# Patient Record
Sex: MALE | Race: WHITE | Employment: UNEMPLOYED | URBAN - METROPOLITAN AREA
[De-identification: names, ages, dates, MRNs, and addresses within clinical notes are randomized per-mention and may not be internally consistent; named-entity substitution may affect disease eponyms.]

---

## 2017-02-20 ENCOUNTER — TELEPHONE (OUTPATIENT)
Dept: FAMILY MEDICINE CLINIC | Age: 42
End: 2017-02-20

## 2017-02-22 ENCOUNTER — OFFICE VISIT (OUTPATIENT)
Dept: FAMILY MEDICINE CLINIC | Age: 42
End: 2017-02-22

## 2017-02-22 VITALS
TEMPERATURE: 98.3 F | HEIGHT: 75 IN | BODY MASS INDEX: 29.72 KG/M2 | SYSTOLIC BLOOD PRESSURE: 138 MMHG | HEART RATE: 104 BPM | DIASTOLIC BLOOD PRESSURE: 70 MMHG | RESPIRATION RATE: 19 BRPM | OXYGEN SATURATION: 97 % | WEIGHT: 239 LBS

## 2017-02-22 DIAGNOSIS — N52.8 OTHER MALE ERECTILE DYSFUNCTION: ICD-10-CM

## 2017-02-22 DIAGNOSIS — M54.40 CHRONIC LEFT-SIDED LOW BACK PAIN WITH SCIATICA, SCIATICA LATERALITY UNSPECIFIED: Primary | ICD-10-CM

## 2017-02-22 DIAGNOSIS — F31.31 BIPOLAR AFFECTIVE DISORDER, CURRENTLY DEPRESSED, MILD (HCC): ICD-10-CM

## 2017-02-22 DIAGNOSIS — G89.29 CHRONIC LEFT-SIDED LOW BACK PAIN WITH SCIATICA, SCIATICA LATERALITY UNSPECIFIED: Primary | ICD-10-CM

## 2017-02-22 PROCEDURE — 99213 OFFICE O/P EST LOW 20 MIN: CPT | Performed by: FAMILY MEDICINE

## 2017-02-22 ASSESSMENT — ENCOUNTER SYMPTOMS: BACK PAIN: 1

## 2017-03-19 ENCOUNTER — HOSPITAL ENCOUNTER (EMERGENCY)
Age: 42
Discharge: HOME | End: 2017-03-19
Attending: FAMILY MEDICINE
Payer: COMMERCIAL

## 2017-03-19 VITALS
WEIGHT: 235 LBS | RESPIRATION RATE: 16 BRPM | BODY MASS INDEX: 29.22 KG/M2 | OXYGEN SATURATION: 98 % | SYSTOLIC BLOOD PRESSURE: 152 MMHG | TEMPERATURE: 98.2 F | HEIGHT: 75 IN | HEART RATE: 82 BPM | DIASTOLIC BLOOD PRESSURE: 98 MMHG

## 2017-03-19 DIAGNOSIS — J01.01 ACUTE RECURRENT MAXILLARY SINUSITIS: Primary | ICD-10-CM

## 2017-03-19 PROCEDURE — 99283 EMERGENCY DEPT VISIT LOW MDM: CPT

## 2017-03-19 RX ORDER — AMOXICILLIN AND CLAVULANATE POTASSIUM 875; 125 MG/1; MG/1
1 TABLET, FILM COATED ORAL 2 TIMES DAILY
Qty: 20 TABLET | Refills: 0 | Status: SHIPPED | OUTPATIENT
Start: 2017-03-19 | End: 2017-03-29

## 2017-03-19 ASSESSMENT — PAIN SCALES - GENERAL
PAINLEVEL_OUTOF10: 7
PAINLEVEL_OUTOF10: 7

## 2017-03-19 ASSESSMENT — PAIN DESCRIPTION - DESCRIPTORS: DESCRIPTORS: ACHING

## 2017-03-19 ASSESSMENT — PAIN DESCRIPTION - PAIN TYPE
TYPE: ACUTE PAIN
TYPE: CHRONIC PAIN

## 2017-03-19 ASSESSMENT — PAIN DESCRIPTION - LOCATION: LOCATION: BACK

## 2017-03-20 ENCOUNTER — OFFICE VISIT (OUTPATIENT)
Dept: FAMILY MEDICINE CLINIC | Age: 42
End: 2017-03-20

## 2017-03-20 VITALS
BODY MASS INDEX: 29.12 KG/M2 | OXYGEN SATURATION: 98 % | WEIGHT: 234.2 LBS | RESPIRATION RATE: 20 BRPM | HEART RATE: 103 BPM | DIASTOLIC BLOOD PRESSURE: 72 MMHG | TEMPERATURE: 98.7 F | HEIGHT: 75 IN | SYSTOLIC BLOOD PRESSURE: 124 MMHG

## 2017-03-20 DIAGNOSIS — M54.40 CHRONIC LEFT-SIDED LOW BACK PAIN WITH SCIATICA, SCIATICA LATERALITY UNSPECIFIED: ICD-10-CM

## 2017-03-20 DIAGNOSIS — G89.29 CHRONIC LEFT-SIDED LOW BACK PAIN WITH SCIATICA, SCIATICA LATERALITY UNSPECIFIED: ICD-10-CM

## 2017-03-20 DIAGNOSIS — F31.31 BIPOLAR AFFECTIVE DISORDER, CURRENTLY DEPRESSED, MILD (HCC): Primary | ICD-10-CM

## 2017-03-20 PROCEDURE — 99213 OFFICE O/P EST LOW 20 MIN: CPT | Performed by: FAMILY MEDICINE

## 2017-03-20 RX ORDER — FLUTICASONE PROPIONATE 50 MCG
2 SPRAY, SUSPENSION (ML) NASAL DAILY
COMMUNITY
Start: 2017-03-11 | End: 2019-02-11 | Stop reason: SDUPTHER

## 2017-03-20 RX ORDER — AZELASTINE 1 MG/ML
SPRAY, METERED NASAL
COMMUNITY
Start: 2017-03-11 | End: 2017-09-25 | Stop reason: SDUPTHER

## 2017-03-20 ASSESSMENT — ENCOUNTER SYMPTOMS: BACK PAIN: 1

## 2017-03-22 ENCOUNTER — OFFICE VISIT (OUTPATIENT)
Dept: UROLOGY | Age: 42
End: 2017-03-22

## 2017-03-22 VITALS
HEIGHT: 75 IN | HEART RATE: 102 BPM | BODY MASS INDEX: 29.22 KG/M2 | DIASTOLIC BLOOD PRESSURE: 68 MMHG | SYSTOLIC BLOOD PRESSURE: 138 MMHG | WEIGHT: 235 LBS

## 2017-03-22 DIAGNOSIS — N52.9 ERECTILE DYSFUNCTION, UNSPECIFIED ERECTILE DYSFUNCTION TYPE: Primary | ICD-10-CM

## 2017-03-22 PROCEDURE — 99204 OFFICE O/P NEW MOD 45 MIN: CPT | Performed by: UROLOGY

## 2017-03-22 RX ORDER — TADALAFIL 20 MG/1
20 TABLET ORAL PRN
Qty: 3 TABLET | Refills: 0 | COMMUNITY
Start: 2017-03-22 | End: 2017-09-25

## 2017-03-22 ASSESSMENT — ENCOUNTER SYMPTOMS
RESPIRATORY NEGATIVE: 1
ALLERGIC/IMMUNOLOGIC NEGATIVE: 1
EYES NEGATIVE: 1
BACK PAIN: 1
GASTROINTESTINAL NEGATIVE: 1

## 2017-09-25 ENCOUNTER — OFFICE VISIT (OUTPATIENT)
Dept: FAMILY MEDICINE CLINIC | Age: 42
End: 2017-09-25

## 2017-09-25 ENCOUNTER — TELEPHONE (OUTPATIENT)
Dept: FAMILY MEDICINE CLINIC | Age: 42
End: 2017-09-25

## 2017-09-25 VITALS
OXYGEN SATURATION: 98 % | SYSTOLIC BLOOD PRESSURE: 130 MMHG | HEIGHT: 75 IN | BODY MASS INDEX: 30.09 KG/M2 | DIASTOLIC BLOOD PRESSURE: 80 MMHG | RESPIRATION RATE: 14 BRPM | TEMPERATURE: 96.3 F | HEART RATE: 89 BPM | WEIGHT: 242 LBS

## 2017-09-25 DIAGNOSIS — J40 BRONCHITIS: Primary | ICD-10-CM

## 2017-09-25 DIAGNOSIS — F19.11 SUBSTANCE ABUSE IN REMISSION (HCC): ICD-10-CM

## 2017-09-25 DIAGNOSIS — N52.9 ERECTILE DYSFUNCTION, UNSPECIFIED ERECTILE DYSFUNCTION TYPE: ICD-10-CM

## 2017-09-25 LAB
ALBUMIN SERPL-MCNC: 5 G/DL (ref 3.9–4.9)
ALP BLD-CCNC: 81 U/L (ref 35–104)
ALT SERPL-CCNC: 46 U/L (ref 0–41)
ANION GAP SERPL CALCULATED.3IONS-SCNC: 21 MEQ/L (ref 7–13)
AST SERPL-CCNC: 30 U/L (ref 0–40)
BILIRUB SERPL-MCNC: 0.3 MG/DL (ref 0–1.2)
BUN BLDV-MCNC: 10 MG/DL (ref 6–20)
CALCIUM SERPL-MCNC: 10.1 MG/DL (ref 8.6–10.2)
CHLORIDE BLD-SCNC: 99 MEQ/L (ref 98–107)
CO2: 25 MEQ/L (ref 22–29)
CREAT SERPL-MCNC: 0.83 MG/DL (ref 0.7–1.2)
GFR AFRICAN AMERICAN: >60
GFR NON-AFRICAN AMERICAN: >60
GLOBULIN: 2.4 G/DL (ref 2.3–3.5)
GLUCOSE BLD-MCNC: 108 MG/DL (ref 74–109)
HCT VFR BLD CALC: 46.5 % (ref 42–52)
HEMOGLOBIN: 15.4 G/DL (ref 14–18)
HEPATITIS C ANTIBODY INTERPRETATION: NORMAL
MCH RBC QN AUTO: 30.4 PG (ref 27–31.3)
MCHC RBC AUTO-ENTMCNC: 33 % (ref 33–37)
MCV RBC AUTO: 92 FL (ref 80–100)
PDW BLD-RTO: 13.1 % (ref 11.5–14.5)
PLATELET # BLD: 233 K/UL (ref 130–400)
POTASSIUM SERPL-SCNC: 4.4 MEQ/L (ref 3.5–5.1)
RBC # BLD: 5.06 M/UL (ref 4.7–6.1)
SODIUM BLD-SCNC: 145 MEQ/L (ref 132–144)
TOTAL PROTEIN: 7.4 G/DL (ref 6.4–8.1)
WBC # BLD: 15.5 K/UL (ref 4.8–10.8)

## 2017-09-25 PROCEDURE — 99213 OFFICE O/P EST LOW 20 MIN: CPT | Performed by: NURSE PRACTITIONER

## 2017-09-25 RX ORDER — TADALAFIL 5 MG/1
5 TABLET ORAL PRN
Qty: 30 TABLET | Refills: 3 | Status: SHIPPED | OUTPATIENT
Start: 2017-09-25 | End: 2017-10-20

## 2017-09-25 RX ORDER — METHYLPREDNISOLONE 4 MG/1
TABLET ORAL
Qty: 1 KIT | Refills: 0 | Status: SHIPPED | OUTPATIENT
Start: 2017-09-25 | End: 2017-10-01

## 2017-09-25 RX ORDER — AZITHROMYCIN 250 MG/1
TABLET, FILM COATED ORAL
Qty: 1 PACKET | Refills: 0 | Status: SHIPPED | OUTPATIENT
Start: 2017-09-25 | End: 2017-10-05

## 2017-09-25 RX ORDER — TADALAFIL 20 MG/1
10 TABLET ORAL PRN
Qty: 3 TABLET | Refills: 0 | Status: CANCELLED | OUTPATIENT
Start: 2017-09-25

## 2017-09-25 RX ORDER — AZELASTINE 1 MG/ML
2 SPRAY, METERED NASAL 2 TIMES DAILY
Qty: 1 BOTTLE | Refills: 5 | Status: SHIPPED | OUTPATIENT
Start: 2017-09-25 | End: 2018-09-26 | Stop reason: SDUPTHER

## 2017-09-25 ASSESSMENT — PATIENT HEALTH QUESTIONNAIRE - PHQ9
1. LITTLE INTEREST OR PLEASURE IN DOING THINGS: 0
SUM OF ALL RESPONSES TO PHQ9 QUESTIONS 1 & 2: 0
SUM OF ALL RESPONSES TO PHQ QUESTIONS 1-9: 0
2. FEELING DOWN, DEPRESSED OR HOPELESS: 0

## 2017-09-25 ASSESSMENT — ENCOUNTER SYMPTOMS: COUGH: 1

## 2017-09-27 LAB — HIV-1 AND HIV-2 ANTIBODIES: NEGATIVE

## 2017-10-16 ENCOUNTER — OFFICE VISIT (OUTPATIENT)
Dept: FAMILY MEDICINE CLINIC | Age: 42
End: 2017-10-16

## 2017-10-16 VITALS
DIASTOLIC BLOOD PRESSURE: 84 MMHG | WEIGHT: 242 LBS | OXYGEN SATURATION: 98 % | RESPIRATION RATE: 16 BRPM | HEIGHT: 75 IN | TEMPERATURE: 96.5 F | BODY MASS INDEX: 30.09 KG/M2 | SYSTOLIC BLOOD PRESSURE: 132 MMHG | HEART RATE: 88 BPM

## 2017-10-16 DIAGNOSIS — R19.5 LOOSE STOOLS: ICD-10-CM

## 2017-10-16 DIAGNOSIS — R10.30 LOWER ABDOMINAL PAIN: Primary | ICD-10-CM

## 2017-10-16 PROCEDURE — 99213 OFFICE O/P EST LOW 20 MIN: CPT | Performed by: NURSE PRACTITIONER

## 2017-10-16 RX ORDER — METRONIDAZOLE 500 MG/1
500 TABLET ORAL 3 TIMES DAILY
Qty: 30 TABLET | Refills: 0 | Status: SHIPPED | OUTPATIENT
Start: 2017-10-16 | End: 2017-10-26

## 2017-10-16 ASSESSMENT — ENCOUNTER SYMPTOMS
FLATUS: 0
ABDOMINAL DISTENTION: 0
BLOATING: 1
CONSTIPATION: 0
DIARRHEA: 1
RESPIRATORY NEGATIVE: 1
VOMITING: 0
ABDOMINAL PAIN: 1
RECTAL PAIN: 0
BLOOD IN STOOL: 0
COUGH: 0
NAUSEA: 0
ANAL BLEEDING: 0

## 2017-10-16 NOTE — PROGRESS NOTES
Packs/day: 1.00     Types: Cigarettes    Smokeless tobacco: Never Used    Alcohol use No    Drug use: No    Sexual activity: Yes     Other Topics Concern    Not on file     Social History Narrative    No narrative on file     No family history on file. No Known Allergies  Current Outpatient Prescriptions   Medication Sig Dispense Refill    metroNIDAZOLE (FLAGYL) 500 MG tablet Take 1 tablet by mouth 3 times daily for 10 days 30 tablet 0    azelastine (ASTELIN) 0.1 % nasal spray 2 sprays by Nasal route 2 times daily 1 Bottle 5    tadalafil (CIALIS) 5 MG tablet Take 1 tablet by mouth as needed for Erectile Dysfunction 30 tablet 3    fluticasone (FLONASE) 50 MCG/ACT nasal spray        No current facility-administered medications for this visit. PMH, Surgical Hx, Family Hx, and Social Hx reviewed and updated. Health Maintenance reviewed. Objective    Vitals:    10/16/17 0932 10/16/17 0936   BP: (!) 144/94 132/84   Pulse: 88    Resp: 16    Temp: 96.5 °F (35.8 °C)    TempSrc: Tympanic    SpO2: 98%    Weight: 242 lb (109.8 kg)    Height: 6' 3\" (1.905 m)        Physical Exam   Constitutional: He is oriented to person, place, and time. He appears well-developed and well-nourished. No distress. HENT:   Head: Normocephalic and atraumatic. Right Ear: Tympanic membrane and external ear normal.   Left Ear: Tympanic membrane and external ear normal.   Nose: Nose normal.   Mouth/Throat: Uvula is midline, oropharynx is clear and moist and mucous membranes are normal. Mucous membranes are not dry. Eyes: Conjunctivae are normal. Pupils are equal, round, and reactive to light. Neck: Neck supple. No JVD present. Cardiovascular: Normal rate, regular rhythm, normal heart sounds and intact distal pulses. No murmur heard. Pulmonary/Chest: Effort normal and breath sounds normal. No respiratory distress. He has no wheezes. He has no rales. Abdominal: Soft.  Bowel sounds are normal. He exhibits no distension and no mass. There is tenderness. There is guarding. There is no rebound. Neurological: He is alert and oriented to person, place, and time. Skin: Skin is warm and dry. Assessment & Plan   Key Madden was seen today for follow-up. Diagnoses and all orders for this visit:    Lower abdominal pain  -     metroNIDAZOLE (FLAGYL) 500 MG tablet; Take 1 tablet by mouth 3 times daily for 10 days  -     Culture Stool; Future  -     C Diff Toxin B By Rt PCR; Future    Loose stools  -     metroNIDAZOLE (FLAGYL) 500 MG tablet; Take 1 tablet by mouth 3 times daily for 10 days  -     Culture Stool; Future  -     C Diff Toxin B By Rt PCR; Future      Orders Placed This Encounter   Procedures    Culture Stool     Standing Status:   Future     Standing Expiration Date:   10/16/2018    C Diff Toxin B By Rt PCR     Standing Status:   Future     Standing Expiration Date:   10/16/2018     Orders Placed This Encounter   Medications    metroNIDAZOLE (FLAGYL) 500 MG tablet     Sig: Take 1 tablet by mouth 3 times daily for 10 days     Dispense:  30 tablet     Refill:  0     There are no discontinued medications. Return in about 4 weeks (around 11/13/2017). Reviewed with the patient: current clinical status, medications, activities and diet. Side effects, adverse effects of the medication prescribed today, as well as treatment plan/ rationale and result expectations have been discussed with the patient who expresses understanding and desires to proceed. Close follow up to evaluate treatment results and for coordination of care. I have reviewed the patient's medical history in detail and updated the computerized patient record.     Cary Chiang NP

## 2017-10-20 ENCOUNTER — TELEPHONE (OUTPATIENT)
Dept: FAMILY MEDICINE CLINIC | Age: 42
End: 2017-10-20

## 2017-10-20 ENCOUNTER — APPOINTMENT (OUTPATIENT)
Dept: GENERAL RADIOLOGY | Age: 42
End: 2017-10-20
Payer: COMMERCIAL

## 2017-10-20 ENCOUNTER — HOSPITAL ENCOUNTER (EMERGENCY)
Age: 42
Discharge: HOME | End: 2017-10-21
Payer: COMMERCIAL

## 2017-10-20 DIAGNOSIS — R10.84 GENERALIZED ABDOMINAL PAIN: Primary | ICD-10-CM

## 2017-10-20 DIAGNOSIS — J30.2 SEASONAL ALLERGIC RHINITIS, UNSPECIFIED CHRONICITY, UNSPECIFIED TRIGGER: ICD-10-CM

## 2017-10-20 LAB
ALBUMIN SERPL-MCNC: 4.2 G/DL (ref 3.9–4.9)
ALP BLD-CCNC: 78 U/L (ref 35–104)
ALT SERPL-CCNC: 76 U/L (ref 0–41)
ANION GAP SERPL CALCULATED.3IONS-SCNC: 14 MEQ/L (ref 7–13)
AST SERPL-CCNC: 56 U/L (ref 0–40)
BILIRUB SERPL-MCNC: 0.2 MG/DL (ref 0–1.2)
BUN BLDV-MCNC: 11 MG/DL (ref 6–20)
CALCIUM SERPL-MCNC: 9.9 MG/DL (ref 8.6–10.2)
CHLORIDE BLD-SCNC: 104 MEQ/L (ref 98–107)
CO2: 25 MEQ/L (ref 22–29)
CREAT SERPL-MCNC: 0.94 MG/DL (ref 0.7–1.2)
EKG ATRIAL RATE: 84 BPM
EKG P AXIS: 29 DEGREES
EKG P-R INTERVAL: 150 MS
EKG Q-T INTERVAL: 356 MS
EKG QRS DURATION: 104 MS
EKG QTC CALCULATION (BAZETT): 420 MS
EKG R AXIS: 54 DEGREES
EKG T AXIS: 22 DEGREES
EKG VENTRICULAR RATE: 84 BPM
GFR AFRICAN AMERICAN: >60
GFR NON-AFRICAN AMERICAN: >60
GLOBULIN: 2.2 G/DL (ref 2.3–3.5)
GLUCOSE BLD-MCNC: 122 MG/DL (ref 74–109)
HCT VFR BLD CALC: 41.8 % (ref 42–52)
HEMOGLOBIN: 14.3 G/DL (ref 14–18)
MCH RBC QN AUTO: 30.8 PG (ref 27–31.3)
MCHC RBC AUTO-ENTMCNC: 34.3 % (ref 33–37)
MCV RBC AUTO: 89.8 FL (ref 80–100)
PDW BLD-RTO: 13.3 % (ref 11.5–14.5)
PLATELET # BLD: 169 K/UL (ref 130–400)
POTASSIUM SERPL-SCNC: 3.9 MEQ/L (ref 3.5–5.1)
RAPID INFLUENZA  B AGN: NEGATIVE
RAPID INFLUENZA A AGN: NEGATIVE
RBC # BLD: 4.66 M/UL (ref 4.7–6.1)
SODIUM BLD-SCNC: 143 MEQ/L (ref 132–144)
TOTAL PROTEIN: 6.4 G/DL (ref 6.4–8.1)
TROPONIN: <0.01 NG/ML (ref 0–0.01)
WBC # BLD: 4.7 K/UL (ref 4.8–10.8)

## 2017-10-20 PROCEDURE — 84484 ASSAY OF TROPONIN QUANT: CPT

## 2017-10-20 PROCEDURE — 71020 XR CHEST STANDARD TWO VW: CPT

## 2017-10-20 PROCEDURE — 93005 ELECTROCARDIOGRAM TRACING: CPT

## 2017-10-20 PROCEDURE — 86403 PARTICLE AGGLUT ANTBDY SCRN: CPT

## 2017-10-20 PROCEDURE — 85027 COMPLETE CBC AUTOMATED: CPT

## 2017-10-20 PROCEDURE — 99284 EMERGENCY DEPT VISIT MOD MDM: CPT

## 2017-10-20 PROCEDURE — 36415 COLL VENOUS BLD VENIPUNCTURE: CPT

## 2017-10-20 PROCEDURE — 80053 COMPREHEN METABOLIC PANEL: CPT

## 2017-10-20 RX ORDER — BENZONATATE 100 MG/1
100 CAPSULE ORAL ONCE
Status: COMPLETED | OUTPATIENT
Start: 2017-10-20 | End: 2017-10-21

## 2017-10-20 RX ORDER — LORATADINE 10 MG/1
10 CAPSULE, LIQUID FILLED ORAL DAILY
Qty: 30 CAPSULE | Refills: 0 | Status: SHIPPED | OUTPATIENT
Start: 2017-10-20 | End: 2017-11-21 | Stop reason: SDUPTHER

## 2017-10-20 ASSESSMENT — PAIN DESCRIPTION - FREQUENCY: FREQUENCY: CONTINUOUS

## 2017-10-20 ASSESSMENT — PAIN SCALES - GENERAL: PAINLEVEL_OUTOF10: 8

## 2017-10-20 ASSESSMENT — PAIN DESCRIPTION - LOCATION: LOCATION: ABDOMEN;FACE

## 2017-10-20 ASSESSMENT — PAIN DESCRIPTION - DESCRIPTORS: DESCRIPTORS: ACHING

## 2017-10-21 VITALS
SYSTOLIC BLOOD PRESSURE: 124 MMHG | WEIGHT: 240 LBS | DIASTOLIC BLOOD PRESSURE: 68 MMHG | TEMPERATURE: 98.6 F | HEIGHT: 75 IN | BODY MASS INDEX: 29.84 KG/M2 | HEART RATE: 86 BPM | RESPIRATION RATE: 17 BRPM | OXYGEN SATURATION: 99 %

## 2017-10-21 PROCEDURE — 6370000000 HC RX 637 (ALT 250 FOR IP): Performed by: NURSE PRACTITIONER

## 2017-10-21 RX ORDER — OMEPRAZOLE 20 MG/1
20 CAPSULE, DELAYED RELEASE ORAL DAILY
Qty: 30 CAPSULE | Refills: 0 | Status: SHIPPED | OUTPATIENT
Start: 2017-10-21 | End: 2018-02-07 | Stop reason: ALTCHOICE

## 2017-10-21 RX ADMIN — BENZONATATE 100 MG: 100 CAPSULE ORAL at 00:04

## 2017-10-21 NOTE — ED PROVIDER NOTES
[facility] ED  eMERGENCY dEPARTMENT eNCOUnter      Pt Name: Baylee Flores  MRN: [id number]  [place] 1975  Date of evaluation: 10/20/2017  Provider: Todd Newby NP     [address] [place] [address]       Chief Complaint   Patient presents with    Abdominal Pain     x a few weeks, diarrhea, chest burning and facial congestion       HISTORY OF PRESENT ILLNESS   (Location/Symptom, Timing/Onset, Context/Setting, Quality, Duration, Modifying Factors, Severity) Note limiting factors. This is a 39 yo male patient who presents to the ER with complaints of abdominal pain and diarrhea for a couple of weeks. Pt also complains of upper respiratory symptoms with non-productive cough, nasal congestion and runny nose. Pt admits to smoking one pack of cigarettes per day. Pt states that his wife was recently admitted to the hospital for pneumonia and bronchitis and he spent a lot of time at her bedside. Pt then was at the bedside with his mother who was also hospitalized for c-diff. Pt was seen by Katrin Walsh who prescribed him flaggyl prophylactically for c-diff and he has been taking the prescription for four days. Pt states that he feels like he has icy hot in his chest as he has a cold sensation from coughing and congestion. Pt denies any shortness of breath or exertional dyspnea. Pt denies any fever, chills, nausea, vomiting, but states that he is still having diarrhea. Pt also denies any recent travel and has been in contact with sick contacts. Pt denies any headache, blurred or double vision.           Abdominal Pain   Pain location:  Generalized  Pain quality: aching and cramping    Pain radiates to:  Does not radiate  Pain severity:  Moderate  Onset quality:  Gradual  Duration:  14 days  Timing:  Intermittent  Progression:  Unchanged  Chronicity:  New  Context: sick contacts    Context: not alcohol use, not awakening from sleep, not diet changes, not eating, not laxative use, not medication withdrawal, not previous surgeries, not recent illness, not recent sexual activity, not recent travel, not retching, not suspicious food intake and not trauma    Relieved by:  None tried  Worsened by:  Nothing  Ineffective treatments:  None tried (Over-the-counter medications have been tried for his cold symptoms however he has been taking Flagyl for 4 days.)  Associated symptoms: cough and diarrhea    Associated symptoms: no anorexia, no belching, no chest pain, no chills, no constipation, no dysuria, no fatigue, no fever, no flatus, no hematuria, no melena, no nausea, no shortness of breath, no sore throat and no vomiting    Risk factors: obesity    Risk factors: no alcohol abuse and no recent hospitalization        Nursing Notes were reviewed. REVIEW OF SYSTEMS    (2+ for level 4; 10+ for level 5)     Review of Systems   Constitutional: Negative for chills, fatigue and fever. HENT: Positive for congestion, rhinorrhea, sinus pain and sinus pressure. Negative for ear discharge, ear pain, facial swelling, sneezing, sore throat and trouble swallowing. Respiratory: Positive for cough. Negative for shortness of breath. Cardiovascular: Negative for chest pain. Gastrointestinal: Positive for abdominal pain and diarrhea. Negative for anorexia, constipation, flatus, melena, nausea and vomiting. Genitourinary: Negative for dysuria and hematuria. Musculoskeletal: Negative for arthralgias and back pain. Skin: Negative for color change. Neurological: Negative for dizziness, seizures, weakness, light-headedness, numbness and headaches. Psychiatric/Behavioral: Negative for agitation and behavioral problems. Except as noted above the remainder of the review of systems was reviewed and negative.      PAST MEDICAL HISTORY     Past Medical History:   Diagnosis Date    Anxiety     Chronic back pain     Drug abuse     pt states he has been clean since March       SURGICAL HISTORY       Past Surgical History:   Procedure Laterality Date    VASECTOMY         CURRENT MEDICATIONS       Discharge Medication List as of 10/20/2017 11:47 PM      CONTINUE these medications which have NOT CHANGED    Details   metroNIDAZOLE (FLAGYL) 500 MG tablet Take 1 tablet by mouth 3 times daily for 10 days, Disp-30 tablet, R-0Normal      azelastine (ASTELIN) 0.1 % nasal spray 2 sprays by Nasal route 2 times daily, Disp-1 Bottle, R-5Normal      fluticasone (FLONASE) 50 MCG/ACT nasal spray Historical Med             ALLERGIES     Review of patient's allergies indicates no known allergies. FAMILY HISTORY     History reviewed. No pertinent family history. SOCIAL HISTORY       Social History     Social History    Marital status:      Spouse name: N/A    Number of children: N/A    Years of education: N/A     Social History Main Topics    Smoking status: Current Every Day Smoker     Packs/day: 1.00     Types: Cigarettes    Smokeless tobacco: Never Used    Alcohol use No    Drug use: No      Comment: pt states he has been clean since March 2017    Sexual activity: Yes     Other Topics Concern    None     Social History Narrative    None       SCREENINGS           PHYSICAL EXAM    (up to 7 for level 4, 8 or more for level 5)     ED Triage Vitals [10/20/17 2137]   BP Temp Temp Source Pulse Resp SpO2 Height Weight   130/78 98.6 °F (37 °C) Temporal 96 18 98 % 6' 3\" (1.905 m) 240 lb (108.9 kg)       Physical Exam   Constitutional: He is oriented to person, place, and time. He appears well-developed and well-nourished. No distress. HENT:   Head: Normocephalic and atraumatic. Right Ear: External ear normal.   Left Ear: External ear normal.   Mouth/Throat: Oropharynx is clear and moist.   She has clear nasal drainage and postnasal drip. Eyes: Conjunctivae are normal.   Neck: Normal range of motion. Neck supple. Cardiovascular: Normal rate and regular rhythm.     Pulmonary/Chest: Effort normal and breath sounds normal.   Abdominal: (1.905 m)         MDM  Number of Diagnoses or Management Options  Generalized abdominal pain:   Seasonal allergic rhinitis, unspecified chronicity, unspecified trigger:   Diagnosis management comments:  She presents to the emergency room with complaints of abdominal pain and diarrhea for 2 weeks. And upper respiratory and tract infections. Patient has had sick contacts with his wife who had pneumonia and his mother who had C. Diff. Patient was seen in primary care and given a prescription for Flagyl to treat for C. Diff prophylactically. Patient has been taking this medication for 4 days and presents to the emergency room still complaining of abdominal pain and diarrhea. Patient was unable to give a stool specimen while in the emergency room to have it examined for the possibility of C. Diff. Patient also complains of his chest feeling like he has icy hot on it and due to his symptoms a cardiac workup was ordered which essentially is unremarkable. Patient's troponin is less than 0.010, and chest x-ray and EKG are both unremarkable as well. Patient's symptoms and results of his lab workup are consistent with allergic rhinitis. Since her score is 0 and he does not meet PERC criteria for pulmonary embolism. There is no leukocytosis present patient will be discharged home with loratadine, Flonase, Tessalon Perles and instructed to follow-up in primary care in 72 hours if not feeling better. Patient instructed to return to the emergency room for increasing shortness of breath, severe chest pain, or new and concerning symptoms. Eyes his understanding and has no further questions, concerns or concerns at this time. And verbalizes understanding and has no further questions, concerns or concerns at this time.        Amount and/or Complexity of Data Reviewed  Clinical lab tests: ordered and reviewed  Tests in the radiology section of CPT®: ordered and reviewed    Patient Progress  Patient progress: stable            CRITICAL CARE TIME     Total Critical Care time (not applicable if blank)      Total minutes, excluding separately reportable procedures. There was a high probability of clinically significant/life threatening deterioration in the patient's condition which required my urgent intervention. This includes discussing the case with consultants, reviewing laboratory studies and images independently, arranging disposition, and speaking with patient/family    CONSULTS:  None    PROCEDURES:  Unless otherwise noted below, none     Procedures    FINAL IMPRESSION      1. Generalized abdominal pain    2.  Seasonal allergic rhinitis, unspecified chronicity, unspecified trigger          DISPOSITION/PLAN   DISPOSITION Decision to Discharge    PATIENT REFERRED TO:  Mily Parish MD  [facility] [address]  [phone]    In 3 days  If symptoms worsen      DISCHARGE MEDICATIONS:  Discharge Medication List as of 10/20/2017 11:47 PM      START taking these medications    Details   loratadine (CLARITIN) 10 MG capsule Take 1 capsule by mouth daily, Disp-30 capsule, R-0Print                (Please note that portions of this note were completed with a voice recognition program.  Efforts were made to edit the dictations but occasionally words and phrases are mis-transcribed.)    Shaquille Vargas NP  (electronically signed)                 Shaquille Vargas NP  10/20/17 3816       Jossue Lang NP  10/20/17 4699 [address] [place] [address], [place]  10/25/17 8520

## 2017-10-23 RX ORDER — DEXTROMETHORPHAN HYDROBROMIDE AND PROMETHAZINE HYDROCHLORIDE 15; 6.25 MG/5ML; MG/5ML
5 SYRUP ORAL EVERY 6 HOURS PRN
Qty: 150 ML | Refills: 0 | Status: SHIPPED | OUTPATIENT
Start: 2017-10-23 | End: 2018-02-07 | Stop reason: ALTCHOICE

## 2017-10-25 ASSESSMENT — ENCOUNTER SYMPTOMS
TROUBLE SWALLOWING: 0
DIARRHEA: 1
VOMITING: 0
SORE THROAT: 0
COLOR CHANGE: 0
RHINORRHEA: 1
BELCHING: 0
SINUS PAIN: 1
SINUS PRESSURE: 1
BACK PAIN: 0
NAUSEA: 0
FLATUS: 0
COUGH: 1
SHORTNESS OF BREATH: 0
FACIAL SWELLING: 0
CONSTIPATION: 0
ABDOMINAL PAIN: 1

## 2017-10-27 PROCEDURE — 93010 ELECTROCARDIOGRAM REPORT: CPT | Performed by: INTERNAL MEDICINE

## 2017-11-21 RX ORDER — LORATADINE 10 MG/1
10 CAPSULE, LIQUID FILLED ORAL DAILY
Qty: 30 CAPSULE | Refills: 0 | Status: SHIPPED | OUTPATIENT
Start: 2017-11-21 | End: 2017-12-06 | Stop reason: CLARIF

## 2017-12-06 ENCOUNTER — TELEPHONE (OUTPATIENT)
Dept: FAMILY MEDICINE CLINIC | Age: 42
End: 2017-12-06

## 2017-12-06 RX ORDER — LORATADINE 10 MG/1
10 TABLET ORAL DAILY
Qty: 30 TABLET | Refills: 0 | Status: SHIPPED | OUTPATIENT
Start: 2017-12-06 | End: 2019-01-25

## 2018-02-07 ENCOUNTER — OFFICE VISIT (OUTPATIENT)
Dept: FAMILY MEDICINE CLINIC | Age: 43
End: 2018-02-07
Payer: COMMERCIAL

## 2018-02-07 VITALS
WEIGHT: 239 LBS | DIASTOLIC BLOOD PRESSURE: 78 MMHG | TEMPERATURE: 98 F | HEART RATE: 78 BPM | SYSTOLIC BLOOD PRESSURE: 132 MMHG | BODY MASS INDEX: 29.72 KG/M2 | HEIGHT: 75 IN | RESPIRATION RATE: 16 BRPM

## 2018-02-07 DIAGNOSIS — R53.83 FATIGUE, UNSPECIFIED TYPE: ICD-10-CM

## 2018-02-07 DIAGNOSIS — F31.31 BIPOLAR AFFECTIVE DISORDER, CURRENTLY DEPRESSED, MILD (HCC): ICD-10-CM

## 2018-02-07 DIAGNOSIS — Z72.0 TOBACCO ABUSE: ICD-10-CM

## 2018-02-07 DIAGNOSIS — Z00.00 HEALTH MAINTENANCE EXAMINATION: Primary | ICD-10-CM

## 2018-02-07 PROCEDURE — 99396 PREV VISIT EST AGE 40-64: CPT | Performed by: FAMILY MEDICINE

## 2018-02-07 ASSESSMENT — ENCOUNTER SYMPTOMS
ABDOMINAL PAIN: 0
SORE THROAT: 0

## 2018-02-07 NOTE — PATIENT INSTRUCTIONS
Thank you for enrolling in [address] Please follow the instructions below to securely access your online medical record. [employer] allows you to send messages to your doctor, view your test results, renew your prescriptions, schedule appointments, and more. How Do I Sign Up? 1. In your Internet browser, go to [email or web address]  2. Click on the Sign Up Now link in the Sign In box. You will see the New Member Sign Up page. 3. Enter your [employer] Access Code exactly as it appears below. You will not need to use this code after youve completed the sign-up process. If you do not sign up before the expiration date, you must request a new code. [employer] Access Code: [email or web address]  Expires: 4/8/2018  9:15 AM    4. Enter your Social Security Number ([id number]) and Date of Birth (mm/dd/yyyy) as indicated and click Submit. You will be taken to the next sign-up page. 5. Create a [employer] ID. This will be your [employer] login ID and cannot be changed, so think of one that is secure and easy to remember. 6. Create a [employer] password. You can change your password at any time. 7. Enter your Password Reset Question and Answer. This can be used at a later time if you forget your password. 8. Enter your e-mail address. You will receive e-mail notification when new information is available in [address] 9. Click Sign Up. You can now view your medical record. Additional Information  If you have questions, please contact your physician practice where you receive care. Remember, [employer] is NOT to be used for urgent needs. For medical emergencies, dial 911.

## 2018-03-26 ENCOUNTER — OFFICE VISIT (OUTPATIENT)
Dept: UROLOGY | Age: 43
End: 2018-03-26
Payer: COMMERCIAL

## 2018-03-26 VITALS
SYSTOLIC BLOOD PRESSURE: 136 MMHG | BODY MASS INDEX: 27.98 KG/M2 | HEART RATE: 93 BPM | WEIGHT: 225 LBS | HEIGHT: 75 IN | DIASTOLIC BLOOD PRESSURE: 80 MMHG

## 2018-03-26 DIAGNOSIS — N52.9 ERECTILE DYSFUNCTION, UNSPECIFIED ERECTILE DYSFUNCTION TYPE: Primary | ICD-10-CM

## 2018-03-26 PROCEDURE — G8427 DOCREV CUR MEDS BY ELIG CLIN: HCPCS | Performed by: UROLOGY

## 2018-03-26 PROCEDURE — G8484 FLU IMMUNIZE NO ADMIN: HCPCS | Performed by: UROLOGY

## 2018-03-26 PROCEDURE — G8417 CALC BMI ABV UP PARAM F/U: HCPCS | Performed by: UROLOGY

## 2018-03-26 PROCEDURE — 4004F PT TOBACCO SCREEN RCVD TLK: CPT | Performed by: UROLOGY

## 2018-03-26 PROCEDURE — 99212 OFFICE O/P EST SF 10 MIN: CPT | Performed by: UROLOGY

## 2018-03-26 RX ORDER — TADALAFIL 5 MG/1
5 TABLET ORAL DAILY
Qty: 30 TABLET | Refills: 0 | COMMUNITY
Start: 2018-03-26 | End: 2019-01-25

## 2018-03-26 ASSESSMENT — ENCOUNTER SYMPTOMS: SHORTNESS OF BREATH: 0

## 2018-03-26 NOTE — PROGRESS NOTES
Subjective:      Patient ID: Bobbi Aleman is a 37 y.o. male. HPI  This is a 38 yo male with Anxiety, DDD/OA/pain management, h/o heroin abuse with h/o neurogenic ED back in follow-up. When last seen on 3/22/17, he was given a Cialis trial at 20 mg and felt this worked well. He benton snot use this at all times. He has no SE reported. He has no new medical or surgical problems. He has no CAD or CP. He takes no nitrates. Past Medical History:   Diagnosis Date    Anxiety     Chronic back pain     Drug abuse     pt states he has been clean since March     Past Surgical History:   Procedure Laterality Date    VASECTOMY       Social History     Social History    Marital status:      Spouse name: N/A    Number of children: N/A    Years of education: N/A     Social History Main Topics    Smoking status: Current Every Day Smoker     Packs/day: 1.00     Types: Cigarettes    Smokeless tobacco: Never Used    Alcohol use No    Drug use: No      Comment: pt states he has been clean since March 2017    Sexual activity: Yes     Other Topics Concern    None     Social History Narrative    None     History reviewed. No pertinent family history. Current Outpatient Prescriptions   Medication Sig Dispense Refill    loratadine (CLARITIN) 10 MG tablet Take 1 tablet by mouth daily 30 tablet 0    azelastine (ASTELIN) 0.1 % nasal spray 2 sprays by Nasal route 2 times daily 1 Bottle 5    fluticasone (FLONASE) 50 MCG/ACT nasal spray        No current facility-administered medications for this visit. Patient has no known allergies. reviewed      Review of Systems   Constitutional: Negative for unexpected weight change. Respiratory: Negative for shortness of breath. Cardiovascular: Negative for chest pain. Genitourinary: Negative for difficulty urinating, dysuria, enuresis, flank pain and hematuria. Objective:   Physical Exam   Constitutional: He appears well-developed and well-nourished.

## 2018-04-02 ENCOUNTER — OFFICE VISIT (OUTPATIENT)
Dept: FAMILY MEDICINE CLINIC | Age: 43
End: 2018-04-02
Payer: COMMERCIAL

## 2018-04-02 VITALS
RESPIRATION RATE: 30 BRPM | DIASTOLIC BLOOD PRESSURE: 70 MMHG | TEMPERATURE: 97 F | HEIGHT: 75 IN | BODY MASS INDEX: 29.94 KG/M2 | SYSTOLIC BLOOD PRESSURE: 130 MMHG | WEIGHT: 240.8 LBS | HEART RATE: 80 BPM

## 2018-04-02 DIAGNOSIS — Z20.2 STD EXPOSURE: ICD-10-CM

## 2018-04-02 DIAGNOSIS — R21 PENILE RASH: Primary | ICD-10-CM

## 2018-04-02 PROCEDURE — G8417 CALC BMI ABV UP PARAM F/U: HCPCS | Performed by: FAMILY MEDICINE

## 2018-04-02 PROCEDURE — G8427 DOCREV CUR MEDS BY ELIG CLIN: HCPCS | Performed by: FAMILY MEDICINE

## 2018-04-02 PROCEDURE — 4004F PT TOBACCO SCREEN RCVD TLK: CPT | Performed by: FAMILY MEDICINE

## 2018-04-02 PROCEDURE — 99213 OFFICE O/P EST LOW 20 MIN: CPT | Performed by: FAMILY MEDICINE

## 2018-04-02 ASSESSMENT — ENCOUNTER SYMPTOMS: COLOR CHANGE: 1

## 2018-04-23 DIAGNOSIS — Z00.00 HEALTH MAINTENANCE EXAMINATION: ICD-10-CM

## 2018-04-23 DIAGNOSIS — Z20.2 STD EXPOSURE: ICD-10-CM

## 2018-04-23 DIAGNOSIS — R53.83 FATIGUE, UNSPECIFIED TYPE: ICD-10-CM

## 2018-04-23 DIAGNOSIS — R21 PENILE RASH: ICD-10-CM

## 2018-04-23 LAB
BASOPHILS ABSOLUTE: 0.1 K/UL (ref 0–0.2)
BASOPHILS RELATIVE PERCENT: 0.8 %
EOSINOPHILS ABSOLUTE: 0.2 K/UL (ref 0–0.7)
EOSINOPHILS RELATIVE PERCENT: 2.4 %
FOLATE: 7.1 NG/ML (ref 7.3–26.1)
HCT VFR BLD CALC: 48.4 % (ref 42–52)
HEMOGLOBIN: 16.3 G/DL (ref 14–18)
LYMPHOCYTES ABSOLUTE: 1.8 K/UL (ref 1–4.8)
LYMPHOCYTES RELATIVE PERCENT: 24.6 %
MCH RBC QN AUTO: 31.5 PG (ref 27–31.3)
MCHC RBC AUTO-ENTMCNC: 33.7 % (ref 33–37)
MCV RBC AUTO: 93.3 FL (ref 80–100)
MONOCYTES ABSOLUTE: 0.3 K/UL (ref 0.2–0.8)
MONOCYTES RELATIVE PERCENT: 3.6 %
NEUTROPHILS ABSOLUTE: 4.9 K/UL (ref 1.4–6.5)
NEUTROPHILS RELATIVE PERCENT: 68.6 %
PDW BLD-RTO: 13.4 % (ref 11.5–14.5)
PLATELET # BLD: 229 K/UL (ref 130–400)
RBC # BLD: 5.19 M/UL (ref 4.7–6.1)
TSH SERPL DL<=0.05 MIU/L-ACNC: 1.54 UIU/ML (ref 0.27–4.2)
VITAMIN B-12: 467 PG/ML (ref 232–1245)
WBC # BLD: 7.2 K/UL (ref 4.8–10.8)

## 2018-04-24 LAB
ALBUMIN SERPL-MCNC: 4.6 G/DL (ref 3.9–4.9)
ALP BLD-CCNC: 79 U/L (ref 35–104)
ALT SERPL-CCNC: 24 U/L (ref 0–41)
ANION GAP SERPL CALCULATED.3IONS-SCNC: 16 MEQ/L (ref 7–13)
AST SERPL-CCNC: 20 U/L (ref 0–40)
BILIRUB SERPL-MCNC: 0.3 MG/DL (ref 0–1.2)
BUN BLDV-MCNC: 10 MG/DL (ref 6–20)
CALCIUM SERPL-MCNC: 9.7 MG/DL (ref 8.6–10.2)
CHLORIDE BLD-SCNC: 105 MEQ/L (ref 98–107)
CHOLESTEROL, TOTAL: 202 MG/DL (ref 0–199)
CO2: 23 MEQ/L (ref 22–29)
CREAT SERPL-MCNC: 0.91 MG/DL (ref 0.7–1.2)
GFR AFRICAN AMERICAN: >60
GFR NON-AFRICAN AMERICAN: >60
GLOBULIN: 2.1 G/DL (ref 2.3–3.5)
GLUCOSE BLD-MCNC: 87 MG/DL (ref 74–109)
HDLC SERPL-MCNC: 56 MG/DL (ref 40–59)
LDL CHOLESTEROL CALCULATED: 114 MG/DL (ref 0–129)
POTASSIUM SERPL-SCNC: 4.8 MEQ/L (ref 3.5–5.1)
SODIUM BLD-SCNC: 144 MEQ/L (ref 132–144)
TOTAL PROTEIN: 6.7 G/DL (ref 6.4–8.1)
TRIGL SERPL-MCNC: 161 MG/DL (ref 0–200)

## 2018-04-25 LAB
HIV-1 AND HIV-2 ANTIBODIES: NEGATIVE
TESTOSTERONE TOTAL-MALE: 588 NG/DL (ref 300–890)

## 2018-04-26 LAB
HERPES TYPE 1/2 IGM COMBINED: 1.01 IV
HERPES TYPE I/II IGG COMBINED: 19.2 IV

## 2018-04-30 ENCOUNTER — OFFICE VISIT (OUTPATIENT)
Dept: FAMILY MEDICINE CLINIC | Age: 43
End: 2018-04-30
Payer: COMMERCIAL

## 2018-04-30 VITALS
WEIGHT: 241.5 LBS | BODY MASS INDEX: 30.03 KG/M2 | SYSTOLIC BLOOD PRESSURE: 132 MMHG | DIASTOLIC BLOOD PRESSURE: 88 MMHG | HEART RATE: 98 BPM | TEMPERATURE: 98.6 F | OXYGEN SATURATION: 98 % | RESPIRATION RATE: 16 BRPM | HEIGHT: 75 IN

## 2018-04-30 DIAGNOSIS — E78.5 HYPERLIPIDEMIA, UNSPECIFIED HYPERLIPIDEMIA TYPE: ICD-10-CM

## 2018-04-30 DIAGNOSIS — E53.8 FOLATE DEFICIENCY: Primary | ICD-10-CM

## 2018-04-30 DIAGNOSIS — Z20.2 POSSIBLE EXPOSURE TO STD: ICD-10-CM

## 2018-04-30 PROCEDURE — 99212 OFFICE O/P EST SF 10 MIN: CPT | Performed by: FAMILY MEDICINE

## 2018-04-30 PROCEDURE — G8427 DOCREV CUR MEDS BY ELIG CLIN: HCPCS | Performed by: FAMILY MEDICINE

## 2018-04-30 PROCEDURE — G8417 CALC BMI ABV UP PARAM F/U: HCPCS | Performed by: FAMILY MEDICINE

## 2018-04-30 PROCEDURE — 4004F PT TOBACCO SCREEN RCVD TLK: CPT | Performed by: FAMILY MEDICINE

## 2018-04-30 RX ORDER — TERBINAFINE HYDROCHLORIDE 250 MG/1
TABLET ORAL
COMMUNITY
Start: 2018-04-10 | End: 2019-01-25

## 2018-04-30 RX ORDER — FOLIC ACID 1 MG/1
1 TABLET ORAL DAILY
Qty: 30 TABLET | Refills: 3 | Status: SHIPPED | OUTPATIENT
Start: 2018-04-30 | End: 2019-03-29

## 2018-04-30 ASSESSMENT — ENCOUNTER SYMPTOMS
SHORTNESS OF BREATH: 0
ABDOMINAL PAIN: 0

## 2018-05-07 ENCOUNTER — OFFICE VISIT (OUTPATIENT)
Dept: UROLOGY | Age: 43
End: 2018-05-07
Payer: COMMERCIAL

## 2018-05-07 VITALS
HEIGHT: 75 IN | SYSTOLIC BLOOD PRESSURE: 116 MMHG | WEIGHT: 240 LBS | DIASTOLIC BLOOD PRESSURE: 80 MMHG | HEART RATE: 79 BPM | BODY MASS INDEX: 29.84 KG/M2

## 2018-05-07 DIAGNOSIS — N52.9 ERECTILE DYSFUNCTION, UNSPECIFIED ERECTILE DYSFUNCTION TYPE: Primary | ICD-10-CM

## 2018-05-07 PROCEDURE — 4004F PT TOBACCO SCREEN RCVD TLK: CPT | Performed by: UROLOGY

## 2018-05-07 PROCEDURE — G8417 CALC BMI ABV UP PARAM F/U: HCPCS | Performed by: UROLOGY

## 2018-05-07 PROCEDURE — 99212 OFFICE O/P EST SF 10 MIN: CPT | Performed by: UROLOGY

## 2018-05-07 PROCEDURE — G8427 DOCREV CUR MEDS BY ELIG CLIN: HCPCS | Performed by: UROLOGY

## 2018-05-07 RX ORDER — TADALAFIL 10 MG/1
10 TABLET ORAL PRN
Qty: 6 TABLET | Refills: 6 | Status: SHIPPED | OUTPATIENT
Start: 2018-05-07 | End: 2019-01-25

## 2018-07-13 DIAGNOSIS — E53.8 FOLATE DEFICIENCY: ICD-10-CM

## 2018-07-13 DIAGNOSIS — Z20.2 POSSIBLE EXPOSURE TO STD: Primary | ICD-10-CM

## 2018-07-13 DIAGNOSIS — Z20.2 POSSIBLE EXPOSURE TO STD: ICD-10-CM

## 2018-07-13 LAB — FOLATE: 7 NG/ML (ref 7.3–26.1)

## 2018-07-18 LAB
C. TRACHOMATIS DNA ,URINE: NEGATIVE
N. GONORRHOEAE DNA, URINE: NEGATIVE

## 2018-09-28 RX ORDER — AZELASTINE 1 MG/ML
SPRAY, METERED NASAL
Qty: 3 BOTTLE | Refills: 1 | Status: SHIPPED | OUTPATIENT
Start: 2018-09-28 | End: 2019-02-11 | Stop reason: SDUPTHER

## 2019-01-24 ENCOUNTER — APPOINTMENT (OUTPATIENT)
Dept: GENERAL RADIOLOGY | Age: 44
End: 2019-01-24
Payer: COMMERCIAL

## 2019-01-24 ENCOUNTER — HOSPITAL ENCOUNTER (EMERGENCY)
Age: 44
Discharge: HOME | End: 2019-01-24
Payer: COMMERCIAL

## 2019-01-24 VITALS
OXYGEN SATURATION: 99 % | RESPIRATION RATE: 18 BRPM | WEIGHT: 223 LBS | TEMPERATURE: 98.2 F | HEART RATE: 81 BPM | SYSTOLIC BLOOD PRESSURE: 161 MMHG | HEIGHT: 75 IN | BODY MASS INDEX: 27.73 KG/M2 | DIASTOLIC BLOOD PRESSURE: 76 MMHG

## 2019-01-24 DIAGNOSIS — S61.216A LACERATION OF RIGHT LITTLE FINGER WITHOUT FOREIGN BODY WITHOUT DAMAGE TO NAIL, INITIAL ENCOUNTER: Primary | ICD-10-CM

## 2019-01-24 DIAGNOSIS — Z23 NEED FOR TDAP VACCINATION: ICD-10-CM

## 2019-01-24 PROCEDURE — 73130 X-RAY EXAM OF HAND: CPT

## 2019-01-24 PROCEDURE — 90715 TDAP VACCINE 7 YRS/> IM: CPT | Performed by: NURSE PRACTITIONER

## 2019-01-24 PROCEDURE — 2580000003 HC RX 258: Performed by: NURSE PRACTITIONER

## 2019-01-24 PROCEDURE — 99283 EMERGENCY DEPT VISIT LOW MDM: CPT

## 2019-01-24 PROCEDURE — 90471 IMMUNIZATION ADMIN: CPT | Performed by: NURSE PRACTITIONER

## 2019-01-24 PROCEDURE — 12001 RPR S/N/AX/GEN/TRNK 2.5CM/<: CPT

## 2019-01-24 PROCEDURE — 2500000003 HC RX 250 WO HCPCS: Performed by: NURSE PRACTITIONER

## 2019-01-24 PROCEDURE — 6360000002 HC RX W HCPCS: Performed by: NURSE PRACTITIONER

## 2019-01-24 RX ORDER — CEPHALEXIN 500 MG/1
1000 CAPSULE ORAL 2 TIMES DAILY
Qty: 40 CAPSULE | Refills: 0 | Status: SHIPPED | OUTPATIENT
Start: 2019-01-24 | End: 2019-02-11

## 2019-01-24 RX ORDER — LIDOCAINE HYDROCHLORIDE 10 MG/ML
5 INJECTION, SOLUTION EPIDURAL; INFILTRATION; INTRACAUDAL; PERINEURAL ONCE
Status: COMPLETED | OUTPATIENT
Start: 2019-01-24 | End: 2019-01-24

## 2019-01-24 RX ORDER — MAGNESIUM HYDROXIDE 1200 MG/15ML
250 LIQUID ORAL ONCE
Status: COMPLETED | OUTPATIENT
Start: 2019-01-24 | End: 2019-01-24

## 2019-01-24 RX ORDER — DIAPER,BRIEF,INFANT-TODD,DISP
EACH MISCELLANEOUS 2 TIMES DAILY
Status: DISCONTINUED | OUTPATIENT
Start: 2019-01-24 | End: 2019-01-24 | Stop reason: HOSPADM

## 2019-01-24 RX ADMIN — TETANUS TOXOID, REDUCED DIPHTHERIA TOXOID AND ACELLULAR PERTUSSIS VACCINE, ADSORBED 0.5 ML: 5; 2.5; 8; 8; 2.5 SUSPENSION INTRAMUSCULAR at 16:46

## 2019-01-24 RX ADMIN — LIDOCAINE HYDROCHLORIDE 5 ML: 10 INJECTION, SOLUTION EPIDURAL; INFILTRATION; INTRACAUDAL; PERINEURAL at 16:45

## 2019-01-24 RX ADMIN — SODIUM CHLORIDE 250 ML: 900 IRRIGANT IRRIGATION at 16:45

## 2019-01-24 ASSESSMENT — PAIN SCALES - GENERAL: PAINLEVEL_OUTOF10: 10

## 2019-01-24 ASSESSMENT — ENCOUNTER SYMPTOMS
ABDOMINAL PAIN: 0
BACK PAIN: 0
COUGH: 0
SHORTNESS OF BREATH: 0

## 2019-01-24 ASSESSMENT — PAIN DESCRIPTION - ORIENTATION: ORIENTATION: RIGHT

## 2019-01-24 ASSESSMENT — PAIN DESCRIPTION - LOCATION: LOCATION: HAND

## 2019-01-25 ENCOUNTER — OFFICE VISIT (OUTPATIENT)
Dept: FAMILY MEDICINE CLINIC | Age: 44
End: 2019-01-25
Payer: COMMERCIAL

## 2019-01-25 VITALS
DIASTOLIC BLOOD PRESSURE: 80 MMHG | HEART RATE: 71 BPM | HEIGHT: 75 IN | BODY MASS INDEX: 28.23 KG/M2 | OXYGEN SATURATION: 98 % | WEIGHT: 227 LBS | SYSTOLIC BLOOD PRESSURE: 130 MMHG | RESPIRATION RATE: 16 BRPM | TEMPERATURE: 98 F

## 2019-01-25 DIAGNOSIS — F19.11 SUBSTANCE ABUSE IN REMISSION (HCC): ICD-10-CM

## 2019-01-25 DIAGNOSIS — Z00.00 HEALTH MAINTENANCE EXAMINATION: Primary | ICD-10-CM

## 2019-01-25 DIAGNOSIS — Z72.0 TOBACCO ABUSE: ICD-10-CM

## 2019-01-25 DIAGNOSIS — M54.50 CHRONIC MIDLINE LOW BACK PAIN WITHOUT SCIATICA: ICD-10-CM

## 2019-01-25 DIAGNOSIS — F31.31 BIPOLAR AFFECTIVE DISORDER, CURRENTLY DEPRESSED, MILD (HCC): ICD-10-CM

## 2019-01-25 DIAGNOSIS — G89.29 CHRONIC MIDLINE LOW BACK PAIN WITHOUT SCIATICA: ICD-10-CM

## 2019-01-25 DIAGNOSIS — E78.5 HYPERLIPIDEMIA, UNSPECIFIED HYPERLIPIDEMIA TYPE: ICD-10-CM

## 2019-01-25 PROCEDURE — G8484 FLU IMMUNIZE NO ADMIN: HCPCS | Performed by: FAMILY MEDICINE

## 2019-01-25 PROCEDURE — 99396 PREV VISIT EST AGE 40-64: CPT | Performed by: FAMILY MEDICINE

## 2019-01-25 ASSESSMENT — ENCOUNTER SYMPTOMS
SHORTNESS OF BREATH: 0
ABDOMINAL PAIN: 0

## 2019-01-29 DIAGNOSIS — Z00.00 HEALTH MAINTENANCE EXAMINATION: ICD-10-CM

## 2019-01-29 LAB
ALBUMIN SERPL-MCNC: 4 G/DL (ref 3.9–4.9)
ALP BLD-CCNC: 82 U/L (ref 35–104)
ALT SERPL-CCNC: 14 U/L (ref 0–41)
ANION GAP SERPL CALCULATED.3IONS-SCNC: 11 MEQ/L (ref 7–13)
AST SERPL-CCNC: 16 U/L (ref 0–40)
BASOPHILS ABSOLUTE: 0.1 K/UL (ref 0–0.2)
BASOPHILS RELATIVE PERCENT: 1 %
BILIRUB SERPL-MCNC: <0.2 MG/DL (ref 0–1.2)
BUN BLDV-MCNC: 10 MG/DL (ref 6–20)
CALCIUM SERPL-MCNC: 9.3 MG/DL (ref 8.6–10.2)
CHLORIDE BLD-SCNC: 107 MEQ/L (ref 98–107)
CHOLESTEROL, TOTAL: 181 MG/DL (ref 0–199)
CO2: 25 MEQ/L (ref 22–29)
CREAT SERPL-MCNC: 1.05 MG/DL (ref 0.7–1.2)
EOSINOPHILS ABSOLUTE: 0.4 K/UL (ref 0–0.7)
EOSINOPHILS RELATIVE PERCENT: 4.9 %
GFR AFRICAN AMERICAN: >60
GFR NON-AFRICAN AMERICAN: >60
GLOBULIN: 2.5 G/DL (ref 2.3–3.5)
GLUCOSE BLD-MCNC: 91 MG/DL (ref 74–109)
HCT VFR BLD CALC: 44.8 % (ref 42–52)
HDLC SERPL-MCNC: 48 MG/DL (ref 40–59)
HEMOGLOBIN: 15.4 G/DL (ref 14–18)
LDL CHOLESTEROL CALCULATED: 94 MG/DL (ref 0–129)
LYMPHOCYTES ABSOLUTE: 2.2 K/UL (ref 1–4.8)
LYMPHOCYTES RELATIVE PERCENT: 30.5 %
MCH RBC QN AUTO: 31.9 PG (ref 27–31.3)
MCHC RBC AUTO-ENTMCNC: 34.4 % (ref 33–37)
MCV RBC AUTO: 92.8 FL (ref 80–100)
MONOCYTES ABSOLUTE: 0.4 K/UL (ref 0.2–0.8)
MONOCYTES RELATIVE PERCENT: 5.2 %
NEUTROPHILS ABSOLUTE: 4.2 K/UL (ref 1.4–6.5)
NEUTROPHILS RELATIVE PERCENT: 58.4 %
PDW BLD-RTO: 13.6 % (ref 11.5–14.5)
PLATELET # BLD: 255 K/UL (ref 130–400)
POTASSIUM SERPL-SCNC: 5 MEQ/L (ref 3.5–5.1)
RBC # BLD: 4.83 M/UL (ref 4.7–6.1)
SODIUM BLD-SCNC: 143 MEQ/L (ref 132–144)
TOTAL PROTEIN: 6.5 G/DL (ref 6.4–8.1)
TRIGL SERPL-MCNC: 197 MG/DL (ref 0–200)
WBC # BLD: 7.1 K/UL (ref 4.8–10.8)

## 2019-01-31 ENCOUNTER — TELEPHONE (OUTPATIENT)
Dept: FAMILY MEDICINE CLINIC | Age: 44
End: 2019-01-31

## 2019-01-31 DIAGNOSIS — E53.8 LOW FOLATE: Primary | ICD-10-CM

## 2019-02-11 ENCOUNTER — OFFICE VISIT (OUTPATIENT)
Dept: FAMILY MEDICINE CLINIC | Age: 44
End: 2019-02-11
Payer: COMMERCIAL

## 2019-02-11 VITALS
DIASTOLIC BLOOD PRESSURE: 88 MMHG | TEMPERATURE: 97.8 F | OXYGEN SATURATION: 98 % | BODY MASS INDEX: 28.1 KG/M2 | RESPIRATION RATE: 14 BRPM | HEART RATE: 79 BPM | SYSTOLIC BLOOD PRESSURE: 130 MMHG | HEIGHT: 75 IN | WEIGHT: 226 LBS

## 2019-02-11 DIAGNOSIS — E53.8 FOLATE DEFICIENCY: ICD-10-CM

## 2019-02-11 DIAGNOSIS — S61.219A FINGER LACERATION INVOLVING TENDON, INITIAL ENCOUNTER: Primary | ICD-10-CM

## 2019-02-11 DIAGNOSIS — J30.9 ALLERGIC RHINITIS, UNSPECIFIED SEASONALITY, UNSPECIFIED TRIGGER: ICD-10-CM

## 2019-02-11 LAB — FOLATE: 12.1 NG/ML (ref 7.3–26.1)

## 2019-02-11 PROCEDURE — 99213 OFFICE O/P EST LOW 20 MIN: CPT | Performed by: FAMILY MEDICINE

## 2019-02-11 PROCEDURE — G8484 FLU IMMUNIZE NO ADMIN: HCPCS | Performed by: FAMILY MEDICINE

## 2019-02-11 PROCEDURE — G8427 DOCREV CUR MEDS BY ELIG CLIN: HCPCS | Performed by: FAMILY MEDICINE

## 2019-02-11 PROCEDURE — 4004F PT TOBACCO SCREEN RCVD TLK: CPT | Performed by: FAMILY MEDICINE

## 2019-02-11 PROCEDURE — G8417 CALC BMI ABV UP PARAM F/U: HCPCS | Performed by: FAMILY MEDICINE

## 2019-02-11 RX ORDER — FLUTICASONE PROPIONATE 50 MCG
2 SPRAY, SUSPENSION (ML) NASAL DAILY
Qty: 1 BOTTLE | Refills: 1 | Status: SHIPPED | OUTPATIENT
Start: 2019-02-11 | End: 2019-03-29

## 2019-02-11 RX ORDER — FOLIC ACID 1 MG/1
1 TABLET ORAL DAILY
Qty: 30 TABLET | Refills: 3 | Status: CANCELLED | OUTPATIENT
Start: 2019-02-11

## 2019-02-11 RX ORDER — AZELASTINE 1 MG/ML
SPRAY, METERED NASAL
Qty: 1 BOTTLE | Refills: 1 | Status: SHIPPED | OUTPATIENT
Start: 2019-02-11 | End: 2019-03-29

## 2019-02-11 ASSESSMENT — ENCOUNTER SYMPTOMS
ABDOMINAL PAIN: 0
SHORTNESS OF BREATH: 0

## 2019-02-11 ASSESSMENT — PATIENT HEALTH QUESTIONNAIRE - PHQ9
SUM OF ALL RESPONSES TO PHQ QUESTIONS 1-9: 0
1. LITTLE INTEREST OR PLEASURE IN DOING THINGS: 0
SUM OF ALL RESPONSES TO PHQ9 QUESTIONS 1 & 2: 0
SUM OF ALL RESPONSES TO PHQ QUESTIONS 1-9: 0
2. FEELING DOWN, DEPRESSED OR HOPELESS: 0

## 2019-02-13 ENCOUNTER — TELEPHONE (OUTPATIENT)
Dept: FAMILY MEDICINE CLINIC | Age: 44
End: 2019-02-13

## 2019-02-13 DIAGNOSIS — S61.219A FINGER LACERATION INVOLVING TENDON, INITIAL ENCOUNTER: Primary | ICD-10-CM

## 2019-02-20 ENCOUNTER — OFFICE VISIT (OUTPATIENT)
Dept: FAMILY MEDICINE CLINIC | Age: 44
End: 2019-02-20
Payer: COMMERCIAL

## 2019-02-20 VITALS
SYSTOLIC BLOOD PRESSURE: 128 MMHG | TEMPERATURE: 100.4 F | OXYGEN SATURATION: 98 % | HEART RATE: 91 BPM | WEIGHT: 228 LBS | RESPIRATION RATE: 14 BRPM | BODY MASS INDEX: 28.35 KG/M2 | DIASTOLIC BLOOD PRESSURE: 86 MMHG | HEIGHT: 75 IN

## 2019-02-20 DIAGNOSIS — J20.9 ACUTE BRONCHITIS, UNSPECIFIED ORGANISM: Primary | ICD-10-CM

## 2019-02-20 DIAGNOSIS — R50.9 FEVER AND CHILLS: ICD-10-CM

## 2019-02-20 DIAGNOSIS — J02.9 ACUTE PHARYNGITIS, UNSPECIFIED ETIOLOGY: ICD-10-CM

## 2019-02-20 LAB
INFLUENZA A ANTIBODY: NORMAL
INFLUENZA B ANTIBODY: NORMAL

## 2019-02-20 PROCEDURE — G8417 CALC BMI ABV UP PARAM F/U: HCPCS | Performed by: FAMILY MEDICINE

## 2019-02-20 PROCEDURE — G8484 FLU IMMUNIZE NO ADMIN: HCPCS | Performed by: FAMILY MEDICINE

## 2019-02-20 PROCEDURE — 87804 INFLUENZA ASSAY W/OPTIC: CPT | Performed by: FAMILY MEDICINE

## 2019-02-20 PROCEDURE — 99213 OFFICE O/P EST LOW 20 MIN: CPT | Performed by: FAMILY MEDICINE

## 2019-02-20 PROCEDURE — 4004F PT TOBACCO SCREEN RCVD TLK: CPT | Performed by: FAMILY MEDICINE

## 2019-02-20 PROCEDURE — G8427 DOCREV CUR MEDS BY ELIG CLIN: HCPCS | Performed by: FAMILY MEDICINE

## 2019-02-20 RX ORDER — AZITHROMYCIN 250 MG/1
250 TABLET, FILM COATED ORAL SEE ADMIN INSTRUCTIONS
Qty: 6 TABLET | Refills: 0 | Status: SHIPPED | OUTPATIENT
Start: 2019-02-20 | End: 2019-03-07 | Stop reason: SDUPTHER

## 2019-02-20 RX ORDER — DEXTROMETHORPHAN HYDROBROMIDE AND PROMETHAZINE HYDROCHLORIDE 15; 6.25 MG/5ML; MG/5ML
5 SYRUP ORAL 4 TIMES DAILY PRN
Qty: 1 BOTTLE | Refills: 0 | Status: SHIPPED | OUTPATIENT
Start: 2019-02-20 | End: 2019-02-27

## 2019-02-20 ASSESSMENT — ENCOUNTER SYMPTOMS
SHORTNESS OF BREATH: 0
ABDOMINAL DISTENTION: 0
TROUBLE SWALLOWING: 0

## 2019-03-07 RX ORDER — AZITHROMYCIN 250 MG/1
250 TABLET, FILM COATED ORAL SEE ADMIN INSTRUCTIONS
Qty: 6 TABLET | Refills: 0 | Status: SHIPPED | OUTPATIENT
Start: 2019-03-07 | End: 2019-03-12

## 2019-03-29 ENCOUNTER — OFFICE VISIT (OUTPATIENT)
Dept: FAMILY MEDICINE CLINIC | Age: 44
End: 2019-03-29
Payer: COMMERCIAL

## 2019-03-29 VITALS
WEIGHT: 227 LBS | HEART RATE: 74 BPM | DIASTOLIC BLOOD PRESSURE: 82 MMHG | SYSTOLIC BLOOD PRESSURE: 150 MMHG | OXYGEN SATURATION: 98 % | TEMPERATURE: 96.9 F | HEIGHT: 75 IN | RESPIRATION RATE: 16 BRPM | BODY MASS INDEX: 28.23 KG/M2

## 2019-03-29 DIAGNOSIS — Z72.0 TOBACCO ABUSE: Primary | ICD-10-CM

## 2019-03-29 PROCEDURE — G8417 CALC BMI ABV UP PARAM F/U: HCPCS | Performed by: FAMILY MEDICINE

## 2019-03-29 PROCEDURE — G8427 DOCREV CUR MEDS BY ELIG CLIN: HCPCS | Performed by: FAMILY MEDICINE

## 2019-03-29 PROCEDURE — 4004F PT TOBACCO SCREEN RCVD TLK: CPT | Performed by: FAMILY MEDICINE

## 2019-03-29 PROCEDURE — 99214 OFFICE O/P EST MOD 30 MIN: CPT | Performed by: FAMILY MEDICINE

## 2019-03-29 PROCEDURE — G8484 FLU IMMUNIZE NO ADMIN: HCPCS | Performed by: FAMILY MEDICINE

## 2019-03-29 RX ORDER — VARENICLINE TARTRATE 25 MG
KIT ORAL
Qty: 53 EACH | Refills: 0 | Status: SHIPPED | OUTPATIENT
Start: 2019-03-29 | End: 2020-01-17

## 2019-03-29 RX ORDER — IPRATROPIUM BROMIDE 42 UG/1
2 SPRAY, METERED NASAL
COMMUNITY
Start: 2019-03-22 | End: 2019-06-11

## 2019-03-29 RX ORDER — NICOTINE 21 MG/24HR
1 PATCH, TRANSDERMAL 24 HOURS TRANSDERMAL EVERY 24 HOURS
Qty: 14 PATCH | Refills: 0 | Status: CANCELLED | OUTPATIENT
Start: 2019-05-11 | End: 2019-05-25

## 2019-03-29 RX ORDER — NICOTINE 21 MG/24HR
1 PATCH, TRANSDERMAL 24 HOURS TRANSDERMAL EVERY 24 HOURS
Qty: 42 PATCH | Refills: 0 | Status: CANCELLED | OUTPATIENT
Start: 2019-03-29 | End: 2019-05-10

## 2019-04-08 ENCOUNTER — HOSPITAL ENCOUNTER (OUTPATIENT)
Dept: ORTHOPEDIC SURGERY | Age: 44
Discharge: HOME | End: 2019-04-10
Payer: COMMERCIAL

## 2019-04-08 DIAGNOSIS — M79.644 CHRONIC PAIN OF BOTH THUMBS: ICD-10-CM

## 2019-04-08 DIAGNOSIS — G89.29 CHRONIC PAIN OF BOTH THUMBS: ICD-10-CM

## 2019-04-08 DIAGNOSIS — M79.645 CHRONIC PAIN OF BOTH THUMBS: ICD-10-CM

## 2019-04-08 PROCEDURE — 73140 X-RAY EXAM OF FINGER(S): CPT

## 2019-04-16 ENCOUNTER — TELEPHONE (OUTPATIENT)
Dept: FAMILY MEDICINE CLINIC | Age: 44
End: 2019-04-16

## 2019-04-16 ENCOUNTER — APPOINTMENT (OUTPATIENT)
Dept: CT IMAGING | Age: 44
End: 2019-04-16
Payer: COMMERCIAL

## 2019-04-16 ENCOUNTER — APPOINTMENT (OUTPATIENT)
Dept: GENERAL RADIOLOGY | Age: 44
End: 2019-04-16
Payer: COMMERCIAL

## 2019-04-16 ENCOUNTER — HOSPITAL ENCOUNTER (EMERGENCY)
Age: 44
Discharge: HOME | End: 2019-04-16
Attending: EMERGENCY MEDICINE
Payer: COMMERCIAL

## 2019-04-16 VITALS
HEIGHT: 75 IN | TEMPERATURE: 98.6 F | BODY MASS INDEX: 27.98 KG/M2 | DIASTOLIC BLOOD PRESSURE: 68 MMHG | WEIGHT: 225 LBS | OXYGEN SATURATION: 100 % | SYSTOLIC BLOOD PRESSURE: 132 MMHG | RESPIRATION RATE: 20 BRPM | HEART RATE: 78 BPM
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
## 2019-04-16 DIAGNOSIS — S06.0X0A CONCUSSION WITHOUT LOSS OF CONSCIOUSNESS, INITIAL ENCOUNTER: ICD-10-CM

## 2019-04-16 DIAGNOSIS — S80.02XA CONTUSION OF LEFT KNEE, INITIAL ENCOUNTER: ICD-10-CM

## 2019-04-16 PROCEDURE — 99284 EMERGENCY DEPT VISIT MOD MDM: CPT

## 2019-04-16 PROCEDURE — 6370000000 HC RX 637 (ALT 250 FOR IP): Performed by: EMERGENCY MEDICINE

## 2019-04-16 PROCEDURE — 73564 X-RAY EXAM KNEE 4 OR MORE: CPT

## 2019-04-16 PROCEDURE — 70450 CT HEAD/BRAIN W/O DYE: CPT

## 2019-04-16 PROCEDURE — 72125 CT NECK SPINE W/O DYE: CPT

## 2019-04-16 RX ORDER — OXYCODONE HYDROCHLORIDE AND ACETAMINOPHEN 5; 325 MG/1; MG/1
1 TABLET ORAL ONCE
Status: COMPLETED | OUTPATIENT
Start: 2019-04-16 | End: 2019-04-16

## 2019-04-16 RX ADMIN — OXYCODONE HYDROCHLORIDE AND ACETAMINOPHEN 1 TABLET: 5; 325 TABLET ORAL at 15:36

## 2019-04-16 ASSESSMENT — PAIN DESCRIPTION - PAIN TYPE
TYPE: ACUTE PAIN
TYPE: ACUTE PAIN

## 2019-04-16 ASSESSMENT — ENCOUNTER SYMPTOMS
VOMITING: 0
NAUSEA: 0
BACK PAIN: 0
DIARRHEA: 0
ABDOMINAL PAIN: 0
SHORTNESS OF BREATH: 0
SORE THROAT: 0

## 2019-04-16 ASSESSMENT — PAIN SCALES - GENERAL: PAINLEVEL_OUTOF10: 6

## 2019-04-16 ASSESSMENT — PAIN DESCRIPTION - FREQUENCY: FREQUENCY: INTERMITTENT

## 2019-04-16 ASSESSMENT — PAIN DESCRIPTION - DESCRIPTORS: DESCRIPTORS: ACHING

## 2019-04-16 ASSESSMENT — PAIN DESCRIPTION - ONSET: ONSET: ON-GOING

## 2019-04-16 ASSESSMENT — PAIN DESCRIPTION - LOCATION
LOCATION: HEAD
LOCATION: HEAD

## 2019-04-16 ASSESSMENT — PAIN DESCRIPTION - ORIENTATION: ORIENTATION: RIGHT;LEFT
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
## 2019-06-11 ENCOUNTER — OFFICE VISIT (OUTPATIENT)
Dept: FAMILY MEDICINE CLINIC | Age: 44
End: 2019-06-11
Payer: COMMERCIAL

## 2019-06-11 VITALS
WEIGHT: 230.2 LBS | SYSTOLIC BLOOD PRESSURE: 110 MMHG | HEIGHT: 75 IN | BODY MASS INDEX: 28.62 KG/M2 | HEART RATE: 70 BPM | TEMPERATURE: 97.8 F | OXYGEN SATURATION: 99 % | DIASTOLIC BLOOD PRESSURE: 66 MMHG | RESPIRATION RATE: 10 BRPM

## 2019-06-11 DIAGNOSIS — R09.82 POST-NASAL DRIP: ICD-10-CM

## 2019-06-11 DIAGNOSIS — R51.9 PERSISTENT HEADACHES: ICD-10-CM

## 2019-06-11 DIAGNOSIS — Z72.0 TOBACCO ABUSE: ICD-10-CM
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
## 2019-06-11 DIAGNOSIS — M25.562 ACUTE PAIN OF LEFT KNEE: ICD-10-CM

## 2019-06-11 PROCEDURE — G8427 DOCREV CUR MEDS BY ELIG CLIN: HCPCS | Performed by: FAMILY MEDICINE

## 2019-06-11 PROCEDURE — 4004F PT TOBACCO SCREEN RCVD TLK: CPT | Performed by: FAMILY MEDICINE

## 2019-06-11 PROCEDURE — 99214 OFFICE O/P EST MOD 30 MIN: CPT | Performed by: FAMILY MEDICINE

## 2019-06-11 PROCEDURE — G8417 CALC BMI ABV UP PARAM F/U: HCPCS | Performed by: FAMILY MEDICINE

## 2019-06-11 RX ORDER — VARENICLINE TARTRATE 25 MG
KIT ORAL
Qty: 60 TABLET | Refills: 0 | Status: SHIPPED | OUTPATIENT
Start: 2019-06-11 | End: 2020-01-17

## 2019-06-11 ASSESSMENT — ENCOUNTER SYMPTOMS: ABDOMINAL PAIN: 0
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
## 2019-06-24 ENCOUNTER — TELEPHONE (OUTPATIENT)
Dept: FAMILY MEDICINE CLINIC | Age: 44
End: 2019-06-24

## 2019-06-24 DIAGNOSIS — R51.9 PERSISTENT HEADACHES: Primary | ICD-10-CM

## 2019-06-26 ENCOUNTER — APPOINTMENT (OUTPATIENT)
Dept: MRI IMAGING | Age: 44
End: 2019-06-26
Payer: COMMERCIAL

## 2019-06-26 ENCOUNTER — HOSPITAL ENCOUNTER (OUTPATIENT)
Dept: MRI IMAGING | Age: 44
Discharge: HOME | End: 2019-06-28
Payer: COMMERCIAL

## 2019-06-26 DIAGNOSIS — M25.562 ACUTE PAIN OF LEFT KNEE: ICD-10-CM
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
## 2019-06-26 PROCEDURE — 73721 MRI JNT OF LWR EXTRE W/O DYE: CPT

## 2019-07-09 ENCOUNTER — TELEPHONE (OUTPATIENT)
Dept: FAMILY MEDICINE CLINIC | Age: 44
End: 2019-07-09

## 2019-07-18 DIAGNOSIS — Z72.0 TOBACCO ABUSE: ICD-10-CM

## 2019-07-18 RX ORDER — VARENICLINE TARTRATE 25 MG
KIT ORAL
Status: CANCELLED | OUTPATIENT
Start: 2019-07-18

## 2019-07-18 RX ORDER — VARENICLINE TARTRATE 1 MG/1
1 TABLET, FILM COATED ORAL 2 TIMES DAILY
Qty: 60 TABLET | Refills: 0 | Status: SHIPPED | OUTPATIENT
Start: 2019-07-18 | End: 2019-12-10 | Stop reason: SDUPTHER

## 2019-07-25 ENCOUNTER — HOSPITAL ENCOUNTER (OUTPATIENT)
Dept: PREADMISSION TESTING | Age: 44
Discharge: HOME | End: 2019-07-29
Payer: COMMERCIAL

## 2019-07-25 VITALS
HEART RATE: 80 BPM | WEIGHT: 229 LBS | SYSTOLIC BLOOD PRESSURE: 121 MMHG | DIASTOLIC BLOOD PRESSURE: 71 MMHG | BODY MASS INDEX: 29.39 KG/M2 | OXYGEN SATURATION: 98 % | HEIGHT: 74 IN | TEMPERATURE: 97.7 F | RESPIRATION RATE: 20 BRPM

## 2019-07-25 DIAGNOSIS — J34.2 DNS (DEVIATED NASAL SEPTUM): ICD-10-CM

## 2019-07-25 DIAGNOSIS — F17.200 SMOKER: Chronic | ICD-10-CM

## 2019-07-25 PROBLEM — J31.0 CHRONIC RHINITIS: Status: ACTIVE | Noted: 2019-02-21

## 2019-07-25 PROBLEM — J34.89 REFRACTORY OBSTRUCTION OF NASAL AIRWAY: Status: ACTIVE | Noted: 2019-03-22

## 2019-07-25 PROBLEM — J34.3 HYPERTROPHY OF INFERIOR NASAL TURBINATE: Status: ACTIVE | Noted: 2019-03-22

## 2019-07-25 LAB
ANION GAP SERPL CALCULATED.3IONS-SCNC: 16 MEQ/L (ref 9–15)
BUN BLDV-MCNC: 16 MG/DL (ref 6–20)
CALCIUM SERPL-MCNC: 9.3 MG/DL (ref 8.5–9.9)
CHLORIDE BLD-SCNC: 104 MEQ/L (ref 95–107)
CO2: 25 MEQ/L (ref 20–31)
CREAT SERPL-MCNC: 1.09 MG/DL (ref 0.7–1.2)
EKG ATRIAL RATE: 82 BPM
EKG P AXIS: 39 DEGREES
EKG P-R INTERVAL: 160 MS
EKG Q-T INTERVAL: 386 MS
EKG QRS DURATION: 92 MS
EKG QTC CALCULATION (BAZETT): 450 MS
EKG R AXIS: 58 DEGREES
EKG T AXIS: 47 DEGREES
EKG VENTRICULAR RATE: 82 BPM
GFR AFRICAN AMERICAN: >60
GFR NON-AFRICAN AMERICAN: >60
GLUCOSE BLD-MCNC: 124 MG/DL (ref 70–99)
HCT VFR BLD CALC: 42.2 % (ref 42–52)
HEMOGLOBIN: 14.8 G/DL (ref 14–18)
MCH RBC QN AUTO: 32.3 PG (ref 27–31.3)
MCHC RBC AUTO-ENTMCNC: 35 % (ref 33–37)
MCV RBC AUTO: 92.3 FL (ref 80–100)
PDW BLD-RTO: 13.2 % (ref 11.5–14.5)
PLATELET # BLD: 237 K/UL (ref 130–400)
POTASSIUM SERPL-SCNC: 4 MEQ/L (ref 3.4–4.9)
RBC # BLD: 4.57 M/UL (ref 4.7–6.1)
SODIUM BLD-SCNC: 145 MEQ/L (ref 135–144)
WBC # BLD: 6.5 K/UL (ref 4.8–10.8)

## 2019-07-25 PROCEDURE — 80048 BASIC METABOLIC PNL TOTAL CA: CPT

## 2019-07-25 PROCEDURE — 93005 ELECTROCARDIOGRAM TRACING: CPT | Performed by: NURSE PRACTITIONER

## 2019-07-25 PROCEDURE — 85027 COMPLETE CBC AUTOMATED: CPT

## 2019-07-25 RX ORDER — SODIUM CHLORIDE, SODIUM LACTATE, POTASSIUM CHLORIDE, CALCIUM CHLORIDE 600; 310; 30; 20 MG/100ML; MG/100ML; MG/100ML; MG/100ML
INJECTION, SOLUTION INTRAVENOUS CONTINUOUS
Status: CANCELLED | OUTPATIENT
Start: 2019-08-01

## 2019-07-25 RX ORDER — SODIUM CHLORIDE 0.9 % (FLUSH) 0.9 %
10 SYRINGE (ML) INJECTION EVERY 12 HOURS SCHEDULED
Status: CANCELLED | OUTPATIENT
Start: 2019-08-01

## 2019-07-25 RX ORDER — LIDOCAINE HYDROCHLORIDE 10 MG/ML
1 INJECTION, SOLUTION EPIDURAL; INFILTRATION; INTRACAUDAL; PERINEURAL
Status: CANCELLED | OUTPATIENT
Start: 2019-08-01 | End: 2019-08-01

## 2019-07-25 RX ORDER — SODIUM CHLORIDE 0.9 % (FLUSH) 0.9 %
10 SYRINGE (ML) INJECTION PRN
Status: CANCELLED | OUTPATIENT
Start: 2019-08-01

## 2019-07-25 ASSESSMENT — ENCOUNTER SYMPTOMS
SHORTNESS OF BREATH: 0
WHEEZING: 0
CONSTIPATION: 0
SORE THROAT: 0
ABDOMINAL PAIN: 0
STRIDOR: 0
VOMITING: 0
COUGH: 0
NAUSEA: 0
RHINORRHEA: 1
CHEST TIGHTNESS: 0
EYES NEGATIVE: 1
BACK PAIN: 0
ALLERGIC/IMMUNOLOGIC NEGATIVE: 1
DIARRHEA: 0

## 2019-07-25 NOTE — H&P
Cigarettes    Smokeless tobacco: Never Used   Substance and Sexual Activity    Alcohol use: No    Drug use: No     Comment: pt states he has been clean since March 2017    Sexual activity: Yes   Lifestyle    Physical activity:     Days per week: Not on file     Minutes per session: Not on file    Stress: Not on file   Relationships    Social connections:     Talks on phone: Not on file     Gets together: Not on file     Attends Faith service: Not on file     Active member of club or organization: Not on file     Attends meetings of clubs or organizations: Not on file     Relationship status: Not on file    Intimate partner violence:     Fear of current or ex partner: Not on file     Emotionally abused: Not on file     Physically abused: Not on file     Forced sexual activity: Not on file   Other Topics Concern    Not on file   Social History Narrative    Not on file       Family History:   No family history on file. Review of Systems   Constitutional: Negative. Negative for chills and fever. HENT: Positive for congestion, dental problem, postnasal drip and rhinorrhea. Negative for sore throat. Eyes: Negative. Negative for visual disturbance. Respiratory: Negative for cough, chest tightness, shortness of breath, wheezing and stridor. Cardiovascular: Negative for chest pain and palpitations. Gastrointestinal: Negative for abdominal pain, constipation, diarrhea, nausea and vomiting. Endocrine: Negative. Genitourinary: Negative. Negative for dysuria and frequency. Musculoskeletal: Negative for back pain, myalgias and neck pain. Skin: Negative. Allergic/Immunologic: Negative. Neurological: Negative. Negative for seizures and headaches. Hematological: Negative. Psychiatric/Behavioral: Negative. Vitals: There were no vitals taken for this visit. Physical Exam   Constitutional: He is oriented to person, place, and time.  He appears well-developed and

## 2019-07-26 PROCEDURE — 93010 ELECTROCARDIOGRAM REPORT: CPT | Performed by: INTERNAL MEDICINE

## 2019-08-01 ENCOUNTER — HOSPITAL ENCOUNTER (OUTPATIENT)
Age: 44
Setting detail: OUTPATIENT SURGERY
Discharge: HOME | End: 2019-08-01
Attending: OTOLARYNGOLOGY | Admitting: OTOLARYNGOLOGY
Payer: COMMERCIAL

## 2019-08-01 ENCOUNTER — ANESTHESIA (OUTPATIENT)
Dept: OPERATING ROOM | Age: 44
End: 2019-08-01
Payer: COMMERCIAL

## 2019-08-01 ENCOUNTER — ANESTHESIA EVENT (OUTPATIENT)
Dept: OPERATING ROOM | Age: 44
End: 2019-08-01
Payer: COMMERCIAL

## 2019-08-01 VITALS
OXYGEN SATURATION: 96 % | HEIGHT: 74 IN | HEART RATE: 76 BPM | SYSTOLIC BLOOD PRESSURE: 133 MMHG | WEIGHT: 229 LBS | BODY MASS INDEX: 29.39 KG/M2 | RESPIRATION RATE: 18 BRPM | DIASTOLIC BLOOD PRESSURE: 58 MMHG | TEMPERATURE: 97.7 F

## 2019-08-01 VITALS — TEMPERATURE: 97.7 F | DIASTOLIC BLOOD PRESSURE: 53 MMHG | OXYGEN SATURATION: 100 % | SYSTOLIC BLOOD PRESSURE: 88 MMHG

## 2019-08-01 DIAGNOSIS — J34.3 HYPERTROPHY OF NASAL TURBINATES: ICD-10-CM

## 2019-08-01 DIAGNOSIS — J34.2 DNS (DEVIATED NASAL SEPTUM): ICD-10-CM

## 2019-08-01 DIAGNOSIS — J34.89 REFRACTORY OBSTRUCTION OF NASAL AIRWAY: ICD-10-CM

## 2019-08-01 DIAGNOSIS — J31.0 CHRONIC RHINITIS: Primary | ICD-10-CM

## 2019-08-01 DIAGNOSIS — F17.200 SMOKER: Chronic | ICD-10-CM

## 2019-08-01 PROCEDURE — 6360000002 HC RX W HCPCS: Performed by: NURSE PRACTITIONER

## 2019-08-01 PROCEDURE — 7100000011 HC PHASE II RECOVERY - ADDTL 15 MIN: Performed by: OTOLARYNGOLOGY

## 2019-08-01 PROCEDURE — 2580000003 HC RX 258: Performed by: NURSE PRACTITIONER

## 2019-08-01 PROCEDURE — 3700000000 HC ANESTHESIA ATTENDED CARE: Performed by: OTOLARYNGOLOGY

## 2019-08-01 PROCEDURE — 6360000002 HC RX W HCPCS: Performed by: ANESTHESIOLOGY

## 2019-08-01 PROCEDURE — 7100000001 HC PACU RECOVERY - ADDTL 15 MIN: Performed by: OTOLARYNGOLOGY

## 2019-08-01 PROCEDURE — 6370000000 HC RX 637 (ALT 250 FOR IP): Performed by: OTOLARYNGOLOGY

## 2019-08-01 PROCEDURE — 2500000003 HC RX 250 WO HCPCS: Performed by: OTOLARYNGOLOGY

## 2019-08-01 PROCEDURE — 3600000014 HC SURGERY LEVEL 4 ADDTL 15MIN: Performed by: OTOLARYNGOLOGY

## 2019-08-01 PROCEDURE — 88304 TISSUE EXAM BY PATHOLOGIST: CPT

## 2019-08-01 PROCEDURE — 2580000003 HC RX 258: Performed by: OTOLARYNGOLOGY

## 2019-08-01 PROCEDURE — 7100000010 HC PHASE II RECOVERY - FIRST 15 MIN: Performed by: OTOLARYNGOLOGY

## 2019-08-01 PROCEDURE — 3700000001 HC ADD 15 MINUTES (ANESTHESIA): Performed by: OTOLARYNGOLOGY

## 2019-08-01 PROCEDURE — 2720000010 HC SURG SUPPLY STERILE: Performed by: OTOLARYNGOLOGY

## 2019-08-01 PROCEDURE — 7100000000 HC PACU RECOVERY - FIRST 15 MIN: Performed by: OTOLARYNGOLOGY

## 2019-08-01 PROCEDURE — 2500000003 HC RX 250 WO HCPCS: Performed by: NURSE ANESTHETIST, CERTIFIED REGISTERED

## 2019-08-01 PROCEDURE — 6360000002 HC RX W HCPCS: Performed by: NURSE ANESTHETIST, CERTIFIED REGISTERED

## 2019-08-01 PROCEDURE — 3600000004 HC SURGERY LEVEL 4 BASE: Performed by: OTOLARYNGOLOGY

## 2019-08-01 PROCEDURE — 2709999900 HC NON-CHARGEABLE SUPPLY: Performed by: OTOLARYNGOLOGY

## 2019-08-01 PROCEDURE — 88311 DECALCIFY TISSUE: CPT

## 2019-08-01 RX ORDER — SODIUM CHLORIDE, SODIUM LACTATE, POTASSIUM CHLORIDE, CALCIUM CHLORIDE 600; 310; 30; 20 MG/100ML; MG/100ML; MG/100ML; MG/100ML
INJECTION, SOLUTION INTRAVENOUS CONTINUOUS
Status: DISCONTINUED | OUTPATIENT
Start: 2019-08-01 | End: 2019-08-01 | Stop reason: HOSPADM

## 2019-08-01 RX ORDER — METOCLOPRAMIDE HYDROCHLORIDE 5 MG/ML
10 INJECTION INTRAMUSCULAR; INTRAVENOUS
Status: DISCONTINUED | OUTPATIENT
Start: 2019-08-01 | End: 2019-08-01 | Stop reason: HOSPADM

## 2019-08-01 RX ORDER — LIDOCAINE HYDROCHLORIDE 20 MG/ML
INJECTION, SOLUTION INTRAVENOUS PRN
Status: DISCONTINUED | OUTPATIENT
Start: 2019-08-01 | End: 2019-08-01 | Stop reason: SDUPTHER

## 2019-08-01 RX ORDER — MAGNESIUM HYDROXIDE 1200 MG/15ML
LIQUID ORAL CONTINUOUS PRN
Status: COMPLETED | OUTPATIENT
Start: 2019-08-01 | End: 2019-08-01

## 2019-08-01 RX ORDER — FENTANYL CITRATE 50 UG/ML
50 INJECTION, SOLUTION INTRAMUSCULAR; INTRAVENOUS EVERY 10 MIN PRN
Status: DISCONTINUED | OUTPATIENT
Start: 2019-08-01 | End: 2019-08-01 | Stop reason: HOSPADM

## 2019-08-01 RX ORDER — DIPHENHYDRAMINE HYDROCHLORIDE 50 MG/ML
12.5 INJECTION INTRAMUSCULAR; INTRAVENOUS
Status: DISCONTINUED | OUTPATIENT
Start: 2019-08-01 | End: 2019-08-01 | Stop reason: HOSPADM

## 2019-08-01 RX ORDER — ONDANSETRON 2 MG/ML
4 INJECTION INTRAMUSCULAR; INTRAVENOUS
Status: DISCONTINUED | OUTPATIENT
Start: 2019-08-01 | End: 2019-08-01 | Stop reason: HOSPADM

## 2019-08-01 RX ORDER — DEXAMETHASONE SODIUM PHOSPHATE 10 MG/ML
INJECTION INTRAMUSCULAR; INTRAVENOUS PRN
Status: DISCONTINUED | OUTPATIENT
Start: 2019-08-01 | End: 2019-08-01 | Stop reason: SDUPTHER

## 2019-08-01 RX ORDER — LIDOCAINE HYDROCHLORIDE AND EPINEPHRINE 10; 10 MG/ML; UG/ML
INJECTION, SOLUTION INFILTRATION; PERINEURAL PRN
Status: DISCONTINUED | OUTPATIENT
Start: 2019-08-01 | End: 2019-08-01 | Stop reason: ALTCHOICE

## 2019-08-01 RX ORDER — CEPHALEXIN 250 MG/1
250 CAPSULE ORAL 3 TIMES DAILY
Qty: 30 CAPSULE | Refills: 0 | Status: SHIPPED | OUTPATIENT
Start: 2019-08-01 | End: 2019-08-11

## 2019-08-01 RX ORDER — SODIUM CHLORIDE 0.9 % (FLUSH) 0.9 %
10 SYRINGE (ML) INJECTION EVERY 12 HOURS SCHEDULED
Status: DISCONTINUED | OUTPATIENT
Start: 2019-08-01 | End: 2019-08-01 | Stop reason: HOSPADM

## 2019-08-01 RX ORDER — WOUND DRESSING ADHESIVE - LIQUID
LIQUID MISCELLANEOUS PRN
Status: DISCONTINUED | OUTPATIENT
Start: 2019-08-01 | End: 2019-08-01 | Stop reason: ALTCHOICE

## 2019-08-01 RX ORDER — HYDROCODONE BITARTRATE AND ACETAMINOPHEN 5; 325 MG/1; MG/1
1 TABLET ORAL EVERY 4 HOURS PRN
Qty: 10 TABLET | Refills: 0 | Status: SHIPPED | OUTPATIENT
Start: 2019-08-01 | End: 2019-08-08

## 2019-08-01 RX ORDER — SODIUM CHLORIDE 0.9 % (FLUSH) 0.9 %
10 SYRINGE (ML) INJECTION PRN
Status: DISCONTINUED | OUTPATIENT
Start: 2019-08-01 | End: 2019-08-01 | Stop reason: HOSPADM

## 2019-08-01 RX ORDER — PROPOFOL 10 MG/ML
INJECTION, EMULSION INTRAVENOUS PRN
Status: DISCONTINUED | OUTPATIENT
Start: 2019-08-01 | End: 2019-08-01 | Stop reason: SDUPTHER

## 2019-08-01 RX ORDER — ROCURONIUM BROMIDE 10 MG/ML
INJECTION, SOLUTION INTRAVENOUS PRN
Status: DISCONTINUED | OUTPATIENT
Start: 2019-08-01 | End: 2019-08-01 | Stop reason: SDUPTHER

## 2019-08-01 RX ORDER — GINSENG 100 MG
CAPSULE ORAL PRN
Status: DISCONTINUED | OUTPATIENT
Start: 2019-08-01 | End: 2019-08-01 | Stop reason: ALTCHOICE

## 2019-08-01 RX ORDER — MIDAZOLAM HYDROCHLORIDE 1 MG/ML
INJECTION INTRAMUSCULAR; INTRAVENOUS PRN
Status: DISCONTINUED | OUTPATIENT
Start: 2019-08-01 | End: 2019-08-01 | Stop reason: SDUPTHER

## 2019-08-01 RX ORDER — MEPERIDINE HYDROCHLORIDE 25 MG/ML
12.5 INJECTION INTRAMUSCULAR; INTRAVENOUS; SUBCUTANEOUS EVERY 5 MIN PRN
Status: DISCONTINUED | OUTPATIENT
Start: 2019-08-01 | End: 2019-08-01 | Stop reason: HOSPADM

## 2019-08-01 RX ORDER — HYDROCODONE BITARTRATE AND ACETAMINOPHEN 5; 325 MG/1; MG/1
1 TABLET ORAL PRN
Status: DISCONTINUED | OUTPATIENT
Start: 2019-08-01 | End: 2019-08-01 | Stop reason: HOSPADM

## 2019-08-01 RX ORDER — ONDANSETRON 2 MG/ML
INJECTION INTRAMUSCULAR; INTRAVENOUS PRN
Status: DISCONTINUED | OUTPATIENT
Start: 2019-08-01 | End: 2019-08-01 | Stop reason: SDUPTHER

## 2019-08-01 RX ORDER — FENTANYL CITRATE 50 UG/ML
INJECTION, SOLUTION INTRAMUSCULAR; INTRAVENOUS PRN
Status: DISCONTINUED | OUTPATIENT
Start: 2019-08-01 | End: 2019-08-01 | Stop reason: SDUPTHER

## 2019-08-01 RX ORDER — LIDOCAINE HYDROCHLORIDE 10 MG/ML
1 INJECTION, SOLUTION EPIDURAL; INFILTRATION; INTRACAUDAL; PERINEURAL
Status: DISCONTINUED | OUTPATIENT
Start: 2019-08-01 | End: 2019-08-01 | Stop reason: HOSPADM

## 2019-08-01 RX ORDER — HYDROCODONE BITARTRATE AND ACETAMINOPHEN 5; 325 MG/1; MG/1
2 TABLET ORAL PRN
Status: DISCONTINUED | OUTPATIENT
Start: 2019-08-01 | End: 2019-08-01 | Stop reason: HOSPADM

## 2019-08-01 RX ADMIN — FENTANYL CITRATE 50 MCG: 50 INJECTION, SOLUTION INTRAMUSCULAR; INTRAVENOUS at 10:03

## 2019-08-01 RX ADMIN — Medication 2 G: at 09:59

## 2019-08-01 RX ADMIN — FENTANYL CITRATE 50 MCG: 50 INJECTION, SOLUTION INTRAMUSCULAR; INTRAVENOUS at 10:04

## 2019-08-01 RX ADMIN — SODIUM CHLORIDE, POTASSIUM CHLORIDE, SODIUM LACTATE AND CALCIUM CHLORIDE: 600; 310; 30; 20 INJECTION, SOLUTION INTRAVENOUS at 10:34

## 2019-08-01 RX ADMIN — ONDANSETRON 4 MG: 2 INJECTION INTRAMUSCULAR; INTRAVENOUS at 10:47

## 2019-08-01 RX ADMIN — SUGAMMADEX 200 MG: 100 INJECTION, SOLUTION INTRAVENOUS at 10:49

## 2019-08-01 RX ADMIN — LIDOCAINE HYDROCHLORIDE 50 MG: 20 INJECTION, SOLUTION INTRAVENOUS at 10:03

## 2019-08-01 RX ADMIN — FENTANYL CITRATE 50 MCG: 50 INJECTION, SOLUTION INTRAMUSCULAR; INTRAVENOUS at 11:18

## 2019-08-01 RX ADMIN — ROCURONIUM BROMIDE 50 MG: 10 INJECTION INTRAVENOUS at 10:03

## 2019-08-01 RX ADMIN — SODIUM CHLORIDE, POTASSIUM CHLORIDE, SODIUM LACTATE AND CALCIUM CHLORIDE: 600; 310; 30; 20 INJECTION, SOLUTION INTRAVENOUS at 08:32

## 2019-08-01 RX ADMIN — PROPOFOL 200 MG: 10 INJECTION, EMULSION INTRAVENOUS at 10:03

## 2019-08-01 RX ADMIN — MIDAZOLAM HYDROCHLORIDE 2 MG: 1 INJECTION, SOLUTION INTRAMUSCULAR; INTRAVENOUS at 09:58

## 2019-08-01 RX ADMIN — DEXAMETHASONE SODIUM PHOSPHATE 10 MG: 10 INJECTION INTRAMUSCULAR; INTRAVENOUS at 10:12

## 2019-08-01 ASSESSMENT — PULMONARY FUNCTION TESTS
PIF_VALUE: 68
PIF_VALUE: 14
PIF_VALUE: 13
PIF_VALUE: 14
PIF_VALUE: 13
PIF_VALUE: 13
PIF_VALUE: 17
PIF_VALUE: 13
PIF_VALUE: 13
PIF_VALUE: 70
PIF_VALUE: 18
PIF_VALUE: 13
PIF_VALUE: 14
PIF_VALUE: 17
PIF_VALUE: 1
PIF_VALUE: 13
PIF_VALUE: 19
PIF_VALUE: 19
PIF_VALUE: 0
PIF_VALUE: 23
PIF_VALUE: 13
PIF_VALUE: 1
PIF_VALUE: 13
PIF_VALUE: 19
PIF_VALUE: 19
PIF_VALUE: 24
PIF_VALUE: 18
PIF_VALUE: 17
PIF_VALUE: 10
PIF_VALUE: 13
PIF_VALUE: 19
PIF_VALUE: 13
PIF_VALUE: 1
PIF_VALUE: 13
PIF_VALUE: 0
PIF_VALUE: 18
PIF_VALUE: 13
PIF_VALUE: 18
PIF_VALUE: 3
PIF_VALUE: 14
PIF_VALUE: 20
PIF_VALUE: 13
PIF_VALUE: 13
PIF_VALUE: 14
PIF_VALUE: 18
PIF_VALUE: 19
PIF_VALUE: 13
PIF_VALUE: 18
PIF_VALUE: 18
PIF_VALUE: 17

## 2019-08-01 ASSESSMENT — PAIN - FUNCTIONAL ASSESSMENT: PAIN_FUNCTIONAL_ASSESSMENT: 0-10

## 2019-08-01 ASSESSMENT — PAIN DESCRIPTION - DESCRIPTORS: DESCRIPTORS: DISCOMFORT

## 2019-08-01 ASSESSMENT — LIFESTYLE VARIABLES: SMOKING_STATUS: 1

## 2019-08-01 ASSESSMENT — PAIN SCALES - GENERAL
PAINLEVEL_OUTOF10: 6
PAINLEVEL_OUTOF10: 9

## 2019-08-01 NOTE — ANESTHESIA PRE PROCEDURE
Department of Anesthesiology  Preprocedure Note       Name:  Renee Ortiz   Age:  53 y.o.  :  1975                                          MRN:  [id number]         Date:  2019      Surgeon: Tomi Inman):  Avis Yost MD    Procedure: SEPTOPLASTY, MICRODEBRIDER ASSISTED TURBINOPLASTY AND OUT FRACTURING BILATERAL NASAL ENDOSCOPY (N/A )    Medications prior to admission:   Prior to Admission medications    Medication Sig Start Date End Date Taking? Authorizing Provider   cephALEXin (KEFLEX) 250 MG capsule Take 1 capsule by mouth 3 times daily for 10 days 19 Yes Avis Yost MD   HYDROcodone-acetaminophen (NORCO) 5-325 MG per tablet Take 1 tablet by mouth every 4 hours as needed for Pain for up to 7 days.  19 Yes Avis Yost MD   varenicline (CHANTIX CONTINUING MONTH LEWIS) 1 MG tablet Take 1 tablet by mouth 2 times daily 19  Yes Ilsa Barnett MD   varenicline (CHANTIX STARTING MONTH LEWIS) 0.5 MG X 11 & 1 MG X 42 tablet As directed 19  Yes Ilsa Barnett MD   varenicline (CHANTIX LEWIS) 0.5 MG X 11 & 1 MG X 42 tablet Take by mouth. 3/29/19  Yes Yamini Craven MD       Current medications:    Current Facility-Administered Medications   Medication Dose Route Frequency Provider Last Rate Last Dose    lactated ringers infusion   Intravenous Continuous Robbie Johnson APRN -  mL/hr at 19 0832      lidocaine PF 1 % injection 1 mL  1 mL Intradermal Once PRN Robbie Johnson APRN - CNP        sodium chloride flush 0.9 % injection 10 mL  10 mL Intravenous 2 times per day Robbie Johnson APRN - CNP        sodium chloride flush 0.9 % injection 10 mL  10 mL Intravenous PRN Robbie Johnson APRN - CNP        fentaNYL (SUBLIMAZE) injection 50 mcg  50 mcg Intravenous Q10 Min PRN Shagufta Maradiaga MD   50 mcg at 19 1118    HYDROmorphone (DILAUDID) injection 0.5 mg  0.5 mg Intravenous Q10 Min PRN Shagufta Maradiaga MD

## 2019-08-01 NOTE — ANESTHESIA PRE PROCEDURE
 diphenhydrAMINE (BENADRYL) injection 12.5 mg  12.5 mg Intravenous Once PRN Maryjo Castorena MD        ondansetron [facility]) injection 4 mg  4 mg Intravenous Once PRN Maryjo Castorena MD        metoclopramide [facility]) injection 10 mg  10 mg Intravenous Once PRN Maryjo Castorena MD        meperidine (DEMEROL) injection 12.5 mg  12.5 mg Intravenous Q5 Min PRN Maryjo Castorena MD        lactated ringers infusion   Intravenous Continuous Maryjo Castorena MD        sodium chloride flush 0.9 % injection 10 mL  10 mL Intravenous 2 times per day Maryjo Castorena MD        sodium chloride flush 0.9 % injection 10 mL  10 mL Intravenous PRN Maryjo Castorena MD        lidocaine PF 1 % injection 1 mL  1 mL Intradermal Once PRN Maryjo Castorena MD           Allergies:  No Known Allergies    Problem List:    Patient Active Problem List   Diagnosis Code    Anxiety F41.9    Chronic back pain M54.9, G89.29    ED (erectile dysfunction) of organic origin N52.9    Lumbar and sacral osteoarthritis M47.817    Heroin abuse ([facility] [address] F11.10    Chronic rhinitis J31.0    Hypertrophy of nasal turbinates J34.3    Refractory obstruction of nasal airway J34.89    DNS (deviated nasal septum) J34.2    Smoker F17.200       Past Medical History:        Diagnosis Date    Anxiety     Chronic back pain     Drug abuse ([address]     pt states he has been clean since March 27,2017. Heroin abuse         Past Surgical History:        Procedure Laterality Date    VASECTOMY         Social History:    Social History     Tobacco Use    Smoking status: Current Every Day Smoker     Packs/day: 0.50     Years: 27.00     Pack years: 13.50     Types: Cigarettes    Smokeless tobacco: Never Used   Substance Use Topics    Alcohol use: No     Comment: quit 2012                                Ready to quit: Not Answered  Counseling given: Not Answered      Vital Signs (Current):  There were no vitals filed for this visit. BP Readings from Last 3 Encounters:   07/25/19 121/71   06/11/19 110/66   04/16/19 132/68       NPO Status:                                                                                 BMI:   Wt Readings from Last 3 Encounters:   07/25/19 229 lb (103.9 kg)   06/11/19 230 lb 3.2 oz (104.4 kg)   04/16/19 225 lb (102.1 kg)     There is no height or weight on file to calculate BMI.    CBC:   Lab Results   Component Value Date    WBC 6.5 07/25/2019    RBC 4.57 07/25/2019    HGB 14.8 07/25/2019    HCT 42.2 07/25/2019    MCV 92.3 07/25/2019    RDW 13.2 07/25/2019     07/25/2019       CMP:   Lab Results   Component Value Date     07/25/2019    K 4.0 07/25/2019     07/25/2019    CO2 25 07/25/2019    BUN 16 07/25/2019    CREATININE 1.09 07/25/2019    GFRAA >60.0 07/25/2019    LABGLOM >60.0 07/25/2019    GLUCOSE 124 07/25/2019    PROT 6.5 01/29/2019    CALCIUM 9.3 07/25/2019    BILITOT <0.2 01/29/2019    ALKPHOS 82 01/29/2019    AST 16 01/29/2019    ALT 14 01/29/2019       POC Tests: No results for input(s): POCGLU, POCNA, POCK, POCCL, POCBUN, POCHEMO, POCHCT in the last 72 hours.     Coags: No results found for: PROTIME, INR, APTT    HCG (If Applicable): No results found for: PREGTESTUR, PREGSERUM, HCG, HCGQUANT     ABGs: No results found for: PHART, PO2ART, SRE7ONM, HUS2BOE, BEART, R5LAMLKF     Type & Screen (If Applicable):  No results found for: LABABO, [address]    Anesthesia Evaluation  Patient summary reviewed and Nursing notes reviewed no history of anesthetic complications:   Airway: Mallampati: II  TM distance: >3 FB   Neck ROM: full  Mouth opening: > = 3 FB Dental: normal exam         Pulmonary:Negative Pulmonary ROS and normal exam    (+) sleep apnea:  current smoker                           Cardiovascular:Negative CV ROS                      Neuro/Psych:   Negative Neuro/Psych ROS              GI/Hepatic/Renal: Neg GI/Hepatic/Renal ROS

## 2019-09-03 ENCOUNTER — OFFICE VISIT (OUTPATIENT)
Dept: UROLOGY | Age: 44
End: 2019-09-03
Payer: COMMERCIAL

## 2019-09-03 VITALS
SYSTOLIC BLOOD PRESSURE: 132 MMHG | BODY MASS INDEX: 27.73 KG/M2 | WEIGHT: 223 LBS | HEIGHT: 75 IN | DIASTOLIC BLOOD PRESSURE: 82 MMHG | HEART RATE: 73 BPM

## 2019-09-03 DIAGNOSIS — N52.9 ERECTILE DYSFUNCTION, UNSPECIFIED ERECTILE DYSFUNCTION TYPE: Primary | ICD-10-CM

## 2019-09-03 PROCEDURE — G8427 DOCREV CUR MEDS BY ELIG CLIN: HCPCS | Performed by: UROLOGY

## 2019-09-03 PROCEDURE — 4004F PT TOBACCO SCREEN RCVD TLK: CPT | Performed by: UROLOGY

## 2019-09-03 PROCEDURE — 99214 OFFICE O/P EST MOD 30 MIN: CPT | Performed by: UROLOGY

## 2019-09-03 PROCEDURE — G8417 CALC BMI ABV UP PARAM F/U: HCPCS | Performed by: UROLOGY

## 2019-09-03 RX ORDER — SILDENAFIL 100 MG/1
100 TABLET, FILM COATED ORAL PRN
Qty: 10 TABLET | Refills: 3 | Status: SHIPPED | OUTPATIENT
Start: 2019-09-03 | End: 2020-05-21

## 2019-09-03 ASSESSMENT — ENCOUNTER SYMPTOMS: ABDOMINAL PAIN: 0
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
## 2019-12-10 DIAGNOSIS — F41.9 ANXIETY: ICD-10-CM

## 2019-12-10 DIAGNOSIS — Z72.0 TOBACCO ABUSE: Primary | ICD-10-CM

## 2019-12-10 RX ORDER — VARENICLINE TARTRATE 1 MG/1
1 TABLET, FILM COATED ORAL 2 TIMES DAILY
Qty: 60 TABLET | Refills: 4 | Status: SHIPPED | OUTPATIENT
Start: 2019-12-10 | End: 2021-01-06 | Stop reason: SDUPTHER

## 2019-12-10 RX ORDER — QUETIAPINE FUMARATE 100 MG/1
100 TABLET, FILM COATED ORAL 2 TIMES DAILY
Qty: 60 TABLET | Refills: 0 | Status: SHIPPED | OUTPATIENT
Start: 2019-12-10 | End: 2020-01-06

## 2019-12-18 ENCOUNTER — OFFICE VISIT (OUTPATIENT)
Dept: FAMILY MEDICINE CLINIC | Age: 44
End: 2019-12-18
Payer: COMMERCIAL

## 2019-12-18 VITALS
TEMPERATURE: 96.5 F | WEIGHT: 226 LBS | HEART RATE: 76 BPM | RESPIRATION RATE: 16 BRPM | BODY MASS INDEX: 28.1 KG/M2 | HEIGHT: 75 IN | OXYGEN SATURATION: 98 % | SYSTOLIC BLOOD PRESSURE: 126 MMHG | DIASTOLIC BLOOD PRESSURE: 74 MMHG

## 2019-12-18 DIAGNOSIS — R05.9 COUGH: ICD-10-CM

## 2019-12-18 DIAGNOSIS — R06.2 WHEEZE: ICD-10-CM

## 2019-12-18 DIAGNOSIS — J22 LOWER RESPIRATORY INFECTION (E.G., BRONCHITIS, PNEUMONIA, PNEUMONITIS, PULMONITIS): Primary | ICD-10-CM

## 2019-12-18 PROCEDURE — 94640 AIRWAY INHALATION TREATMENT: CPT | Performed by: NURSE PRACTITIONER

## 2019-12-18 PROCEDURE — 99214 OFFICE O/P EST MOD 30 MIN: CPT | Performed by: NURSE PRACTITIONER

## 2019-12-18 RX ORDER — IPRATROPIUM BROMIDE AND ALBUTEROL SULFATE 2.5; .5 MG/3ML; MG/3ML
1 SOLUTION RESPIRATORY (INHALATION) ONCE
Status: COMPLETED | OUTPATIENT
Start: 2019-12-18 | End: 2019-12-18

## 2019-12-18 RX ORDER — ALBUTEROL SULFATE 90 UG/1
2 AEROSOL, METERED RESPIRATORY (INHALATION) EVERY 4 HOURS PRN
Qty: 1 INHALER | Refills: 1 | Status: SHIPPED | OUTPATIENT
Start: 2019-12-18 | End: 2021-01-06

## 2019-12-18 RX ORDER — BENZONATATE 100 MG/1
200 CAPSULE ORAL 3 TIMES DAILY PRN
Qty: 20 CAPSULE | Refills: 0 | Status: SHIPPED | OUTPATIENT
Start: 2019-12-18 | End: 2019-12-23

## 2019-12-18 RX ORDER — PREDNISONE 20 MG/1
40 TABLET ORAL DAILY
Qty: 8 TABLET | Refills: 0 | Status: SHIPPED | OUTPATIENT
Start: 2019-12-18 | End: 2019-12-22

## 2019-12-18 RX ORDER — DOXYCYCLINE HYCLATE 100 MG
100 TABLET ORAL 2 TIMES DAILY
Qty: 20 TABLET | Refills: 0 | Status: SHIPPED | OUTPATIENT
Start: 2019-12-18 | End: 2019-12-28

## 2019-12-18 RX ADMIN — IPRATROPIUM BROMIDE AND ALBUTEROL SULFATE 1 AMPULE: 2.5; .5 SOLUTION RESPIRATORY (INHALATION) at 18:17

## 2019-12-18 ASSESSMENT — ENCOUNTER SYMPTOMS
SORE THROAT: 1
SHORTNESS OF BREATH: 0
COLOR CHANGE: 0
WHEEZING: 1
CHEST TIGHTNESS: 1
COUGH: 1

## 2020-01-06 RX ORDER — QUETIAPINE FUMARATE 100 MG/1
TABLET, FILM COATED ORAL
Qty: 60 TABLET | Refills: 0 | Status: SHIPPED | OUTPATIENT
Start: 2020-01-06 | End: 2020-01-17 | Stop reason: SDUPTHER

## 2020-01-17 ENCOUNTER — OFFICE VISIT (OUTPATIENT)
Dept: FAMILY MEDICINE CLINIC | Age: 45
End: 2020-01-17
Payer: COMMERCIAL

## 2020-01-17 VITALS
OXYGEN SATURATION: 98 % | HEIGHT: 75 IN | HEART RATE: 62 BPM | DIASTOLIC BLOOD PRESSURE: 72 MMHG | BODY MASS INDEX: 28.77 KG/M2 | WEIGHT: 231.4 LBS | SYSTOLIC BLOOD PRESSURE: 134 MMHG | TEMPERATURE: 97.2 F

## 2020-01-17 PROCEDURE — 99213 OFFICE O/P EST LOW 20 MIN: CPT | Performed by: NURSE PRACTITIONER

## 2020-01-17 PROCEDURE — G8484 FLU IMMUNIZE NO ADMIN: HCPCS | Performed by: NURSE PRACTITIONER

## 2020-01-17 PROCEDURE — G8427 DOCREV CUR MEDS BY ELIG CLIN: HCPCS | Performed by: NURSE PRACTITIONER

## 2020-01-17 PROCEDURE — G8417 CALC BMI ABV UP PARAM F/U: HCPCS | Performed by: NURSE PRACTITIONER

## 2020-01-17 PROCEDURE — 4004F PT TOBACCO SCREEN RCVD TLK: CPT | Performed by: NURSE PRACTITIONER

## 2020-01-17 RX ORDER — FLUTICASONE PROPIONATE 50 MCG
2 SPRAY, SUSPENSION (ML) NASAL DAILY
Qty: 1 BOTTLE | Refills: 0 | Status: SHIPPED | OUTPATIENT
Start: 2020-01-17 | End: 2021-01-06 | Stop reason: SDUPTHER

## 2020-01-17 RX ORDER — QUETIAPINE FUMARATE 100 MG/1
TABLET, FILM COATED ORAL
Qty: 60 TABLET | Refills: 0 | Status: SHIPPED | OUTPATIENT
Start: 2020-01-17 | End: 2021-09-01 | Stop reason: SDUPTHER

## 2020-01-17 RX ORDER — AMOXICILLIN AND CLAVULANATE POTASSIUM 875; 125 MG/1; MG/1
1 TABLET, FILM COATED ORAL 2 TIMES DAILY
Qty: 20 TABLET | Refills: 0 | Status: SHIPPED | OUTPATIENT
Start: 2020-01-17 | End: 2020-01-27

## 2020-01-17 ASSESSMENT — PATIENT HEALTH QUESTIONNAIRE - PHQ9
SUM OF ALL RESPONSES TO PHQ QUESTIONS 1-9: 0
SUM OF ALL RESPONSES TO PHQ QUESTIONS 1-9: 0
1. LITTLE INTEREST OR PLEASURE IN DOING THINGS: 0
SUM OF ALL RESPONSES TO PHQ9 QUESTIONS 1 & 2: 0
2. FEELING DOWN, DEPRESSED OR HOPELESS: 0

## 2020-01-17 ASSESSMENT — ENCOUNTER SYMPTOMS
RHINORRHEA: 1
WHEEZING: 0
NAUSEA: 0
SORE THROAT: 0
COUGH: 1
VOMITING: 0

## 2020-01-17 NOTE — PATIENT INSTRUCTIONS
Patient Education        Learning About Anxiety Disorders  What are anxiety disorders? Anxiety disorders are a type of medical problem. They cause severe anxiety. When you feel anxious, you feel that something bad is about to happen. This feeling interferes with your life. These disorders include:  · Generalized anxiety disorder. You feel worried and stressed about many everyday events and activities. This goes on for several months and disrupts your life on most days. · Panic disorder. You have repeated panic attacks. A panic attack is a sudden, intense fear or anxiety. It may make you feel short of breath. Your heart may pound. · Social anxiety disorder. You feel very anxious about what you will say or do in front of people. For example, you may be scared to talk or eat in public. This problem affects your daily life. · Phobias. You are very scared of a specific object, situation, or activity. For example, you may fear spiders, high places, or small spaces. What are the symptoms? Generalized anxiety disorder  Symptoms may include:  · Feeling worried and stressed about many things almost every day. · Feeling tired or irritable. You may have a hard time concentrating. · Having headaches or muscle aches. · Having a hard time getting to sleep or staying asleep. Panic disorder  You may have repeated panic attacks when there is no reason for feeling afraid. You may change your daily activities because you worry that you will have another attack. Symptoms may include:  · Intense fear, terror, or anxiety. · Trouble breathing or very fast breathing. · Chest pain or tightness. · A heartbeat that races or is not regular. Social anxiety disorder  Symptoms may include:  · Fear about a social situation, such as eating in front of others or speaking in public. You may worry a lot. Or you may be afraid that something bad will happen. · Anxiety that can cause you to blush, sweat, and feel shaky.   · A heartbeat that is faster than normal.  · A hard time focusing. Phobias  Symptoms may include:  · More fear than most people of being around an object, being in a situation, or doing an activity. You might also be stressed about the chance of being around the thing you fear. · Worry about losing control, panicking, fainting, or having physical symptoms like a faster heartbeat when you are around the situation or object. How are these disorders treated? Anxiety disorders can be treated with medicines or counseling. A combination of both may be used. Medicines may include:  · Antidepressants. These may help your symptoms by keeping chemicals in your brain in balance. · Benzodiazepines. These may give you short-term relief of your symptoms. Some people use cognitive-behavioral therapy. A therapist helps you learn to change stressful or bad thoughts into helpful thoughts. Lead a healthy lifestyle  A healthy lifestyle may help you feel better. · Get at least 30 minutes of exercise on most days of the week. Walking is a good choice. · Eat a healthy diet. Include fruits, vegetables, lean proteins, and whole grains in your diet each day. · Try to go to bed at the same time every night. Try for 8 hours of sleep a night. · Find ways to manage stress. Try relaxation exercises. · Avoid alcohol and illegal drugs. Follow-up care is a key part of your treatment and safety. Be sure to make and go to all appointments, and call your doctor if you are having problems. It's also a good idea to know your test results and keep a list of the medicines you take. Where can you learn more? Go to https://ravi.[employer]. org and sign in to your [employer] account. Enter F783 in the [facility] box to learn more about \"Learning About Anxiety Disorders. \"     If you do not have an account, please click on the \"Sign Up Now\" link. Current as of: May 28, 2019  Content Version: 12.3  © 4124-1854 [employer].

## 2020-01-17 NOTE — PROGRESS NOTES
Subjective:      Patient ID: Janette Quick is a 53 y.o. male who presents today for:     Chief Complaint   Patient presents with    Anxiety     Patient presents today to follow up on anxiety.  Congestion     Patient c/o nasal congestion x 7 days. URI    This is a new problem. The current episode started in the past 7 days. The problem has been unchanged. There has been no fever. Associated symptoms include congestion, coughing and rhinorrhea. Pertinent negatives include no ear pain, nausea, sore throat, vomiting or wheezing. He has tried nothing for the symptoms. Appt with Dr. Filippo Best on Friday. Pt in today for f/u on anxiety. He reports that he takes seroquel for this. He has an appt with [facility] on February 4th and then will be following up with them. Pt reports he never got the seroquel from [facility]. He reports he was told that there were no refills. Wife did try to call [employer] while in the room and was on hold the entire time. Past Medical History:   Diagnosis Date    Anxiety     Chronic back pain     Drug abuse ([address]     pt states he has been clean since March 27,2017.   Heroin abuse       Past Surgical History:   Procedure Laterality Date    SEPTOPLASTY N/A 8/1/2019    SEPTOPLASTY, MICRODEBRIDER ASSISTED TURBINOPLASTY AND OUT FRACTURING BILATERAL NASAL ENDOSCOPY performed by Ping Arias MD at [address]       Family History   Problem Relation Age of Onset    No Known Problems Mother     Heart Disease Father         pacemaker    No Known Problems Sister     No Known Problems Brother     No Known Problems Son     No Known Problems Daughter      Social History     Socioeconomic History    Marital status:      Spouse name: Not on file    Number of children: Not on file    Years of education: Not on file    Highest education level: Not on file   Occupational History    Not on file   Social Needs    Financial resource strain: Not on file   [employer] [facility] [employer] insecurity:     Worry: Not on file     Inability: Not on file    Transportation needs:     Medical: Not on file     Non-medical: Not on file   Tobacco Use    Smoking status: Current Every Day Smoker     Packs/day: 0.50     Years: 27.00     Pack years: 13.50     Types: Cigarettes    Smokeless tobacco: Never Used   Substance and Sexual Activity    Alcohol use: No     Comment: quit 2012    Drug use: No     Comment: pt states he has been clean since March 2017    Sexual activity: Yes   Lifestyle    Physical activity:     Days per week: Not on file     Minutes per session: Not on file    Stress: Not on file   Relationships    Social connections:     Talks on phone: Not on file     Gets together: Not on file     Attends Hindu service: Not on file     Active member of club or organization: Not on file     Attends meetings of clubs or organizations: Not on file     Relationship status: Not on file    Intimate partner violence:     Fear of current or ex partner: Not on file     Emotionally abused: Not on file     Physically abused: Not on file     Forced sexual activity: Not on file   Other Topics Concern    Not on file   Social History Narrative    Not on file     Current Outpatient Medications on File Prior to Visit   Medication Sig Dispense Refill    QUEtiapine (SEROQUEL) 100 MG tablet TAKE 1 TABLET BY MOUTH TWICE A DAY 60 tablet 0    varenicline (CHANTIX CONTINUING MONTH LEWIS) 1 MG tablet Take 1 tablet by mouth 2 times daily 60 tablet 4    sildenafil (VIAGRA) 100 MG tablet Take 1 tablet by mouth as needed for Erectile Dysfunction 10 tablet 3    albuterol sulfate  (90 Base) MCG/ACT inhaler Inhale 2 puffs into the lungs every 4 hours as needed for Wheezing (Patient not taking: Reported on 1/17/2020) 1 Inhaler 1     No current facility-administered medications on file prior to visit. Allergies:  Patient has no known allergies.     Review of Systems   HENT: Positive for congestion and encounter. Orders Placed This Encounter   Medications    fluticasone (FLONASE) 50 MCG/ACT nasal spray     Si sprays by Nasal route daily     Dispense:  1 Bottle     Refill:  0    amoxicillin-clavulanate (AUGMENTIN) 875-125 MG per tablet     Sig: Take 1 tablet by mouth 2 times daily for 10 days     Dispense:  20 tablet     Refill:  0       Return if symptoms worsen or fail to improve. Encouraged increase in fluids and rest. Ibuprofen and tylenol as needed. Discussed otc comfort care. Anxiety- 1 month supply of seroquel and f/u with psych for next scheduled appt for further treatment. Reviewed with the patient: current clinicalstatus, medications, activities and diet. Side effects, adverse effects of the medication prescribedtoday, as well as treatment plan/ rationale and result expectations have been discussedwith the patient who expresses understanding and desires to proceed. Close follow upto evaluate treatment results and for coordination of care. I have reviewedthe patient's medical history in detail and updated the computerized patient record.     Shaun Pedraza, APRN - CNP

## 2020-01-20 ENCOUNTER — NURSE ONLY (OUTPATIENT)
Dept: FAMILY MEDICINE CLINIC | Age: 45
End: 2020-01-20
Payer: COMMERCIAL

## 2020-01-20 PROCEDURE — 90688 IIV4 VACCINE SPLT 0.5 ML IM: CPT | Performed by: FAMILY MEDICINE

## 2020-01-20 PROCEDURE — 90471 IMMUNIZATION ADMIN: CPT | Performed by: FAMILY MEDICINE

## 2020-01-21 NOTE — PROGRESS NOTES
Vaccine Information Sheet, \"Influenza - Inactivated\"  given to [employer], or parent/legal guardian of  [employer] and verbalized understanding. Patient responses:    Have you ever had a reaction to a flu vaccine? No  Are you able to eat eggs without adverse effects? Yes  Do you have any current illness? No  Have you ever had Guillian Procious Syndrome? No    Flu vaccine given per order. Please see immunization tab.

## 2020-02-11 ENCOUNTER — OFFICE VISIT (OUTPATIENT)
Dept: FAMILY MEDICINE CLINIC | Age: 45
End: 2020-02-11
Payer: COMMERCIAL

## 2020-02-11 VITALS
BODY MASS INDEX: 28.72 KG/M2 | SYSTOLIC BLOOD PRESSURE: 120 MMHG | TEMPERATURE: 98.7 F | WEIGHT: 231 LBS | HEIGHT: 75 IN | OXYGEN SATURATION: 97 % | DIASTOLIC BLOOD PRESSURE: 74 MMHG | RESPIRATION RATE: 16 BRPM | HEART RATE: 74 BPM

## 2020-02-11 PROCEDURE — G8427 DOCREV CUR MEDS BY ELIG CLIN: HCPCS | Performed by: FAMILY MEDICINE

## 2020-02-11 PROCEDURE — G8482 FLU IMMUNIZE ORDER/ADMIN: HCPCS | Performed by: FAMILY MEDICINE

## 2020-02-11 PROCEDURE — 4004F PT TOBACCO SCREEN RCVD TLK: CPT | Performed by: FAMILY MEDICINE

## 2020-02-11 PROCEDURE — G8417 CALC BMI ABV UP PARAM F/U: HCPCS | Performed by: FAMILY MEDICINE

## 2020-02-11 PROCEDURE — 99213 OFFICE O/P EST LOW 20 MIN: CPT | Performed by: FAMILY MEDICINE

## 2020-02-11 ASSESSMENT — ENCOUNTER SYMPTOMS: ABDOMINAL PAIN: 0
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
## 2020-02-14 LAB
CULTURE, RESPIRATORY: ABNORMAL
GRAM STAIN RESULT: ABNORMAL
ORGANISM: ABNORMAL

## 2020-03-14 LAB
FINAL REPORT: NORMAL
PRELIMINARY: NORMAL

## 2020-05-21 RX ORDER — SILDENAFIL 100 MG/1
TABLET, FILM COATED ORAL
Qty: 30 TABLET | Refills: 1 | Status: SHIPPED | OUTPATIENT
Start: 2020-05-21 | End: 2021-01-06 | Stop reason: SDUPTHER

## 2020-06-17 ENCOUNTER — OFFICE VISIT (OUTPATIENT)
Dept: NEUROLOGY | Age: 45
End: 2020-06-17
Payer: COMMERCIAL

## 2020-06-17 VITALS — SYSTOLIC BLOOD PRESSURE: 133 MMHG | BODY MASS INDEX: 27.2 KG/M2 | DIASTOLIC BLOOD PRESSURE: 78 MMHG | WEIGHT: 217.6 LBS

## 2020-06-17 PROBLEM — K08.89 TOOTHACHE: Status: ACTIVE | Noted: 2020-06-17

## 2020-06-17 PROCEDURE — 99204 OFFICE O/P NEW MOD 45 MIN: CPT | Performed by: NURSE PRACTITIONER

## 2020-06-17 PROCEDURE — G8417 CALC BMI ABV UP PARAM F/U: HCPCS | Performed by: NURSE PRACTITIONER

## 2020-06-17 PROCEDURE — G8427 DOCREV CUR MEDS BY ELIG CLIN: HCPCS | Performed by: NURSE PRACTITIONER

## 2020-06-17 PROCEDURE — 4004F PT TOBACCO SCREEN RCVD TLK: CPT | Performed by: NURSE PRACTITIONER

## 2020-06-17 RX ORDER — SENNOSIDES 8.6 MG
CAPSULE ORAL
COMMUNITY
Start: 2020-04-21 | End: 2021-01-06

## 2020-06-17 RX ORDER — DEXTRAN 70, GLYCERIN, HYPROMELLOSE 1; 2; 3 MG/ML; MG/ML; MG/ML
SOLUTION/ DROPS OPHTHALMIC
COMMUNITY
Start: 2020-06-07 | End: 2021-09-01 | Stop reason: SDUPTHER

## 2020-06-17 RX ORDER — TIZANIDINE 4 MG/1
2 TABLET ORAL 2 TIMES DAILY PRN
Qty: 20 TABLET | Refills: 0 | Status: SHIPPED | OUTPATIENT
Start: 2020-06-17 | End: 2020-06-27

## 2020-06-17 RX ORDER — LIDOCAINE HYDROCHLORIDE 20 MG/ML
SOLUTION OROPHARYNGEAL
COMMUNITY
Start: 2020-04-25 | End: 2021-01-06

## 2020-06-17 ASSESSMENT — ENCOUNTER SYMPTOMS
NAUSEA: 0
DIARRHEA: 0
TROUBLE SWALLOWING: 0
VOMITING: 0
WHEEZING: 0
CHEST TIGHTNESS: 0
COUGH: 0
COLOR CHANGE: 0
SHORTNESS OF BREATH: 0
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
## 2020-07-06 ENCOUNTER — HOSPITAL ENCOUNTER (OUTPATIENT)
Dept: PHYSICAL THERAPY | Age: 45
Setting detail: THERAPIES SERIES
Discharge: HOME | End: 2020-07-06
Payer: COMMERCIAL

## 2020-07-06 PROCEDURE — 97162 PT EVAL MOD COMPLEX 30 MIN: CPT

## 2020-07-06 PROCEDURE — 97110 THERAPEUTIC EXERCISES: CPT

## 2020-07-06 ASSESSMENT — PAIN DESCRIPTION - DESCRIPTORS: DESCRIPTORS: TIGHTNESS

## 2020-07-06 ASSESSMENT — PAIN DESCRIPTION - LOCATION: LOCATION: NECK

## 2020-07-06 ASSESSMENT — PAIN DESCRIPTION - PAIN TYPE: TYPE: CHRONIC PAIN

## 2020-07-06 ASSESSMENT — PAIN SCALES - GENERAL: PAINLEVEL_OUTOF10: 8

## 2020-07-06 NOTE — PROGRESS NOTES
[address], [address]     Ph: [phone]  Fax: [phone]    [] Certification  [] Recertification [x]  Plan of Care  [] Progress Note [] Discharge      To: ERIC Delgado      From:  Alessandra Richardson, PT  Patient: Steve Monson     : 1975  Diagnosis: Chronic Tension-type headache, intractable     Date: 2020  Treatment Diagnosis: Neck pain, decreased posture      Progress Report Period from:  2020  to 2020    Total # of Visits to Date: 1   No Show: 0    Canceled Appointment: 0     OBJECTIVE:   Long Term Goals - Time Frame for Long term goals : 4-6 weeks  Goals Current/ Discharge status Met   Long term goal 1: Pt will be independent in completion of HEP for daily ROM, strength, and symptom mgmt. HEP initiated; to include ROM, strength, postural awareness, and symptom mgmt techniques. [] yes  [x] no   Long term goal 2: Pt will report at least 50% decrease in pain symptoms. Pain Location: Neck    Pain Level: 8    Pain Descriptors: Tightness(tension \"like a vice\")     [] yes  [x] no   Long term goal 3: Pt will display improved postural awareness requiring <25% cues to self correct sitting posture. Decreased cervical lordosis; mild fwd head and rounded shoulders. Verbal/visual cues to correct. [] yes  [x] no   Long term goal 4: Pt will report at least 75% function via NDI. Exam: NDI: 22/50 or 56% Function   [] yes  [x] no       Body structures, Functions, Activity limitations: Increased pain, Decreased ROM, Decreased posture  Assessment: Pt is a 49 yo male referred for PT eval with noted neck pain and headaches of chronic nature. Upon eval, pt reports constant neck pain of 8/10 intensity while displaying decreased cervical lordosis, tenderness throughout his cervical paraspinals and suboccipital musculature, and decreased postural awareness.  Pt can benefit from further PT intervention to address his noted deficits to facilitate pain mgmt and return to his highest level of function. Pt currently reports 56% function via the NDI with most difficulty noted sleeping secondary to pain/headache symptoms. Discharge Recommendations: Continue to assess pending progress      PT Education: PT Role;Plan of Care;Goals; Home Exercise Program  Patient Education: Discussed facilitating cervical lordosis with towel roll and using heat/CP for pain mgmt PRN    PLAN: [x] Evaluate and Treat  Frequency/Duration:  Plan  Times per week: 1  Plan weeks: 4-6  Current Treatment Recommendations: ROM, Strengthening, Manual Therapy - Joint Manipulation, Manual Therapy - Soft Tissue Mobilization, Neuromuscular Re-education, Pain Management, Modalities, Patient/Caregiver Education & Training, Home Exercise Program     Precautions:  n/a                          Patient Status:[x] Continue/ Initiate plan of Care    [] Discharge PT. Recommend pt continue with HEP. [] Additional visits requested, Please re-certify for additional visits:          Signature: Electronically signed by Anaid Sosa PT on 7/6/20 at 5:53 PM EDT      If you have any questions or concerns, please don't hesitate to call. Thank you for your referral.    I have reviewed this plan of care and certify a need for medically necessary rehabilitation services.     Physician Signature:__________________________________________________________  Date:  Please sign and return
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
## 2020-07-20 ENCOUNTER — HOSPITAL ENCOUNTER (OUTPATIENT)
Dept: PHYSICAL THERAPY | Age: 45
Setting detail: THERAPIES SERIES
Discharge: HOME | End: 2020-07-20
Payer: COMMERCIAL

## 2020-07-20 PROCEDURE — 97110 THERAPEUTIC EXERCISES: CPT

## 2020-07-20 PROCEDURE — 97140 MANUAL THERAPY 1/> REGIONS: CPT

## 2020-07-20 ASSESSMENT — PAIN SCALES - GENERAL: PAINLEVEL_OUTOF10: 9

## 2020-07-20 ASSESSMENT — PAIN DESCRIPTION - LOCATION: LOCATION: NECK

## 2020-07-20 ASSESSMENT — PAIN DESCRIPTION - ORIENTATION: ORIENTATION: MID

## 2020-07-20 ASSESSMENT — PAIN DESCRIPTION - DESCRIPTORS: DESCRIPTORS: TIGHTNESS

## 2020-07-20 ASSESSMENT — PAIN DESCRIPTION - PAIN TYPE: TYPE: CHRONIC PAIN

## 2020-07-20 NOTE — PROGRESS NOTES
[address] [address]  Outpatient Physical Therapy    Treatment Note        Date: 2020  Patient: Filomena Medeiros  : 1975  ACCT #: [de-identified]  Referring Practitioner: ERIC Mukherjee  Diagnosis: Chronic Tension-type headache, intractable    Visit Information:  PT Visit Information  Onset Date: 19  PT Insurance Information: Ken[employer]  Total # of Visits Approved: 31  Total # of Visits to Date: 2  No Show: 1  Canceled Appointment: 0  Progress Note Counter: -8    Subjective: Pt reports pain is 8-9/10 and is always there. Pt reports poor compliance w/ HEP. HEP Compliance:  [] Good [] Fair [x] Poor [] Reports not doing due to:    Vital Signs  Patient Currently in Pain: Yes   Pain Screening  Patient Currently in Pain: Yes  Pain Assessment  Pain Assessment: 0-10  Pain Level: 9  Pain Type: Chronic pain  Pain Location: Neck  Pain Orientation: Mid  Pain Descriptors: Tightness    OBJECTIVE:   Exercises  Exercise 1: Cervical AROM 5''x 10 in all planes  Exercise 2: Cervical Retractions 5''x 10  Exercise 3: UT and LS stretches 3x30'' b/l w/ over pressure  Exercise 4: scapular retractions 5''x10  Exercise 7: GTB rows/lat pulls x10 ea - VC's for improved posture  Exercise 20: HEP: cont current, inc compliance    Strength: [x] NT  [] MMT completed:    ROM: [x] NT  [] ROM measurements:     Manual:   Manual therapy  Soft Tissue Mobalization: suboccipital release w/ and w/o gentle distraction x10 min    Modalities:   Pt declined MHP    *Indicates exercise, modality, or manual techniques to be initiated when appropriate    Assessment: Body structures, Functions, Activity limitations: Increased pain, Decreased ROM, Decreased posture  Assessment: initiated tx per POC w/ pt requiring constant VC'ing for postural awareness and assist in counting reps/holds for stretches for maximal benefit as pt often under-counting. Pt reporting gradual dec in pain throughout session.  Pt declining MHP post tx session. Treatment Diagnosis: Neck pain, decreased posture      Goals:  Long term goals  Time Frame for Long term goals : 4-6 weeks  Long term goal 1: Pt will be independent in completion of HEP for daily ROM, strength, and symptom mgmt. Long term goal 2: Pt will report at least 50% decrease in pain symptoms. Long term goal 3: Pt will display improved postural awareness requiring <25% cues to self correct sitting posture. Long term goal 4: Pt will report at least 75% function via NDI. Progress toward goals: improve postural awareness, dec pain    POST-PAIN       Pain Rating (0-10 pain scale):  4-5 /10   Location and pain description same as pre-treatment unless indicated. Action: [] NA   [x] Perform HEP  [] Meds as prescribed  [] Modalities as prescribed   [] Call Physician     Frequency/Duration:  Plan  Times per week: 1  Plan weeks: 4-6  Current Treatment Recommendations: ROM, Strengthening, Manual Therapy - Joint Manipulation, Manual Therapy - Soft Tissue Mobilization, Neuromuscular Re-education, Pain Management, Modalities, Patient/Caregiver Education & Training, Home Exercise Program     Pt to continue current HEP. See objective section for any therapeutic exercise changes, additions or modifications this date.     PT Individual Minutes  Time In: 2979  Time Out: 1701  Minutes: 38  Timed Code Treatment Minutes: 38 Minutes  Procedure Minutes:0     Timed Activity Minutes Units   Ther Ex 28 2   Manual  10 1       Signature:  Electronically signed by Paris Briscoe PTA on 7/20/20 at 4:24 PM EDT

## 2020-07-22 ENCOUNTER — HOSPITAL ENCOUNTER (OUTPATIENT)
Dept: PHYSICAL THERAPY | Age: 45
Setting detail: THERAPIES SERIES
Discharge: HOME | End: 2020-07-22
Payer: COMMERCIAL

## 2020-07-22 PROCEDURE — 97110 THERAPEUTIC EXERCISES: CPT

## 2020-07-22 PROCEDURE — 97140 MANUAL THERAPY 1/> REGIONS: CPT

## 2020-07-22 ASSESSMENT — PAIN SCALES - GENERAL: PAINLEVEL_OUTOF10: 8

## 2020-07-22 ASSESSMENT — PAIN DESCRIPTION - ORIENTATION: ORIENTATION: MID

## 2020-07-22 ASSESSMENT — PAIN DESCRIPTION - DESCRIPTORS: DESCRIPTORS: TIGHTNESS

## 2020-07-22 ASSESSMENT — PAIN DESCRIPTION - LOCATION: LOCATION: NECK

## 2020-07-22 NOTE — PROGRESS NOTES
[address] [address]  Outpatient Physical Therapy    Treatment Note        Date: 2020  Patient: India Pineda  : 1975  ACCT #: [de-identified]  Referring Practitioner: ERIC Rhodes  Diagnosis: Chronic Tension-type headache, intractable    Visit Information:  PT Visit Information  Onset Date: 19  PT Insurance Information: Ora Eugene  Total # of Visits Approved: 31  Total # of Visits to Date: 3  No Show: 1  Canceled Appointment: 0  Progress Note Counter: 3/6-    Subjective: Pt 8 min late for tx this date. Unable to accomodate for full tx time. Pt reports pain is constant 8/10. States not completing HEP. HEP Compliance:  [] Good [] Fair [x] Poor [x] Reports not doing due to: no reason    Vital Signs  Patient Currently in Pain: Yes   Pain Screening  Patient Currently in Pain: Yes  Pain Assessment  Pain Assessment: 0-10  Pain Level: 8  Pain Location: Neck  Pain Orientation: Mid  Pain Descriptors: Tightness    OBJECTIVE:   Exercises  Exercise 2: Cervical Retractions supine 5''x 10  Exercise 3: UT and LS stretches 3x30'' b/l w/ over pressure  Exercise 5: Chest pulls supine RTB IR/ER x15 ea  Exercise 7: GTB rows/lat pulls x15 ea - VC's for improved posture  Exercise 20: HEP: chest pulls    Manual:   Manual therapy  Soft Tissue Mobalization: suboccipital release w/ and w/o gentle distraction, cervical paraspinals, b/l SCM x10 min    *Indicates exercise, modality, or manual techniques to be initiated when appropriate    Assessment: Body structures, Functions, Activity limitations: Increased pain, Decreased ROM, Decreased posture  Assessment: Pt reports improved tolerance to supine cervical retraction vs sitting. States relief with manual with noted decreased mm tension. Pt educated on imporatance of HEP completion for progression. Pt verbalized understanding.   Treatment Diagnosis: Neck pain, decreased posture         Goals:  Long term goals  Time Frame for Long term goals : 4-6 weeks  Long term goal 1: Pt will be independent in completion of HEP for daily ROM, strength, and symptom mgmt. Long term goal 2: Pt will report at least 50% decrease in pain symptoms. Long term goal 3: Pt will display improved postural awareness requiring <25% cues to self correct sitting posture. Long term goal 4: Pt will report at least 75% function via NDI. Progress toward goals: Progressing towards all    POST-PAIN       Pain Rating (0-10 pain scale):   4/10   Location and pain description same as pre-treatment unless indicated. Action: [] NA   [x] Perform HEP  [] Meds as prescribed  [] Modalities as prescribed   [] Call Physician     Frequency/Duration:  Plan  Times per week: 1  Plan weeks: 4-6  Current Treatment Recommendations: ROM, Strengthening, Manual Therapy - Joint Manipulation, Manual Therapy - Soft Tissue Mobilization, Neuromuscular Re-education, Pain Management, Modalities, Patient/Caregiver Education & Training, Home Exercise Program     Pt to continue current HEP. See objective section for any therapeutic exercise changes, additions or modifications this date.          PT Individual Minutes  Time In: 2156  Time Out: 8099  Minutes: 30  Timed Code Treatment Minutes: 30 Minutes  Procedure Minutes: 0     Timed Activity Minutes Units   Ther Ex 20 1   Manual  10 1       Signature:  Electronically signed by Pankaj Ruffin PTA on 7/22/20 at 4:43 PM EDT

## 2020-08-21 ENCOUNTER — TELEPHONE (OUTPATIENT)
Dept: FAMILY MEDICINE CLINIC | Age: 45
End: 2020-08-21

## 2020-08-21 ENCOUNTER — VIRTUAL VISIT (OUTPATIENT)
Dept: FAMILY MEDICINE CLINIC | Age: 45
End: 2020-08-21
Payer: COMMERCIAL

## 2020-08-21 PROCEDURE — 87880 STREP A ASSAY W/OPTIC: CPT | Performed by: NURSE PRACTITIONER

## 2020-08-21 PROCEDURE — 99213 OFFICE O/P EST LOW 20 MIN: CPT | Performed by: NURSE PRACTITIONER

## 2020-08-21 PROCEDURE — G8428 CUR MEDS NOT DOCUMENT: HCPCS | Performed by: NURSE PRACTITIONER

## 2020-08-21 ASSESSMENT — ENCOUNTER SYMPTOMS
WHEEZING: 0
COUGH: 0
ABDOMINAL PAIN: 0
SINUS PAIN: 0
VOMITING: 0
TROUBLE SWALLOWING: 0
SHORTNESS OF BREATH: 0
NAUSEA: 0
RHINORRHEA: 1
SORE THROAT: 1
CHEST TIGHTNESS: 0
APNEA: 0
ABDOMINAL DISTENTION: 0
SINUS PRESSURE: 0
FACIAL SWELLING: 0
DIARRHEA: 0

## 2020-08-21 NOTE — PATIENT INSTRUCTIONS
Patient Education        Rapid Strep Test: About This Test  What is it? A rapid strep test checks the bacteria in your throat to see if strep is the cause of your sore throat. Why is this test done? It may be done so your doctor can find out right away whether you have strep throat. There is another test for strep, called a throat culture, but that test takes a few days to get the results. How can you prepare for the test?  You don't need to do anything before you have this test.  What happens during the test?  · You will be asked to tilt your head back and open your mouth as wide as possible. · Your doctor will press your tongue down with a flat stick (tongue depressor) and then examine your mouth and throat. · A clean cotton swab will be rubbed over the back of your throat, around your tonsils, and over any red areas or sores to collect a sample. How long does the test take? · The test takes less than a minute. · Results are available in 10 to 15 minutes. Follow-up care is a key part of your treatment and safety. Be sure to make and go to all appointments, and call your doctor if you are having problems. It's also a good idea to keep a list of the medicines you take. Ask your doctor when you can expect to have your test results. Where can you learn more? Go to https://[place].EndoSphere. org and sign in to your Quickoffice account. Enter B356 in the [facility] box to learn more about \"Rapid Strep Test: About This Test.\"     If you do not have an account, please click on the \"Sign Up Now\" link. Current as of: July 29, 2019               Content Version: 12.5  © 2888-8115 Healthwise, Incorporated. Care instructions adapted under license by [facility] ([facility]). If you have questions about a medical condition or this instruction, always ask your healthcare professional. [place] [address] any warranty or liability for your use of this information.          Patient Education        Sore Throat: Care Instructions  Your Care Instructions     Infection by bacteria or a virus causes most sore throats. Cigarette smoke, dry air, air pollution, allergies, and yelling can also cause a sore throat. Sore throats can be painful and annoying. Fortunately, most sore throats go away on their own. If you have a bacterial infection, your doctor may prescribe antibiotics. Follow-up care is a key part of your treatment and safety. Be sure to make and go to all appointments, and call your doctor if you are having problems. It's also a good idea to know your test results and keep a list of the medicines you take. How can you care for yourself at home? · If your doctor prescribed antibiotics, take them as directed. Do not stop taking them just because you feel better. You need to take the full course of antibiotics. · Gargle with warm salt water once an hour to help reduce swelling and relieve discomfort. Use 1 teaspoon of salt mixed in 1 cup of warm water. · Take an over-the-counter pain medicine, such as acetaminophen (Tylenol), ibuprofen (Advil, Motrin), or naproxen (Aleve). Read and follow all instructions on the label. · Be careful when taking over-the-counter cold or flu medicines and Tylenol at the same time. Many of these medicines have acetaminophen, which is Tylenol. Read the labels to make sure that you are not taking more than the recommended dose. Too much acetaminophen (Tylenol) can be harmful. · Drink plenty of fluids. Fluids may help soothe an irritated throat. Hot fluids, such as tea or soup, may help decrease throat pain. · Use over-the-counter throat lozenges to soothe pain. Regular cough drops or hard candy may also help. These should not be given to young children because of the risk of choking. · Do not smoke or allow others to smoke around you. If you need help quitting, talk to your doctor about stop-smoking programs and medicines.  These can increase your chances problems. It's also a good idea to know your test results and keep a list of the medicines you take. How can you care for yourself at home? · Get plenty of rest if you feel tired. · Take an over-the-counter pain medicine if needed, such as acetaminophen (Tylenol), ibuprofen (Advil, Motrin), or naproxen (Aleve). Read and follow all instructions on the label. · Be careful when taking over-the-counter cold or flu medicines and Tylenol at the same time. Many of these medicines have acetaminophen, which is Tylenol. Read the labels to make sure that you are not taking more than the recommended dose. Too much acetaminophen (Tylenol) can be harmful. · Drink plenty of fluids, enough so that your urine is light yellow or clear like water. If you have kidney, heart, or liver disease and have to limit fluids, talk with your doctor before you increase the amount of fluids you drink. · Stay home from work, school, and other public places while you have a fever. When should you call for help? [id number] anytime you think you may need emergency care. For example, call if:  · You have severe trouble breathing. · You passed out (lost consciousness). Call your doctor now or seek immediate medical care if:  · You seem to be getting much sicker. · You have a new or higher fever. · You have blood in your stools. · You have new belly pain, or your pain gets worse. · You have a new rash. Watch closely for changes in your health, and be sure to contact your doctor if:  · You start to get better and then get worse. · You do not get better as expected. Where can you learn more? Go to https://mLEDpebladimirNewLeaf Symbioticseb.FreeDrive. org and sign in to your Fugoo account. Enter X956 in the [place] box to learn more about \"Viral Infections: Care Instructions. \"     If you do not have an account, please click on the \"Sign Up Now\" link.   Current as of: February 11, 2020               Content Version: 12.5  © 5829-2314 Healthwise, Incorporated. Care instructions adapted under license by [facility]. If you have questions about a medical condition or this instruction, always ask your healthcare professional. [address] any warranty or liability for your use of this information.

## 2020-08-21 NOTE — TELEPHONE ENCOUNTER
Called Pablo Madrigal and advised him that his POCT rapid strep was negative and we will call him in a couple days with his throat culture results. Pt verbalized understanding.

## 2020-08-21 NOTE — PROGRESS NOTES
2020  Patient and provider completed a VV today using the [employer] link. Pt was outside in his car and provider was in her office. TELEHEALTH EVALUATION -- Audio/Visual (During  public health emergency)    HPI:    Patient presents virtually today with c/o concern for Covid after he reports body aches, sore throat and some nasal drainage (clear) that started yesterday. 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 Pt denies any chest pain, SOB, fever, chills, loss of sense of smell/taste, diarrhea or numbness, tingling to any extremities or chest congestion. Patient positive reports of sore throat and body aches at this time. Pt requests to be tested for strep today and the Covid. Pt has a HX of substance abuse, anxiety and chronic back pain. Lashon Nance (:  1975) has requested an audio/video evaluation for the following concern(s):     Patient concerned with his s/s as he does property management and he reports having sore throat, body aches that started yesterday and he states he is in and out of houses all day long and wants to be tested for Covid to rule it out. Review of Systems   Constitutional: Negative for activity change, appetite change, chills, fatigue and fever. HENT: Positive for congestion, postnasal drip, rhinorrhea and sore throat (pt reports his throat started to hurt yesterday). Negative for drooling, ear pain, facial swelling, mouth sores, sinus pressure, sinus pain, sneezing and trouble swallowing. Eyes: Negative for visual disturbance. Respiratory: Negative for apnea, cough, chest tightness, shortness of breath and wheezing. Cardiovascular: Negative for chest pain and palpitations. Gastrointestinal: Negative for abdominal distention, abdominal pain, diarrhea, nausea and vomiting. Endocrine: Negative for polydipsia, polyphagia and polyuria.    Genitourinary: Negative for quit 2012    Drug use: No     Comment: pt states he has been clean since March 2017        No Known Allergies,   Past Medical History:   Diagnosis Date    Anxiety     Chronic back pain     Drug abuse ([address]     pt states he has been clean since March 27,2017.   Heroin abuse     ,   Past Surgical History:   Procedure Laterality Date    SEPTOPLASTY N/A 8/1/2019    SEPTOPLASTY, MICRODEBRIDER ASSISTED TURBINOPLASTY AND OUT FRACTURING BILATERAL NASAL ENDOSCOPY performed by Adele Hooks MD at [place] [id number]     ,   Social History     Tobacco Use    Smoking status: Current Every Day Smoker     Packs/day: 0.50     Years: 27.00     Pack years: 13.50     Types: Cigarettes    Smokeless tobacco: Never Used   Substance Use Topics    Alcohol use: No     Comment: quit 2012    Drug use: No     Comment: pt states he has been clean since March 2017   ,   Family History   Problem Relation Age of Onset    No Known Problems Mother     Heart Disease Father         pacemaker    No Known Problems Sister     No Known Problems Brother     No Known Problems Son     No Known Problems Daughter    ,   Immunization History   Administered Date(s) Administered    Influenza, Quadv, IM, (6 mo and older Fluzone, Flulaval, Fluarix and 3 yrs and older Afluria) 01/20/2020    Tdap (Boostrix, Adacel) 01/24/2019       PHYSICAL EXAMINATION:  [ INSTRUCTIONS:  \"[x]\" Indicates a positive item  \"[]\" Indicates a negative item  -- DELETE ALL ITEMS NOT EXAMINED]  Vital Signs: (As obtained by patient/caregiver or practitioner observation)    Blood pressure-  Heart rate-    Respiratory rate-    Temperature-  97.6f Pulse oximetry-     Patient unable to provide me with any VS besides his temp today  Constitutional: [x] Appears well-developed and well-nourished [x] No apparent distress      [] Abnormal-   Mental status  [x] Alert and awake  [x] Oriented to person/place/time [x]Able to follow commands      Eyes:  EOM    [x]  Normal  [] Abnormal-  Sclera  [x]  Normal  [] Abnormal -         Discharge []  None visible  [] Abnormal -    HENT:   [x] Normocephalic, atraumatic. [] Abnormal   [] Mouth/Throat: Mucous membranes are moist.    Pt reports his throat is red but denies any swollen lymph nodes or tonsils at this time. Denies any white spots. External Ears [x] Normal  [] Abnormal-     Neck: [x] No visualized mass     Pulmonary/Chest: [x] Respiratory effort normal.  [x] No visualized signs of difficulty breathing or respiratory distress        [] Abnormal-   Pt shows no s/s of any acute distress at this time of visit. Musculoskeletal:   [x] Normal gait with no signs of ataxia         [x] Normal range of motion of neck        [] Abnormal-       Neurological:        [x] No Facial Asymmetry (Cranial nerve 7 motor function) (limited exam to video visit)          [x] No gaze palsy        [] Abnormal-         Skin:        [x] No significant exanthematous lesions or discoloration noted on facial skin         [] Abnormal-            Psychiatric:       [x] Normal Affect [x] No Hallucinations        [] Abnormal-     Other pertinent observable physical exam findings-     ASSESSMENT/PLAN:  1. Acute pharyngitis, unspecified etiology    - COVID-19 Ambulatory; Future  - Culture, Throat; Future  - POCT rapid strep A    2. Body aches    - COVID-19 Ambulatory; Future    3. Encounter for laboratory testing for COVID-19 virus    - COVID-19 Ambulatory; Future    Patient was advised today after he gets Covid tested to self quarantine until the test results are back. Pt advised to use warm salt water gargles to decrease the oral/throat bacteria. Pt advised to take tylenol as needed for the throat pain. Pt advised I will call with the POCT rapid strep test results today and will call with the results of his throat culture and Covid when they come back. Pt verbalized understanding of the plan.   Pt advised symptom management for tx at this time and advised if his his s/s persist/worsen to follow up with his PCP as he may need an ATB. Pt verbalized understanding. Return if symptoms worsen or fail to improve. Jovanna Sanchez is a 50 y.o. male being evaluated by a Virtual Visit (video visit) encounter to address concerns as mentioned above. A caregiver was present when appropriate. Due to this being a TeleHealth encounter ([facility]), evaluation of the following organ systems was limited: Vitals/Constitutional/EENT/Resp/CV/GI//MS/Neuro/Skin/Heme-Lymph-Imm. Pursuant to the emergency declaration under the [address] [place] [address], [address] [place] [address] authority and the [employer] Act, this Virtual Visit was conducted with patient's (and/or legal guardian's) consent, to reduce the patient's risk of exposure to COVID-19 and provide necessary medical care. The patient (and/or legal guardian) has also been advised to contact this office for worsening conditions or problems, and seek emergency medical treatment and/or call 911 if deemed necessary. Patient identification was verified at the start of the visit: Yes    Total time spent on this encounter: Not billed by time    Services were provided through a video synchronous discussion virtually to substitute for in-person clinic visit. Patient and provider were located at their individual homes. --PATRICIA Cat - CNP on 8/21/2020 at 4:05 PM    An electronic signature was used to authenticate this note.

## 2020-08-23 LAB
SARS-COV-2: NOT DETECTED
SOURCE: NORMAL

## 2020-08-24 ENCOUNTER — TELEPHONE (OUTPATIENT)
Dept: FAMILY MEDICINE CLINIC | Age: 45
End: 2020-08-24

## 2020-08-24 LAB
ORGANISM: ABNORMAL
THROAT CULTURE: ABNORMAL
THROAT CULTURE: ABNORMAL

## 2020-08-24 NOTE — TELEPHONE ENCOUNTER
Called and advised pt that his Covid cAme back NEGATIVE and his throat culture came back with yeast and I sent over a prescription for him to start taking today. Pt verbalized understanding.

## 2020-09-17 ENCOUNTER — OFFICE VISIT (OUTPATIENT)
Dept: NEUROLOGY | Age: 45
End: 2020-09-17
Payer: COMMERCIAL

## 2020-09-17 VITALS
HEART RATE: 75 BPM | SYSTOLIC BLOOD PRESSURE: 126 MMHG | WEIGHT: 218.6 LBS | DIASTOLIC BLOOD PRESSURE: 77 MMHG | BODY MASS INDEX: 27.32 KG/M2

## 2020-09-17 PROCEDURE — G8417 CALC BMI ABV UP PARAM F/U: HCPCS | Performed by: NURSE PRACTITIONER

## 2020-09-17 PROCEDURE — G8427 DOCREV CUR MEDS BY ELIG CLIN: HCPCS | Performed by: NURSE PRACTITIONER

## 2020-09-17 PROCEDURE — 4004F PT TOBACCO SCREEN RCVD TLK: CPT | Performed by: NURSE PRACTITIONER

## 2020-09-17 PROCEDURE — 99213 OFFICE O/P EST LOW 20 MIN: CPT | Performed by: NURSE PRACTITIONER

## 2020-09-17 RX ORDER — IBUPROFEN 800 MG/1
TABLET ORAL
COMMUNITY
Start: 2020-09-15 | End: 2022-02-14 | Stop reason: SDUPTHER

## 2020-09-17 RX ORDER — BUSPIRONE HYDROCHLORIDE 10 MG/1
TABLET ORAL
COMMUNITY
Start: 2020-08-13 | End: 2021-09-01 | Stop reason: SDUPTHER

## 2020-09-17 RX ORDER — OXYCODONE HYDROCHLORIDE AND ACETAMINOPHEN 5; 325 MG/1; MG/1
TABLET ORAL
COMMUNITY
Start: 2020-09-15 | End: 2021-01-06

## 2020-09-17 RX ORDER — KETOROLAC TROMETHAMINE 10 MG/1
10 TABLET, FILM COATED ORAL EVERY 12 HOURS PRN
Qty: 30 TABLET | Refills: 0 | Status: SHIPPED | OUTPATIENT
Start: 2020-09-17 | End: 2021-01-06

## 2020-09-17 RX ORDER — CLINDAMYCIN HYDROCHLORIDE 150 MG/1
CAPSULE ORAL
COMMUNITY
Start: 2020-09-09 | End: 2021-01-06

## 2020-09-17 ASSESSMENT — ENCOUNTER SYMPTOMS
CONSTIPATION: 0
PHOTOPHOBIA: 0
SHORTNESS OF BREATH: 0
VOMITING: 0
WHEEZING: 0
COLOR CHANGE: 0
NAUSEA: 0
TROUBLE SWALLOWING: 0
ABDOMINAL DISTENTION: 0
COUGH: 0
DIARRHEA: 0
CHEST TIGHTNESS: 0
ABDOMINAL PAIN: 0

## 2020-09-17 NOTE — PROGRESS NOTES
Non-medical: Not on file   Tobacco Use    Smoking status: Current Every Day Smoker     Packs/day: 0.50     Years: 27.00     Pack years: 13.50     Types: Cigarettes    Smokeless tobacco: Never Used   Substance and Sexual Activity    Alcohol use: No     Comment: quit 2012    Drug use: No     Comment: pt states he has been clean since March 2017    Sexual activity: Yes   Lifestyle    Physical activity     Days per week: Not on file     Minutes per session: Not on file    Stress: Not on file   Relationships    Social connections     Talks on phone: Not on file     Gets together: Not on file     Attends Anabaptist service: Not on file     Active member of club or organization: Not on file     Attends meetings of clubs or organizations: Not on file     Relationship status: Not on file    Intimate partner violence     Fear of current or ex partner: Not on file     Emotionally abused: Not on file     Physically abused: Not on file     Forced sexual activity: Not on file   Other Topics Concern    Not on file   Social History Narrative    Not on file     Family History   Problem Relation Age of Onset    No Known Problems Mother     Heart Disease Father         pacemaker    No Known Problems Sister     No Known Problems Brother     No Known Problems Son     No Known Problems Daughter      No Known Allergies  Current Outpatient Medications on File Prior to Visit   Medication Sig Dispense Refill    acetaminophen (TYLENOL) 650 MG extended release tablet TAKE 1 TABLET BY MOUTH EVERY 6 HOURS AS NEEDED FOR PAIN      Artificial Tear Solution (GENTEAL TEARS) 0.1-0.2-0.3 % SOLN INSTILL 1 DROP INTO AFFECTED EYE(S) EVERY 4 HOURS      sildenafil (VIAGRA) 100 MG tablet TAKE 1 TABLET BY MOUTH AS NEEDED FOR ERECTILE DYSFUNCTION (NO MORE THAN 1 TABLET IN 24 HOURS) 30 tablet 1    QUEtiapine (SEROQUEL) 100 MG tablet TAKE 1 TABLET BY MOUTH TWICE A DAY 60 tablet 0    busPIRone (BUSPAR) 10 MG tablet TAKE 1 TABLET BY MOUTH TWICE DAILY      clindamycin (CLEOCIN) 150 MG capsule TAKE 1 CAPSULE BY MOUTH EVERY 6 HOURS UNTIL GONE      ibuprofen (ADVIL;MOTRIN) 800 MG tablet TAKE 1 TABLET BY MOUTH EVERY 6 HOURS AS NEEDED FOR PAIN      oxyCODONE-acetaminophen (PERCOCET) 5-325 MG per tablet TAKE 1 TABLET BY MOUTH EVERY 6 HOURS AS NEEDED FOR PAIN      lidocaine viscous hcl (XYLOCAINE) 2 % SOLN solution TAKE 5 ML BY MOUTH EVERY 4 TO 6 HOURS AS NEEDED FOR PAIN. SWISH FOR 30 SECONDS AND SPIT      fluticasone (FLONASE) 50 MCG/ACT nasal spray 2 sprays by Nasal route daily (Patient not taking: Reported on 6/17/2020) 1 Bottle 0    albuterol sulfate  (90 Base) MCG/ACT inhaler Inhale 2 puffs into the lungs every 4 hours as needed for Wheezing (Patient not taking: Reported on 6/17/2020) 1 Inhaler 1    varenicline (CHANTIX CONTINUING MONTH LEWIS) 1 MG tablet Take 1 tablet by mouth 2 times daily (Patient not taking: Reported on 6/17/2020) 60 tablet 4     No current facility-administered medications on file prior to visit. Review of Systems   Constitutional: Negative for appetite change, chills, fatigue and fever. HENT: Negative for hearing loss and trouble swallowing. Eyes: Negative for photophobia and visual disturbance. Respiratory: Negative for cough, chest tightness, shortness of breath and wheezing. Cardiovascular: Negative for chest pain, palpitations and leg swelling. Gastrointestinal: Negative for abdominal distention, abdominal pain, constipation, diarrhea, nausea and vomiting. Musculoskeletal: Positive for neck pain. Negative for gait problem and neck stiffness. Skin: Negative for color change and rash. Neurological: Positive for headaches. Negative for dizziness, tremors, seizures, syncope, facial asymmetry, speech difficulty, weakness, light-headedness and numbness. Psychiatric/Behavioral: Negative for agitation, confusion and hallucinations. The patient is not nervous/anxious.         Objective:   BP 126/77 (Site: Right Upper Arm, Position: Sitting, Cuff Size: Medium Adult)   Pulse 75   Wt 218 lb 9.6 oz (99.2 kg)   BMI 27.32 kg/m²     Physical Exam  Vitals signs reviewed. Constitutional:       General: He is not in acute distress. Appearance: He is not diaphoretic. HENT:      Head: Normocephalic and atraumatic. Eyes:      General: No visual field deficit. Right eye: No discharge. Left eye: No discharge. Extraocular Movements: Extraocular movements intact. Conjunctiva/sclera: Conjunctivae normal.      Pupils: Pupils are equal, round, and reactive to light. Neck:      Musculoskeletal: Normal range of motion and neck supple. No neck rigidity. Cardiovascular:      Rate and Rhythm: Normal rate and regular rhythm. Pulmonary:      Effort: Pulmonary effort is normal. No respiratory distress. Breath sounds: Normal breath sounds. Abdominal:      General: Bowel sounds are normal. There is no distension. Palpations: Abdomen is soft. Tenderness: There is no abdominal tenderness. Lymphadenopathy:      Cervical: No cervical adenopathy. Skin:     General: Skin is warm and dry. Neurological:      General: No focal deficit present. Mental Status: He is alert and oriented to person, place, and time. Cranial Nerves: No cranial nerve deficit, dysarthria or facial asymmetry. Motor: No weakness, tremor, atrophy, abnormal muscle tone, seizure activity or pronator drift. Coordination: Romberg sign negative. Coordination normal.      Gait: Gait normal.      Deep Tendon Reflexes: Reflexes normal.      Reflex Scores:       Brachioradialis reflexes are 2+ on the right side and 2+ on the left side. Patellar reflexes are 2+ on the right side and 2+ on the left side. No results found.     Lab Results   Component Value Date    WBC 6.5 07/25/2019    RBC 4.57 07/25/2019    HGB 14.8 07/25/2019    HCT 42.2 07/25/2019    MCV 92.3 07/25/2019    MCH 32.3 07/25/2019    MCHC 35.0 07/25/2019    RDW 13.2 07/25/2019     07/25/2019     Lab Results   Component Value Date     07/25/2019    K 4.0 07/25/2019     07/25/2019    CO2 25 07/25/2019    BUN 16 07/25/2019    CREATININE 1.09 07/25/2019    GFRAA >60.0 07/25/2019    LABGLOM >60.0 07/25/2019    GLUCOSE 124 07/25/2019    PROT 6.5 01/29/2019    LABALBU 4.0 01/29/2019    CALCIUM 9.3 07/25/2019    BILITOT <0.2 01/29/2019    ALKPHOS 82 01/29/2019    AST 16 01/29/2019    ALT 14 01/29/2019     No results found for: PROTIME, INR  Lab Results   Component Value Date    TSH 1.540 04/23/2018    GNESFQYZ04 467 04/23/2018    FOLATE 12.1 02/11/2019     Lab Results   Component Value Date    TRIG 197 01/29/2019    HDL 48 01/29/2019    LDLCALC 94 01/29/2019     No results found for: Silvia Pollack, LABBENZ, CANNAB, COCAINESCRN, LABMETH, OPIATESCREENURINE, PHENCYCLIDINESCREENURINE, PPXUR, ETOH  No results found for: LITHIUM, DILFRTOT, VALPROATE    Assessment and Plan:      1. Chronic tension-type headache, not intractable  -Patient was a no-show for physical therapy 5 times therefore was discharged. He reports he had no response to Zanaflex. We offered to refer patient to [facility] headache clinic but he declined at this time. We offered preventative treatment for headaches but patient also declined at this time. We will give him a short course of anti-inflammatories to see if he has some response to this. Patient requesting to be referred back to physical therapy. We stressed importance of compliance. - ketorolac (TORADOL) 10 MG tablet; Take 1 tablet by mouth every 12 hours as needed for Pain  Dispense: 30 tablet;  Refill: 0 (received phone call from pharmacy after prescription sent and they stated they can only fill this for 5 days and will give patient 10 pills)  - [address] [place] [address] Road      Return in about 3 months (around 12/17/2020), or if symptoms worsen or fail to improve.     Flora Mcneil, APRN - CNP

## 2020-11-08 ENCOUNTER — OFFICE VISIT (OUTPATIENT)
Dept: FAMILY MEDICINE CLINIC | Age: 45
End: 2020-11-08
Payer: COMMERCIAL

## 2020-11-08 VITALS
DIASTOLIC BLOOD PRESSURE: 78 MMHG | WEIGHT: 220 LBS | HEART RATE: 71 BPM | OXYGEN SATURATION: 99 % | SYSTOLIC BLOOD PRESSURE: 120 MMHG | HEIGHT: 75 IN | BODY MASS INDEX: 27.35 KG/M2

## 2020-11-08 PROCEDURE — 99213 OFFICE O/P EST LOW 20 MIN: CPT | Performed by: PHYSICIAN ASSISTANT

## 2020-11-08 RX ORDER — VARENICLINE TARTRATE 1 MG/1
1 TABLET, FILM COATED ORAL 2 TIMES DAILY
Qty: 60 TABLET | Refills: 0 | Status: SHIPPED | OUTPATIENT
Start: 2020-11-08 | End: 2021-06-09

## 2020-11-08 RX ORDER — AMOXICILLIN AND CLAVULANATE POTASSIUM 875; 125 MG/1; MG/1
1 TABLET, FILM COATED ORAL 2 TIMES DAILY
Qty: 20 TABLET | Refills: 0 | Status: SHIPPED | OUTPATIENT
Start: 2020-11-08 | End: 2020-11-18

## 2020-11-08 ASSESSMENT — ENCOUNTER SYMPTOMS
EYE ITCHING: 0
COUGH: 0
DIARRHEA: 0
TROUBLE SWALLOWING: 0
SHORTNESS OF BREATH: 0
CHEST TIGHTNESS: 0
EYE PAIN: 0
EYE DISCHARGE: 0
RHINORRHEA: 1
SINUS PRESSURE: 1
BACK PAIN: 0
ABDOMINAL PAIN: 0
VOMITING: 0

## 2020-11-08 ASSESSMENT — PATIENT HEALTH QUESTIONNAIRE - PHQ9
SUM OF ALL RESPONSES TO PHQ9 QUESTIONS 1 & 2: 0
2. FEELING DOWN, DEPRESSED OR HOPELESS: NOT AT ALL
1. LITTLE INTEREST OR PLEASURE IN DOING THINGS: NOT AT ALL
DEPRESSION UNABLE TO ASSESS: YES

## 2020-11-08 NOTE — PROGRESS NOTES
Subjective:      Patient ID: Argelia Roman is a 50 y.o. male who presents today for:  Chief Complaint   Patient presents with    Sinusitis     runny nose        HPI  50year old male who presents with complains of a runny nose and maxillary sinus pressure. He denies fever and chills. No shortness of breath. Post nasal drip. Past Medical History:   Diagnosis Date    Anxiety     Chronic back pain     Drug abuse ([address]     pt states he has been clean since March 27,2017.   Heroin abuse       Past Surgical History:   Procedure Laterality Date    SEPTOPLASTY N/A 8/1/2019    SEPTOPLASTY, MICRODEBRIDER ASSISTED TURBINOPLASTY AND OUT FRACTURING BILATERAL NASAL ENDOSCOPY performed by Rashid Galvez MD at [address] History     Socioeconomic History    Marital status:      Spouse name: Not on file    Number of children: Not on file    Years of education: Not on file    Highest education level: Not on file   Occupational History    Not on file   Social Needs    Financial resource strain: Not on file    Food insecurity     Worry: Not on file     Inability: Not on file    Transportation needs     Medical: Not on file     Non-medical: Not on file   Tobacco Use    Smoking status: Current Every Day Smoker     Packs/day: 0.50     Years: 27.00     Pack years: 13.50     Types: Cigarettes    Smokeless tobacco: Never Used   Substance and Sexual Activity    Alcohol use: No     Comment: quit 2012    Drug use: No     Comment: pt states he has been clean since March 2017    Sexual activity: Yes   Lifestyle    Physical activity     Days per week: Not on file     Minutes per session: Not on file    Stress: Not on file   Relationships    Social connections     Talks on phone: Not on file     Gets together: Not on file     Attends Rastafari service: Not on file     Active member of club or organization: Not on file     Attends meetings of clubs or organizations: Not on file Relationship status: Not on file    Intimate partner violence     Fear of current or ex partner: Not on file     Emotionally abused: Not on file     Physically abused: Not on file     Forced sexual activity: Not on file   Other Topics Concern    Not on file   Social History Narrative    Not on file     Family History   Problem Relation Age of Onset    No Known Problems Mother     Heart Disease Father         pacemaker    No Known Problems Sister     No Known Problems Brother     No Known Problems Son     No Known Problems Daughter      No Known Allergies  Current Outpatient Medications   Medication Sig Dispense Refill    amoxicillin-clavulanate (AUGMENTIN) 875-125 MG per tablet Take 1 tablet by mouth 2 times daily for 10 days 20 tablet 0    varenicline (CHANTIX) 1 MG tablet Take 1 tablet by mouth 2 times daily 60 tablet 0    busPIRone (BUSPAR) 10 MG tablet TAKE 1 TABLET BY MOUTH TWICE DAILY      ibuprofen (ADVIL;MOTRIN) 800 MG tablet TAKE 1 TABLET BY MOUTH EVERY 6 HOURS AS NEEDED FOR PAIN      acetaminophen (TYLENOL) 650 MG extended release tablet TAKE 1 TABLET BY MOUTH EVERY 6 HOURS AS NEEDED FOR PAIN      clindamycin (CLEOCIN) 150 MG capsule TAKE 1 CAPSULE BY MOUTH EVERY 6 HOURS UNTIL GONE      oxyCODONE-acetaminophen (PERCOCET) 5-325 MG per tablet TAKE 1 TABLET BY MOUTH EVERY 6 HOURS AS NEEDED FOR PAIN      ketorolac (TORADOL) 10 MG tablet Take 1 tablet by mouth every 12 hours as needed for Pain (Patient not taking: Reported on 11/8/2020) 30 tablet 0    Artificial Tear Solution (GENTEAL TEARS) 0.1-0.2-0.3 % SOLN INSTILL 1 DROP INTO AFFECTED EYE(S) EVERY 4 HOURS      lidocaine viscous hcl (XYLOCAINE) 2 % SOLN solution TAKE 5 ML BY MOUTH EVERY 4 TO 6 HOURS AS NEEDED FOR PAIN.  SWISH FOR 30 SECONDS AND SPIT      sildenafil (VIAGRA) 100 MG tablet TAKE 1 TABLET BY MOUTH AS NEEDED FOR ERECTILE DYSFUNCTION (NO MORE THAN 1 TABLET IN 24 HOURS) (Patient not taking: Reported on 11/8/2020) 30 tablet 1    fluticasone (FLONASE) 50 MCG/ACT nasal spray 2 sprays by Nasal route daily (Patient not taking: Reported on 6/17/2020) 1 Bottle 0    QUEtiapine (SEROQUEL) 100 MG tablet TAKE 1 TABLET BY MOUTH TWICE A DAY (Patient not taking: Reported on 11/8/2020) 60 tablet 0    albuterol sulfate  (90 Base) MCG/ACT inhaler Inhale 2 puffs into the lungs every 4 hours as needed for Wheezing (Patient not taking: Reported on 6/17/2020) 1 Inhaler 1    varenicline (CHANTIX CONTINUING MONTH LEWIS) 1 MG tablet Take 1 tablet by mouth 2 times daily (Patient not taking: Reported on 6/17/2020) 60 tablet 4     No current facility-administered medications for this visit. Review of Systems   Constitutional: Negative for activity change, appetite change, chills, fever and unexpected weight change. HENT: Positive for postnasal drip, rhinorrhea and sinus pressure. Negative for drooling, ear pain, nosebleeds and trouble swallowing. Eyes: Negative for pain, discharge and itching. Respiratory: Negative for cough, chest tightness and shortness of breath. Cardiovascular: Negative for chest pain and leg swelling. Gastrointestinal: Negative for abdominal pain, diarrhea and vomiting. Endocrine: Negative for polydipsia and polyphagia. Genitourinary: Negative for dysuria, flank pain and frequency. Musculoskeletal: Negative for back pain and myalgias. Skin: Negative for pallor and rash. Neurological: Negative for syncope, weakness and headaches. Hematological: Does not bruise/bleed easily. Psychiatric/Behavioral: Negative for agitation, behavioral problems and confusion. All other systems reviewed and are negative. Objective:   /78 (Site: Left Upper Arm, Position: Sitting, Cuff Size: Medium Adult)   Pulse 71   Ht 6' 3\" (1.905 m)   Wt 220 lb (99.8 kg)   SpO2 99%   BMI 27.50 kg/m²     Physical Exam  Vitals signs and nursing note reviewed. Constitutional:       General: He is awake.  He is not in acute submental adenopathy. Skin:     General: Skin is warm and dry. Capillary Refill: Capillary refill takes less than 2 seconds. Coloration: Skin is not jaundiced or pale. Findings: No bruising, erythema, lesion or rash. Neurological:      General: No focal deficit present. Mental Status: He is alert and oriented to person, place, and time. Mental status is at baseline. Cranial Nerves: No cranial nerve deficit. Sensory: No sensory deficit. Motor: No weakness. Coordination: Coordination normal.      Deep Tendon Reflexes: Reflexes are normal and symmetric. Psychiatric:         Mood and Affect: Mood normal.         Behavior: Behavior normal. Behavior is cooperative. Thought Content: Thought content normal.         Judgment: Judgment normal.         Assessment:       Diagnosis Orders   1. Acute non-recurrent maxillary sinusitis     2. Tobacco abuse  varenicline (CHANTIX) 1 MG tablet     No results found for this visit on 11/08/20. Plan:     Assessment & Plan   Elias Vaughan was seen today for sinusitis. Diagnoses and all orders for this visit:    Acute non-recurrent maxillary sinusitis    Tobacco abuse  -     varenicline (CHANTIX) 1 MG tablet; Take 1 tablet by mouth 2 times daily    Other orders  -     amoxicillin-clavulanate (AUGMENTIN) 875-125 MG per tablet; Take 1 tablet by mouth 2 times daily for 10 days      No orders of the defined types were placed in this encounter. Orders Placed This Encounter   Medications    amoxicillin-clavulanate (AUGMENTIN) 875-125 MG per tablet     Sig: Take 1 tablet by mouth 2 times daily for 10 days     Dispense:  20 tablet     Refill:  0    varenicline (CHANTIX) 1 MG tablet     Sig: Take 1 tablet by mouth 2 times daily     Dispense:  60 tablet     Refill:  0     There are no discontinued medications. Return if symptoms worsen or fail to improve. Reviewed with the patient/family: current clinical status & medications.   Side

## 2020-11-17 ENCOUNTER — VIRTUAL VISIT (OUTPATIENT)
Dept: FAMILY MEDICINE CLINIC | Age: 45
End: 2020-11-17
Payer: COMMERCIAL

## 2020-11-17 ENCOUNTER — NURSE ONLY (OUTPATIENT)
Dept: FAMILY MEDICINE CLINIC | Age: 45
End: 2020-11-17

## 2020-11-17 DIAGNOSIS — Z20.822 EXPOSURE TO COVID-19 VIRUS: ICD-10-CM

## 2020-11-17 PROCEDURE — 99441 PR PHYS/QHP TELEPHONE EVALUATION 5-10 MIN: CPT | Performed by: PHYSICIAN ASSISTANT

## 2020-11-17 ASSESSMENT — ENCOUNTER SYMPTOMS
COUGH: 0
DIARRHEA: 0
BACK PAIN: 0
ABDOMINAL PAIN: 0
TROUBLE SWALLOWING: 0
CHEST TIGHTNESS: 0
VOMITING: 0
SHORTNESS OF BREATH: 0
SINUS PRESSURE: 0

## 2020-11-17 NOTE — PROGRESS NOTES
TELEHEALTH EVALUATION -- Audio/Visual (During IMAGU-40 public health emergency)    -   Rody Vergara is a 50 y.o. male being evaluated by a Virtual Visit (video visit) encounter to address concerns as mentioned above. A caregiver was present when appropriate. Due to this being a TeleHealth encounter (During LFMOD-81 public health emergency), evaluation of the following organ systems was limited: Vitals/Constitutional/EENT/Resp/CV/GI//MS/Neuro/Skin/Heme-Lymph-Imm. Pursuant to the emergency declaration under the [facility], [address] [place] [address] authority and the [employer] Act, this Virtual Visit was conducted with patient's (and/or legal guardian's) consent, to reduce the patient's risk of exposure to COVID-19 and provide necessary medical care. The patient (and/or legal guardian) has also been advised to contact this office for worsening conditions or problems, and seek emergency medical treatment and/or call 911 if deemed necessary. Patient was contacted and agreed to proceed with a virtual visit via Telephone Visit  The risks and benefits of converting to a virtual visit were discussed in light of the current infectious disease epidemic. Patient also understood that insurance coverage and co-pays are up to their individual insurance plans. Patient was located at their home. Provider was located at their office. 2020  Dania Nance (:  1975) has requested an audio/video evaluation for the following concern(s):    HPI 50year old male who's son's mother tested positive for COVID-19 yesterday. The children were going back and forth to her house. He has been around them multiple times since.  He complains of cough, rhinorrhea, chest congestion and fatigue       Review of Systems   Constitutional: Negative for activity change, appetite change, chills, fever and unexpected weight change. HENT: Negative for drooling, ear pain, nosebleeds, sinus pressure and trouble swallowing. Respiratory: Negative for cough, chest tightness and shortness of breath. Cardiovascular: Negative for chest pain and leg swelling. Gastrointestinal: Negative for abdominal pain, diarrhea and vomiting. Endocrine: Negative for polydipsia and polyphagia. Genitourinary: Negative for dysuria, flank pain and frequency. Musculoskeletal: Negative for back pain and myalgias. Skin: Negative for pallor and rash. Neurological: Negative for syncope, weakness and headaches. Hematological: Does not bruise/bleed easily. All other systems reviewed and are negative. Prior to Visit Medications    Medication Sig Taking?  Authorizing Provider   amoxicillin-clavulanate (AUGMENTIN) 875-125 MG per tablet Take 1 tablet by mouth 2 times daily for 10 days  Corrinne Macadamia, PA   varenicline (CHANTIX) 1 MG tablet Take 1 tablet by mouth 2 times daily  Corrinne Macadamia, PA   busPIRone (BUSPAR) 10 MG tablet TAKE 1 TABLET BY MOUTH TWICE DAILY  Historical Provider, MD   clindamycin (CLEOCIN) 150 MG capsule TAKE 1 CAPSULE BY MOUTH EVERY 6 HOURS UNTIL GONE  Historical Provider, MD   ibuprofen (ADVIL;MOTRIN) 800 MG tablet TAKE 1 TABLET BY MOUTH EVERY 6 HOURS AS NEEDED FOR PAIN  Historical Provider, MD   oxyCODONE-acetaminophen (PERCOCET) 5-325 MG per tablet TAKE 1 TABLET BY MOUTH EVERY 6 HOURS AS NEEDED FOR PAIN  Historical Provider, MD   ketorolac (TORADOL) 10 MG tablet Take 1 tablet by mouth every 12 hours as needed for Pain  Patient not taking: Reported on 11/8/2020  PATRICIA Swartz CNP   acetaminophen (TYLENOL) 650 MG extended release tablet TAKE 1 TABLET BY MOUTH EVERY 6 HOURS AS NEEDED FOR PAIN  Historical Provider, MD   Artificial Tear Solution (GENTEAL TEARS) 0.1-0.2-0.3 % SOLN INSTILL 1 DROP INTO AFFECTED EYE(S) EVERY 4 HOURS  Historical Provider, MD   lidocaine viscous hcl (XYLOCAINE) 2 % SOLN solution TAKE 5 ML BY MOUTH EVERY 4 TO 6 HOURS AS NEEDED FOR PAIN. SWISH FOR 30 SECONDS AND SPIT  Historical Provider, MD   sildenafil (VIAGRA) 100 MG tablet TAKE 1 TABLET BY MOUTH AS NEEDED FOR ERECTILE DYSFUNCTION (NO MORE THAN 1 TABLET IN 24 HOURS)  Patient not taking: Reported on 11/8/2020  Avinash Kan MD   fluticasone [facility]) 50 MCG/ACT nasal spray 2 sprays by Nasal route daily  Patient not taking: Reported on 6/17/2020  PATRICIA Polo CNP   QUEtiapine (SEROQUEL) 100 MG tablet TAKE 1 TABLET BY MOUTH TWICE A DAY  Patient not taking: Reported on 11/8/2020  PATRICIA Polo CNP   albuterol sulfate  (90 Base) MCG/ACT inhaler Inhale 2 puffs into the lungs every 4 hours as needed for Wheezing  Patient not taking: Reported on 6/17/2020  PATRICIA Gonzalez NP   varenicline (CHANTIX CONTINUING MONTH LEWIS) 1 MG tablet Take 1 tablet by mouth 2 times daily  Patient not taking: Reported on 6/17/2020  PATRICIA Polo CNP       Past Medical History:   Diagnosis Date    Anxiety     Chronic back pain     Drug abuse [facility])     pt states he has been clean since March 27,2017.   Heroin abuse       Past Surgical History:   Procedure Laterality Date    SEPTOPLASTY N/A 8/1/2019    SEPTOPLASTY, MICRODEBRIDER ASSISTED TURBINOPLASTY AND OUT FRACTURING BILATERAL NASAL ENDOSCOPY performed by Sharon Chinchilla MD at [address] History     Socioeconomic History    Marital status:      Spouse name: Not on file    Number of children: Not on file    Years of education: Not on file    Highest education level: Not on file   Occupational History    Not on file   Social Needs    Financial resource strain: Not on file    Food insecurity     Worry: Not on file     Inability: Not on file    Transportation needs     Medical: Not on file     Non-medical: Not on file   Tobacco Use    Smoking status: Current Every Day Smoker     Packs/day: 0.50 Years: 27.00     Pack years: 13.50     Types: Cigarettes    Smokeless tobacco: Never Used   Substance and Sexual Activity    Alcohol use: No     Comment: quit 2012    Drug use: No     Comment: pt states he has been clean since March 2017    Sexual activity: Yes   Lifestyle    Physical activity     Days per week: Not on file     Minutes per session: Not on file    Stress: Not on file   Relationships    Social connections     Talks on phone: Not on file     Gets together: Not on file     Attends Voodoo service: Not on file     Active member of club or organization: Not on file     Attends meetings of clubs or organizations: Not on file     Relationship status: Not on file    Intimate partner violence     Fear of current or ex partner: Not on file     Emotionally abused: Not on file     Physically abused: Not on file     Forced sexual activity: Not on file   Other Topics Concern    Not on file   Social History Narrative    Not on file     Family History   Problem Relation Age of Onset    No Known Problems Mother     Heart Disease Father         pacemaker    No Known Problems Sister     No Known Problems Brother     No Known Problems Son     No Known Problems Daughter      No Known Allergies    PMH, Surgical Hx, Family Hx, and Social Hx reviewed and updated. Health Maintenance reviewed.     PHYSICAL EXAMINATION:  \"[x]\" Indicates a positive item  \"[]\" Indicates a negative item    Vital Signs: (As obtained by patient/caregiver or practitioner observation)    Blood pressure-  Heart rate-    Respiratory rate-    Temperature-  Pulse oximetry-     Constitutional: [x] Appears well-developed and well-nourished [x] No apparent distress      [] Abnormal-   Mental status  [x] Alert and awake  [x] Oriented to person/place/time [x]Able to follow commands      Eyes:  EOM    []  Normal  [] Abnormal-  Sclera  [x]  Normal  [] Abnormal -         Discharge [x]  None visible  [] Abnormal -    HENT:   [x] Normocephalic, atraumatic. [] Abnormal   [x] Mouth/Throat: Mucous membranes are moist.     External Ears [x] Normal  [] Abnormal-     Neck: [x] No visualized mass     Pulmonary/Chest: [x] Respiratory effort normal.  [x] No visualized signs of difficulty breathing or respiratory distress        [] Abnormal-      Musculoskeletal:   [x] Normal gait with no signs of ataxia         [x] Normal range of motion of neck        [] Abnormal-       Neurological:       [x] No Facial Asymmetry (Cranial nerve 7 motor function) (limited exam to video visit)          [x] No gaze palsy        [] Abnormal-         Skin:        [x] No significant exanthematous lesions or discoloration noted on facial skin         [] Abnormal-            Psychiatric:       [x] Normal Affect [x] No Hallucinations        [] Abnormal-     Other pertinent observable physical exam findings-   Results for orders placed or performed in visit on 08/21/20   Culture, Throat    Specimen: Throat   Result Value Ref Range    Throat Culture Usual respiratory sara in 48 hours (A)     Organism Yeast (A)     Throat Culture Light growth  No further workup      COVID-19 Ambulatory    Specimen: Nasopharyngeal Swab   Result Value Ref Range    SARS-CoV-2 Not Detected Not Detected    Source OP swab        ASSESSMENT/PLAN:  Assessment & Plan   Diagnoses and all orders for this visit:    Exposure to COVID-19 virus  -     COVID-19 Ambulatory; Future      Orders Placed This Encounter   Procedures    COVID-19 Ambulatory     Standing Status:   Future     Standing Expiration Date:   11/17/2021     Scheduling Instructions:      Saline media preferred given current shortage of viral transport media but both acceptable     Order Specific Question:   Is this test for diagnosis or screening? Answer:   Diagnosis of ill patient     Order Specific Question:   Symptomatic for COVID-19 as defined by CDC?      Answer:   Yes     Order Specific Question:   Date of Symptom Onset     Answer: 11/13/2020     Order Specific Question:   Hospitalized for COVID-19? Answer:   No     Order Specific Question:   Admitted to ICU for COVID-19? Answer:   No     Order Specific Question:   Employed in healthcare setting? Answer:   No     Order Specific Question:   Resident in a congregate (group) care setting? Answer:   No     Order Specific Question:   Pregnant: Answer:   No     Order Specific Question:   Previously tested for COVID-19? Answer:   Yes     No orders of the defined types were placed in this encounter. There are no discontinued medications. No follow-ups on file. Reviewed with the patient: current clinical status, medications, activities and diet. Side effects, adverse effects of the medication prescribed today, as well as treatment plan/ rationale and result expectations have been discussed with the patient who expresses understanding and desires to proceed. Close follow up to evaluate treatment results and for coordination of care. I have reviewed the patient's medical history in detail and updated the computerized patient record. Patient identification was verified at the start of the visit: Yes    Total time spent on this encounter: Not billed by time      --INDIA Hines on 11/17/2020 at 1:32 PM    An electronic signature was used to authenticate this note.

## 2020-11-17 NOTE — LETTER
[facility]  [address] [facility] [address]  Phone: [phone]  Fax: [phone]        November 17, 2020     Patient: Radhika Calzada   YOB: 1975   Date of Visit: 11/17/2020       To Whom it May Concern:    Nabil Daugherty was seen in my clinic on 11/17/2020. He may return to work on 11/20/20 with no restrictions. he was tested for COVID-19 and needs to quarantine until the results come back. If you have any questions or concerns, please don't hesitate to call.     Sincerely,         INDIA Kamara

## 2020-11-20 LAB
SARS-COV-2: NOT DETECTED
SOURCE: NORMAL

## 2020-12-03 ENCOUNTER — APPOINTMENT (OUTPATIENT)
Dept: GENERAL RADIOLOGY | Age: 45
End: 2020-12-03
Payer: COMMERCIAL

## 2020-12-03 ENCOUNTER — HOSPITAL ENCOUNTER (EMERGENCY)
Age: 45
Discharge: HOME | End: 2020-12-03
Payer: COMMERCIAL

## 2020-12-03 VITALS
HEIGHT: 75 IN | RESPIRATION RATE: 17 BRPM | OXYGEN SATURATION: 100 % | BODY MASS INDEX: 27.35 KG/M2 | DIASTOLIC BLOOD PRESSURE: 73 MMHG | TEMPERATURE: 98.2 F | HEART RATE: 73 BPM | SYSTOLIC BLOOD PRESSURE: 152 MMHG | WEIGHT: 220 LBS

## 2020-12-03 LAB
ALBUMIN SERPL-MCNC: 4.7 G/DL (ref 3.5–4.6)
ALP BLD-CCNC: 79 U/L (ref 35–104)
ALT SERPL-CCNC: 14 U/L (ref 0–41)
ANION GAP SERPL CALCULATED.3IONS-SCNC: 7 MEQ/L (ref 9–15)
AST SERPL-CCNC: 17 U/L (ref 0–40)
BASOPHILS ABSOLUTE: 0.1 K/UL (ref 0–0.2)
BASOPHILS RELATIVE PERCENT: 1.3 %
BILIRUB SERPL-MCNC: <0.2 MG/DL (ref 0.2–0.7)
BUN BLDV-MCNC: 12 MG/DL (ref 6–20)
CALCIUM SERPL-MCNC: 9.6 MG/DL (ref 8.5–9.9)
CHLORIDE BLD-SCNC: 108 MEQ/L (ref 95–107)
CO2: 29 MEQ/L (ref 20–31)
CREAT SERPL-MCNC: 0.92 MG/DL (ref 0.7–1.2)
D DIMER: <0.27 MG/L FEU (ref 0–0.5)
EKG ATRIAL RATE: 62 BPM
EKG P AXIS: 18 DEGREES
EKG P-R INTERVAL: 170 MS
EKG Q-T INTERVAL: 394 MS
EKG QRS DURATION: 104 MS
EKG QTC CALCULATION (BAZETT): 399 MS
EKG R AXIS: 40 DEGREES
EKG T AXIS: 23 DEGREES
EKG VENTRICULAR RATE: 62 BPM
EOSINOPHILS ABSOLUTE: 0.1 K/UL (ref 0–0.7)
EOSINOPHILS RELATIVE PERCENT: 2.5 %
GFR AFRICAN AMERICAN: >60
GFR NON-AFRICAN AMERICAN: >60
GLOBULIN: 2.3 G/DL (ref 2.3–3.5)
GLUCOSE BLD-MCNC: 102 MG/DL (ref 70–99)
HCT VFR BLD CALC: 41.3 % (ref 42–52)
HEMOGLOBIN: 14 G/DL (ref 14–18)
LYMPHOCYTES ABSOLUTE: 2 K/UL (ref 1–4.8)
LYMPHOCYTES RELATIVE PERCENT: 33.8 %
MCH RBC QN AUTO: 31.1 PG (ref 27–31.3)
MCHC RBC AUTO-ENTMCNC: 33.9 % (ref 33–37)
MCV RBC AUTO: 91.7 FL (ref 80–100)
MONOCYTES ABSOLUTE: 0.4 K/UL (ref 0.2–0.8)
MONOCYTES RELATIVE PERCENT: 6 %
NEUTROPHILS ABSOLUTE: 3.3 K/UL (ref 1.4–6.5)
NEUTROPHILS RELATIVE PERCENT: 56.4 %
PDW BLD-RTO: 12.9 % (ref 11.5–14.5)
PLATELET # BLD: 223 K/UL (ref 130–400)
POTASSIUM SERPL-SCNC: 4.7 MEQ/L (ref 3.4–4.9)
RBC # BLD: 4.51 M/UL (ref 4.7–6.1)
SODIUM BLD-SCNC: 144 MEQ/L (ref 135–144)
TOTAL CK: 158 U/L (ref 0–190)
TOTAL PROTEIN: 7 G/DL (ref 6.3–8)
TROPONIN: <0.01 NG/ML (ref 0–0.01)
WBC # BLD: 5.9 K/UL (ref 4.8–10.8)

## 2020-12-03 PROCEDURE — 71045 X-RAY EXAM CHEST 1 VIEW: CPT

## 2020-12-03 PROCEDURE — 36415 COLL VENOUS BLD VENIPUNCTURE: CPT

## 2020-12-03 PROCEDURE — 82550 ASSAY OF CK (CPK): CPT

## 2020-12-03 PROCEDURE — 84484 ASSAY OF TROPONIN QUANT: CPT

## 2020-12-03 PROCEDURE — 99282 EMERGENCY DEPT VISIT SF MDM: CPT

## 2020-12-03 PROCEDURE — 6360000002 HC RX W HCPCS: Performed by: PHYSICIAN ASSISTANT

## 2020-12-03 PROCEDURE — 85025 COMPLETE CBC W/AUTO DIFF WBC: CPT

## 2020-12-03 PROCEDURE — 93005 ELECTROCARDIOGRAM TRACING: CPT | Performed by: EMERGENCY MEDICINE

## 2020-12-03 PROCEDURE — 80053 COMPREHEN METABOLIC PANEL: CPT

## 2020-12-03 PROCEDURE — 96374 THER/PROPH/DIAG INJ IV PUSH: CPT

## 2020-12-03 PROCEDURE — 85379 FIBRIN DEGRADATION QUANT: CPT

## 2020-12-03 RX ORDER — TRAMADOL HYDROCHLORIDE 50 MG/1
50 TABLET ORAL EVERY 4 HOURS PRN
Qty: 10 TABLET | Refills: 0 | Status: SHIPPED | OUTPATIENT
Start: 2020-12-03 | End: 2020-12-06

## 2020-12-03 RX ORDER — KETOROLAC TROMETHAMINE 15 MG/ML
15 INJECTION, SOLUTION INTRAMUSCULAR; INTRAVENOUS ONCE
Status: COMPLETED | OUTPATIENT
Start: 2020-12-03 | End: 2020-12-03

## 2020-12-03 RX ADMIN — KETOROLAC TROMETHAMINE 15 MG: 15 INJECTION, SOLUTION INTRAMUSCULAR; INTRAVENOUS at 17:51

## 2020-12-03 ASSESSMENT — PAIN SCALES - GENERAL
PAINLEVEL_OUTOF10: 8
PAINLEVEL_OUTOF10: 8

## 2020-12-03 ASSESSMENT — ENCOUNTER SYMPTOMS
VOMITING: 0
COLOR CHANGE: 0
SHORTNESS OF BREATH: 0
DIARRHEA: 0
RHINORRHEA: 0
EYE DISCHARGE: 0
NAUSEA: 0
ABDOMINAL DISTENTION: 0
SORE THROAT: 0
ABDOMINAL PAIN: 0
CONSTIPATION: 0

## 2020-12-03 ASSESSMENT — PAIN DESCRIPTION - DESCRIPTORS: DESCRIPTORS: STABBING

## 2020-12-03 ASSESSMENT — PAIN DESCRIPTION - ORIENTATION: ORIENTATION: MID

## 2020-12-03 ASSESSMENT — PAIN DESCRIPTION - LOCATION: LOCATION: CHEST

## 2020-12-03 NOTE — ED PROVIDER NOTES
[address] [facility] ED  eMERGENCY dEPARTMENT eNCOUnter      Pt Name: Hunter Weir  MRN: [id number]  [place] 1975  Date of evaluation: 12/3/2020  Provider: Jaelyn Rowe, [address] [place] [address]       Chief Complaint   Patient presents with    Chest Pain     CP since yesterday. HISTORY OF PRESENT ILLNESS   (Location/Symptom, Timing/Onset,Context/Setting, Quality, Duration, Modifying Factors, Severity)  Note limiting factors. Hunter Weir is a 50 y.o. male who presents to the emergency department with a complaint of left-sided stabbing chest pain which patient states started yesterday afternoon about 7 PM.  He states he has had multiple episodes, he states he describes as a knife stabbing him in the chest, he states that last 1 to 2 minutes in duration and seemed to resolve. He does not, shortness of breath, does not cause nausea or diaphoresis. Patient states his current pain at this time is a 6 out of 10. Midsternal region with no radiation. His pain does increase with movement of upper extremities, as well as deep inspiration. Patient's past medical history is significant for chronic back pain, anxiety. HPI    NursingNotes were reviewed. REVIEW OF SYSTEMS    (2-9 systems for level 4, 10 or more for level 5)     Review of Systems   Constitutional: Negative for activity change and appetite change. HENT: Negative for congestion, ear discharge, ear pain, nosebleeds, rhinorrhea and sore throat. Eyes: Negative for discharge. Respiratory: Negative for shortness of breath. Cardiovascular: Positive for chest pain. Negative for palpitations and leg swelling. Gastrointestinal: Negative for abdominal distention, abdominal pain, constipation, diarrhea, nausea and vomiting. Genitourinary: Negative for difficulty urinating and dysuria. Musculoskeletal: Negative for arthralgias. Skin: Negative for color change, pallor, rash and wound.    Neurological: Negative for dizziness, tremors, syncope, weakness, numbness and headaches. Psychiatric/Behavioral: Negative for agitation and confusion. Except as noted above the remainder of the review of systems was reviewed and negative. PAST MEDICAL HISTORY     Past Medical History:   Diagnosis Date    Anxiety     Chronic back pain     Drug abuse ([address]     pt states he has been clean since March 27,2017. Heroin abuse           SURGICALHISTORY       Past Surgical History:   Procedure Laterality Date    SEPTOPLASTY N/A 8/1/2019    SEPTOPLASTY, MICRODEBRIDER ASSISTED TURBINOPLASTY AND OUT FRACTURING BILATERAL NASAL ENDOSCOPY performed by Sharon Chinchilla MD at [address] [place] [address]       Previous Medications    ACETAMINOPHEN (TYLENOL) 650 MG EXTENDED RELEASE TABLET    TAKE 1 TABLET BY MOUTH EVERY 6 HOURS AS NEEDED FOR PAIN    ALBUTEROL SULFATE  (90 BASE) MCG/ACT INHALER    Inhale 2 puffs into the lungs every 4 hours as needed for Wheezing    ARTIFICIAL TEAR SOLUTION (GENTEAL TEARS) 0.1-0.2-0.3 % SOLN    INSTILL 1 DROP INTO AFFECTED EYE(S) EVERY 4 HOURS    BUSPIRONE (BUSPAR) 10 MG TABLET    TAKE 1 TABLET BY MOUTH TWICE DAILY    CLINDAMYCIN (CLEOCIN) 150 MG CAPSULE    TAKE 1 CAPSULE BY MOUTH EVERY 6 HOURS UNTIL GONE    FLUTICASONE (FLONASE) 50 MCG/ACT NASAL SPRAY    2 sprays by Nasal route daily    IBUPROFEN (ADVIL;MOTRIN) 800 MG TABLET    TAKE 1 TABLET BY MOUTH EVERY 6 HOURS AS NEEDED FOR PAIN    KETOROLAC (TORADOL) 10 MG TABLET    Take 1 tablet by mouth every 12 hours as needed for Pain    LIDOCAINE VISCOUS HCL (XYLOCAINE) 2 % SOLN SOLUTION    TAKE 5 ML BY MOUTH EVERY 4 TO 6 HOURS AS NEEDED FOR PAIN.  SWISH FOR 30 SECONDS AND SPIT    OXYCODONE-ACETAMINOPHEN (PERCOCET) 5-325 MG PER TABLET    TAKE 1 TABLET BY MOUTH EVERY 6 HOURS AS NEEDED FOR PAIN    QUETIAPINE (SEROQUEL) 100 MG TABLET    TAKE 1 TABLET BY MOUTH TWICE A DAY    SILDENAFIL (VIAGRA) 100 MG TABLET    TAKE 1 TABLET BY MOUTH AS NEEDED FOR ERECTILE DYSFUNCTION (NO MORE THAN 1 TABLET IN 24 HOURS)    VARENICLINE (CHANTIX CONTINUING MONTH LEWIS) 1 MG TABLET    Take 1 tablet by mouth 2 times daily    VARENICLINE (CHANTIX) 1 MG TABLET    Take 1 tablet by mouth 2 times daily       ALLERGIES     Patient has no known allergies.     FAMILY HISTORY       Family History   Problem Relation Age of Onset    No Known Problems Mother     Heart Disease Father         pacemaker    No Known Problems Sister     No Known Problems Brother     No Known Problems Son     No Known Problems Daughter           SOCIAL HISTORY       Social History     Socioeconomic History    Marital status:      Spouse name: None    Number of children: None    Years of education: None    Highest education level: None   Occupational History    None   Social Needs    Financial resource strain: None    Food insecurity     Worry: None     Inability: None    Transportation needs     Medical: None     Non-medical: None   Tobacco Use    Smoking status: Current Every Day Smoker     Packs/day: 0.50     Years: 27.00     Pack years: 13.50     Types: Cigarettes    Smokeless tobacco: Never Used   Substance and Sexual Activity    Alcohol use: No     Comment: quit 2012    Drug use: Yes     Types: Marijuana    Sexual activity: Yes   Lifestyle    Physical activity     Days per week: None     Minutes per session: None    Stress: None   Relationships    Social connections     Talks on phone: None     Gets together: None     Attends Cheondoism service: None     Active member of club or organization: None     Attends meetings of clubs or organizations: None     Relationship status: None    Intimate partner violence     Fear of current or ex partner: None     Emotionally abused: None     Physically abused: None     Forced sexual activity: None   Other Topics Concern    None   Social History Narrative    None       SCREENINGS      @FLOW([id number])@      PHYSICAL EXAM    (up to 7 for level 4, 8 or more for level 5)     ED Triage Vitals [12/03/20 1626]   BP Temp Temp Source Pulse Resp SpO2 Height Weight   (!) 189/135 98.2 °F (36.8 °C) Temporal 73 17 99 % 6' 3\" (1.905 m) 220 lb (99.8 kg)       Physical Exam  Vitals signs and nursing note reviewed. Constitutional:       General: He is not in acute distress. Appearance: He is well-developed. He is not ill-appearing, toxic-appearing or diaphoretic. HENT:      Head: Normocephalic. Right Ear: Tympanic membrane normal.      Left Ear: Tympanic membrane normal.      Nose: No congestion. Mouth/Throat:      Mouth: Mucous membranes are moist.      Pharynx: No oropharyngeal exudate or posterior oropharyngeal erythema. Eyes:      Extraocular Movements: Extraocular movements intact. Conjunctiva/sclera: Conjunctivae normal.      Pupils: Pupils are equal, round, and reactive to light. Neck:      Musculoskeletal: Normal range of motion and neck supple. No neck rigidity. Vascular: No JVD. Trachea: No tracheal deviation. Cardiovascular:      Rate and Rhythm: Normal rate. Pulses: Normal pulses. Heart sounds: Normal heart sounds. No murmur. No friction rub. No gallop. Pulmonary:      Effort: Pulmonary effort is normal. No tachypnea, accessory muscle usage, respiratory distress or retractions. Breath sounds: No stridor. No wheezing, rhonchi or rales. Comments: Patient has pain on palpation to mid left sternal border, there is no cut scrapes abrasions are seen, increased pain with deep inspiration, increased pain with elevation or movement of upper extremities. Lung sounds are clear in all fields, there is no wheezes rales or rhonchi, no accessory muscle use, room air saturations are 99%. Chest:      Chest wall: Tenderness present. No deformity, swelling, crepitus or edema. Breasts:         Right: No skin change. Left: No skin change. Abdominal:      General: Abdomen is flat.  Bowel sounds are normal. There is no distension or abdominal bruit. Palpations: There is no shifting dullness, fluid wave, hepatomegaly, splenomegaly, mass or pulsatile mass. Tenderness: There is no abdominal tenderness. There is no right CVA tenderness, left CVA tenderness, guarding or rebound. Negative signs include Winston's sign, Rovsing's sign and McBurney's sign. Musculoskeletal:         General: No deformity. Skin:     General: Skin is warm and dry. Capillary Refill: Capillary refill takes less than 2 seconds. Coloration: Skin is not jaundiced. Neurological:      General: No focal deficit present. Mental Status: He is alert and oriented to person, place, and time. Mental status is at baseline. Cranial Nerves: No cranial nerve deficit. Sensory: No sensory deficit. Motor: No weakness. Coordination: Coordination normal.   Psychiatric:         Mood and Affect: Mood normal.         DIAGNOSTIC RESULTS     EKG: All EKG's are interpreted by the Emergency Department Physician who either signs or Co-signsthis chart in the absence of a cardiologist.    EKG shows normal sinus rhythm at 62 bpm there is an incomplete right bundle branch block.   There is no acute ST segment abnormality, no ventricular ectopy QTC is 399 ms    RADIOLOGY:   Non-plain filmimages such as CT, Ultrasound and MRI are read by the radiologist. Plain radiographic images are visualized and preliminarily interpreted by the emergency physician with the below findings:        Interpretation per the Radiologist below, if available at the time ofthis note:    XR CHEST PORTABLE    (Results Pending)         ED BEDSIDE ULTRASOUND:   Performed by ED Physician - none    LABS:  Labs Reviewed   COMPREHENSIVE METABOLIC PANEL - Abnormal; Notable for the following components:       Result Value    Chloride 108 (*)     Anion Gap 7 (*)     Glucose 102 (*)     Alb 4.7 (*)     All other components within normal limits   CBC WITH AUTO 12/03/2020 07:20:24 PM      PATIENT REFERRED TO:  Loy De Guzman MD  [address] [facility]  [phone]    In 2 days        DISCHARGE MEDICATIONS:  New Prescriptions    TRAMADOL (ULTRAM) 50 MG TABLET    Take 1 tablet by mouth every 4 hours as needed for Pain for up to 3 days. Intended supply: 3 days. Take lowest dose possible to manage pain          (Please note that portions of this note were completed with a voice recognition program.  Efforts were made to edit the dictations but occasionally words are mis-transcribed. )    INDIA Reeves (electronically signed)  Attending Emergency Physician         Severo Potters, [place]  12/03/20 1255

## 2020-12-03 NOTE — ED TRIAGE NOTES
Pt c/o CP that started yesterday that has become progressively worse.  Pt states that it now feels that he has a knife in his chest. Rates pain at 8/10

## 2020-12-04 PROCEDURE — 93010 ELECTROCARDIOGRAM REPORT: CPT | Performed by: INTERNAL MEDICINE

## 2020-12-15 PROBLEM — G44.229 CHRONIC TENSION-TYPE HEADACHE, NOT INTRACTABLE: Status: ACTIVE | Noted: 2020-12-15

## 2020-12-15 RX ORDER — VARENICLINE TARTRATE 1 MG/1
1 TABLET, FILM COATED ORAL 2 TIMES DAILY
Qty: 60 TABLET | Refills: 0 | OUTPATIENT
Start: 2020-12-15

## 2020-12-15 NOTE — TELEPHONE ENCOUNTER
Patient requesting medication refill.  Please approve or deny this request.    Rx requested:  Requested Prescriptions     Pending Prescriptions Disp Refills    varenicline (CHANTIX) 1 MG tablet 60 tablet 0     Sig: Take 1 tablet by mouth 2 times daily         Last Office Visit:   12/11/2020      Next Visit Date:  Future Appointments   Date Time Provider Molly Katie   12/17/2020  9:00 AM Ronaldo Reyna, [place]   12/17/2020  3:00 PM Chantell Arias MD [facility]

## 2020-12-17 ENCOUNTER — OFFICE VISIT (OUTPATIENT)
Dept: ORTHOPEDIC SURGERY | Age: 45
End: 2020-12-17
Payer: COMMERCIAL

## 2020-12-17 ENCOUNTER — OFFICE VISIT (OUTPATIENT)
Dept: NEUROLOGY | Age: 45
End: 2020-12-17
Payer: COMMERCIAL

## 2020-12-17 VITALS
BODY MASS INDEX: 27.87 KG/M2 | OXYGEN SATURATION: 98 % | TEMPERATURE: 97.2 F | WEIGHT: 223 LBS | SYSTOLIC BLOOD PRESSURE: 132 MMHG | DIASTOLIC BLOOD PRESSURE: 70 MMHG | HEART RATE: 72 BPM

## 2020-12-17 VITALS
TEMPERATURE: 97.5 F | SYSTOLIC BLOOD PRESSURE: 124 MMHG | HEART RATE: 70 BPM | WEIGHT: 223 LBS | BODY MASS INDEX: 27.87 KG/M2 | DIASTOLIC BLOOD PRESSURE: 71 MMHG

## 2020-12-17 PROCEDURE — 4004F PT TOBACCO SCREEN RCVD TLK: CPT | Performed by: ORTHOPAEDIC SURGERY

## 2020-12-17 PROCEDURE — 99203 OFFICE O/P NEW LOW 30 MIN: CPT | Performed by: ORTHOPAEDIC SURGERY

## 2020-12-17 PROCEDURE — G8484 FLU IMMUNIZE NO ADMIN: HCPCS | Performed by: NURSE PRACTITIONER

## 2020-12-17 PROCEDURE — G8484 FLU IMMUNIZE NO ADMIN: HCPCS | Performed by: ORTHOPAEDIC SURGERY

## 2020-12-17 PROCEDURE — G8427 DOCREV CUR MEDS BY ELIG CLIN: HCPCS | Performed by: ORTHOPAEDIC SURGERY

## 2020-12-17 PROCEDURE — G8417 CALC BMI ABV UP PARAM F/U: HCPCS | Performed by: NURSE PRACTITIONER

## 2020-12-17 PROCEDURE — G8417 CALC BMI ABV UP PARAM F/U: HCPCS | Performed by: ORTHOPAEDIC SURGERY

## 2020-12-17 PROCEDURE — 20610 DRAIN/INJ JOINT/BURSA W/O US: CPT | Performed by: ORTHOPAEDIC SURGERY

## 2020-12-17 PROCEDURE — 4004F PT TOBACCO SCREEN RCVD TLK: CPT | Performed by: NURSE PRACTITIONER

## 2020-12-17 PROCEDURE — G8427 DOCREV CUR MEDS BY ELIG CLIN: HCPCS | Performed by: NURSE PRACTITIONER

## 2020-12-17 PROCEDURE — 99213 OFFICE O/P EST LOW 20 MIN: CPT | Performed by: NURSE PRACTITIONER

## 2020-12-17 RX ORDER — LIDOCAINE HYDROCHLORIDE 10 MG/ML
5 INJECTION, SOLUTION INFILTRATION; PERINEURAL ONCE
Status: COMPLETED | OUTPATIENT
Start: 2020-12-17 | End: 2020-12-17

## 2020-12-17 RX ORDER — METHYLPREDNISOLONE 4 MG/1
TABLET ORAL
Qty: 1 KIT | Refills: 0 | Status: SHIPPED | OUTPATIENT
Start: 2020-12-17 | End: 2020-12-23

## 2020-12-17 RX ORDER — METHYLPREDNISOLONE ACETATE 80 MG/ML
80 INJECTION, SUSPENSION INTRA-ARTICULAR; INTRALESIONAL; INTRAMUSCULAR; SOFT TISSUE ONCE
Status: COMPLETED | OUTPATIENT
Start: 2020-12-17 | End: 2020-12-17

## 2020-12-17 RX ADMIN — METHYLPREDNISOLONE ACETATE 80 MG: 80 INJECTION, SUSPENSION INTRA-ARTICULAR; INTRALESIONAL; INTRAMUSCULAR; SOFT TISSUE at 15:49

## 2020-12-17 RX ADMIN — LIDOCAINE HYDROCHLORIDE 5 ML: 10 INJECTION, SOLUTION INFILTRATION; PERINEURAL at 15:48

## 2020-12-17 ASSESSMENT — ENCOUNTER SYMPTOMS
NAUSEA: 0
SHORTNESS OF BREATH: 0
CHEST TIGHTNESS: 0
TROUBLE SWALLOWING: 0
WHEEZING: 0
VOMITING: 0
COUGH: 0
BACK PAIN: 0
COLOR CHANGE: 0
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
## 2021-01-02 ENCOUNTER — HOSPITAL ENCOUNTER (OUTPATIENT)
Dept: MRI IMAGING | Age: 46
Discharge: HOME | End: 2021-01-04
Payer: COMMERCIAL

## 2021-01-02 DIAGNOSIS — M54.2 CERVICALGIA: ICD-10-CM

## 2021-01-02 DIAGNOSIS — G44.229 CHRONIC TENSION-TYPE HEADACHE, NOT INTRACTABLE: ICD-10-CM

## 2021-01-02 DIAGNOSIS — M54.12 CERVICAL RADICULOPATHY: ICD-10-CM

## 2021-01-02 PROCEDURE — 72141 MRI NECK SPINE W/O DYE: CPT

## 2021-01-05 ENCOUNTER — HOSPITAL ENCOUNTER (OUTPATIENT)
Dept: PHYSICAL THERAPY | Age: 46
Setting detail: THERAPIES SERIES
Discharge: HOME | End: 2021-01-05
Payer: COMMERCIAL

## 2021-01-05 PROCEDURE — 97161 PT EVAL LOW COMPLEX 20 MIN: CPT

## 2021-01-05 ASSESSMENT — PAIN DESCRIPTION - LOCATION: LOCATION: NECK

## 2021-01-05 ASSESSMENT — PAIN SCALES - GENERAL: PAINLEVEL_OUTOF10: 8

## 2021-01-05 ASSESSMENT — PAIN DESCRIPTION - DESCRIPTORS: DESCRIPTORS: CONSTANT;PRESSURE
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
## 2021-01-05 NOTE — PROGRESS NOTES
[address]     Ph: [phone]  Fax: [phone]    [] Certification  [] Recertification [x]  Plan of Care  [] Progress Note [] Discharge      To: PATRICIA Payton-CNP      From:  Kervin Guzmán, PT  Patient: Sae Barraza     : 1975  Diagnosis: chronic tension-type headache, not intractable; cervicalgia; cervical radiculopathy     Date: 2021  Treatment Diagnosis: neck pain, headaches, decreased cervical spine ROM, neck muscle tightness, impaired posture       Progress Report Period from:  2021  to 2021    Total # of Visits to Date: 1   No Show: 0    Canceled Appointment: 0     OBJECTIVE:   Long Term Goals - Time Frame for Long term goals : 4-6 weeks  Goals Current/ Discharge status Met   Long term goal 1: Pt will be independent and compliant with HEP for self management of symptoms upon discharge HEP to be initiated and progressed to pt tolerance [] yes  [x] no   Long term goal 2: Pt will report neck pain does not surpass 3/10 with daily activity   Pain Location: Neck    Pain Level: 8    Pain Descriptors: Constant, Pressure     [] yes  [x] no   Long term goal 3: Pt will improve cervical AROM by at least 10* in all functional planes to improve head mobility for driving. Spine  Cervical: Flex: 40, Ext: 32, Rot L: 50, Rot R: 33, SB L: 25, SB R: 28 - Pt reports discomfort with AROM in all functional planes  Special Tests: Spurlings Compression: pain, Spurlings Distraction: mild relief noted      [] yes  [x] no   Long term goal 4: Pt will improve NDI score to < 15/50 to demo functional improvement Exam: NDI: 23/50   [] yes  [x] no       Body structures, Functions, Activity limitations: Increased pain, Decreased posture, Decreased ROM  Assessment: Pt is a 51 yo male referred for PT eval of chronic neck pain. Pt had MRI completed prior to initiating PT.  Pt displays decreased head and neck

## 2021-01-06 ENCOUNTER — OFFICE VISIT (OUTPATIENT)
Dept: FAMILY MEDICINE CLINIC | Age: 46
End: 2021-01-06
Payer: COMMERCIAL

## 2021-01-06 VITALS
TEMPERATURE: 97.6 F | BODY MASS INDEX: 27.73 KG/M2 | SYSTOLIC BLOOD PRESSURE: 130 MMHG | OXYGEN SATURATION: 98 % | DIASTOLIC BLOOD PRESSURE: 80 MMHG | HEIGHT: 75 IN | RESPIRATION RATE: 16 BRPM | WEIGHT: 223 LBS | HEART RATE: 70 BPM

## 2021-01-06 DIAGNOSIS — N52.9 ED (ERECTILE DYSFUNCTION) OF ORGANIC ORIGIN: ICD-10-CM

## 2021-01-06 DIAGNOSIS — M23.204 OLD TEAR OF MEDIAL MENISCUS OF LEFT KNEE, UNSPECIFIED TEAR TYPE: ICD-10-CM

## 2021-01-06 DIAGNOSIS — Z23 NEED FOR IMMUNIZATION AGAINST INFLUENZA: Primary | ICD-10-CM
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
## 2021-01-06 DIAGNOSIS — G44.321 INTRACTABLE CHRONIC POST-TRAUMATIC HEADACHE: ICD-10-CM

## 2021-01-06 DIAGNOSIS — J30.9 ALLERGIC RHINITIS, UNSPECIFIED SEASONALITY, UNSPECIFIED TRIGGER: ICD-10-CM

## 2021-01-06 DIAGNOSIS — Z72.0 TOBACCO ABUSE: ICD-10-CM

## 2021-01-06 PROCEDURE — G8417 CALC BMI ABV UP PARAM F/U: HCPCS | Performed by: FAMILY MEDICINE

## 2021-01-06 PROCEDURE — 90688 IIV4 VACCINE SPLT 0.5 ML IM: CPT | Performed by: FAMILY MEDICINE

## 2021-01-06 PROCEDURE — G8482 FLU IMMUNIZE ORDER/ADMIN: HCPCS | Performed by: FAMILY MEDICINE

## 2021-01-06 PROCEDURE — 4004F PT TOBACCO SCREEN RCVD TLK: CPT | Performed by: FAMILY MEDICINE

## 2021-01-06 PROCEDURE — G8427 DOCREV CUR MEDS BY ELIG CLIN: HCPCS | Performed by: FAMILY MEDICINE

## 2021-01-06 PROCEDURE — 99214 OFFICE O/P EST MOD 30 MIN: CPT | Performed by: FAMILY MEDICINE

## 2021-01-06 RX ORDER — DEXTRAN 70, GLYCERIN, HYPROMELLOSE 1; 2; 3 MG/ML; MG/ML; MG/ML
SOLUTION/ DROPS OPHTHALMIC
Status: CANCELLED | OUTPATIENT
Start: 2021-01-06

## 2021-01-06 RX ORDER — FLUTICASONE PROPIONATE 50 MCG
2 SPRAY, SUSPENSION (ML) NASAL DAILY
Qty: 1 BOTTLE | Refills: 3 | Status: SHIPPED | OUTPATIENT
Start: 2021-01-06 | End: 2021-09-01 | Stop reason: SDUPTHER

## 2021-01-06 RX ORDER — SILDENAFIL 100 MG/1
TABLET, FILM COATED ORAL
Qty: 30 TABLET | Refills: 1 | Status: SHIPPED | OUTPATIENT
Start: 2021-01-06 | End: 2021-08-27 | Stop reason: SDUPTHER

## 2021-01-06 RX ORDER — VARENICLINE TARTRATE 1 MG/1
1 TABLET, FILM COATED ORAL 2 TIMES DAILY
Qty: 60 TABLET | Refills: 3 | Status: SHIPPED | OUTPATIENT
Start: 2021-01-06 | End: 2021-05-28

## 2021-01-06 RX ORDER — VARENICLINE TARTRATE
KIT
Qty: 60 TABLET | Refills: 0 | Status: SHIPPED | OUTPATIENT
Start: 2021-01-06 | End: 2021-05-28

## 2021-01-06 RX ORDER — VARENICLINE TARTRATE 1 MG/1
1 TABLET, FILM COATED ORAL 2 TIMES DAILY
Qty: 60 TABLET | Refills: 0 | Status: CANCELLED | OUTPATIENT
Start: 2021-01-06

## 2021-01-06 ASSESSMENT — PATIENT HEALTH QUESTIONNAIRE - PHQ9
1. LITTLE INTEREST OR PLEASURE IN DOING THINGS: 0
2. FEELING DOWN, DEPRESSED OR HOPELESS: 0
SUM OF ALL RESPONSES TO PHQ9 QUESTIONS 1 & 2: 0
SUM OF ALL RESPONSES TO PHQ QUESTIONS 1-9: 0

## 2021-01-06 ASSESSMENT — ENCOUNTER SYMPTOMS: VOMITING: 0
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
## 2021-01-11 ENCOUNTER — HOSPITAL ENCOUNTER (OUTPATIENT)
Dept: PHYSICAL THERAPY | Age: 46
Setting detail: THERAPIES SERIES
Discharge: HOME | End: 2021-01-11
Payer: COMMERCIAL
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
## 2021-01-13 ENCOUNTER — HOSPITAL ENCOUNTER (OUTPATIENT)
Dept: PHYSICAL THERAPY | Age: 46
Setting detail: THERAPIES SERIES
Discharge: HOME | End: 2021-01-13
Payer: COMMERCIAL

## 2021-01-13 PROCEDURE — 97110 THERAPEUTIC EXERCISES: CPT

## 2021-01-13 PROCEDURE — 97140 MANUAL THERAPY 1/> REGIONS: CPT

## 2021-01-13 ASSESSMENT — PAIN DESCRIPTION - DESCRIPTORS: DESCRIPTORS: CONSTANT

## 2021-01-13 ASSESSMENT — PAIN SCALES - GENERAL: PAINLEVEL_OUTOF10: 7
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
## 2021-01-18 ENCOUNTER — HOSPITAL ENCOUNTER (OUTPATIENT)
Dept: PHYSICAL THERAPY | Age: 46
Setting detail: THERAPIES SERIES
Discharge: HOME | End: 2021-01-18
Payer: COMMERCIAL

## 2021-01-20 ENCOUNTER — HOSPITAL ENCOUNTER (OUTPATIENT)
Dept: PHYSICAL THERAPY | Age: 46
Setting detail: THERAPIES SERIES
Discharge: HOME | End: 2021-01-20
Payer: COMMERCIAL

## 2021-01-20 PROCEDURE — 97140 MANUAL THERAPY 1/> REGIONS: CPT

## 2021-01-20 PROCEDURE — 97110 THERAPEUTIC EXERCISES: CPT

## 2021-01-20 ASSESSMENT — PAIN SCALES - GENERAL: PAINLEVEL_OUTOF10: 7

## 2021-01-20 ASSESSMENT — PAIN DESCRIPTION - LOCATION: LOCATION: NECK
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
## 2021-01-21 ENCOUNTER — TELEPHONE (OUTPATIENT)
Dept: NEUROLOGY | Age: 46
End: 2021-01-21

## 2021-01-21 NOTE — TELEPHONE ENCOUNTER
Reviewed results of MRI of cervical spine. Called patient to discuss. No answer. Message left on voicemail informing him that no further orders or intervention needed at this time. Office number left for him to call with questions.

## 2021-02-01 ENCOUNTER — HOSPITAL ENCOUNTER (OUTPATIENT)
Dept: PHYSICAL THERAPY | Age: 46
Setting detail: THERAPIES SERIES
Discharge: HOME | End: 2021-02-01
Payer: COMMERCIAL

## 2021-02-01 PROCEDURE — 97110 THERAPEUTIC EXERCISES: CPT

## 2021-02-01 PROCEDURE — 97140 MANUAL THERAPY 1/> REGIONS: CPT

## 2021-02-01 ASSESSMENT — PAIN SCALES - GENERAL: PAINLEVEL_OUTOF10: 6

## 2021-02-01 ASSESSMENT — PAIN DESCRIPTION - LOCATION: LOCATION: NECK

## 2021-02-01 ASSESSMENT — PAIN DESCRIPTION - DESCRIPTORS: DESCRIPTORS: ACHING;CONSTANT
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
## 2021-02-03 ENCOUNTER — HOSPITAL ENCOUNTER (OUTPATIENT)
Dept: PHYSICAL THERAPY | Age: 46
Setting detail: THERAPIES SERIES
Discharge: HOME | End: 2021-02-03
Payer: COMMERCIAL

## 2021-02-03 PROCEDURE — 97110 THERAPEUTIC EXERCISES: CPT

## 2021-02-03 PROCEDURE — G0283 ELEC STIM OTHER THAN WOUND: HCPCS

## 2021-02-03 PROCEDURE — 97140 MANUAL THERAPY 1/> REGIONS: CPT

## 2021-02-03 ASSESSMENT — PAIN DESCRIPTION - LOCATION: LOCATION: NECK

## 2021-02-03 ASSESSMENT — PAIN SCALES - GENERAL: PAINLEVEL_OUTOF10: 6
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
## 2021-02-08 ENCOUNTER — HOSPITAL ENCOUNTER (OUTPATIENT)
Dept: PHYSICAL THERAPY | Age: 46
Setting detail: THERAPIES SERIES
Discharge: HOME | End: 2021-02-08
Payer: COMMERCIAL

## 2021-02-08 PROCEDURE — 97110 THERAPEUTIC EXERCISES: CPT

## 2021-02-08 ASSESSMENT — PAIN SCALES - GENERAL: PAINLEVEL_OUTOF10: 5
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
## 2021-02-08 NOTE — PROGRESS NOTES
[address]     Ph: [phone]  Fax: [phone]    [] Certification  [] Recertification []  Plan of Care  [] Progress Note [x] Discharge      To: PATRICIA Kelly-JEREMY      From:  Khanh Tenorio, PT, DPT  Patient: Norm Payment     : 1975  Diagnosis: chronic tension-type headache, not intractable; cervicalgia; cervical radiculopathy     Date: 2021        Progress Report Period from:  2021  to 2021    Total # of Visits to Date: 6   No Show: 2    Canceled Appointment: 2     OBJECTIVE:   Long Term Goals - Time Frame for Long term goals : 4-6 weeks  Goals Current/ Discharge status Met   Long term goal 1: Pt will be independent and compliant with HEP for self management of symptoms upon discharge Full HEP reviewed with pt; pt reports good understanding of all exercises addressed  [x] yes  [] no   Long term goal 2: Pt will report neck pain does not surpass 3/10 with daily activity Neck pain ranging 2-6/10 over last 2 weeks of tx. Pt reports temporary relief with heat and e-stim modalities. Pt declines pain limiting any daily activities. [] yes  [x] no   Long term goal 3: Pt will improve cervical AROM by at least 10* in all functional planes to improve head mobility for driving.  Spine  Cervical: Flex: 55, ext 48, SB Lt 35, SB Rt 35, Rot Lt 65, Rot Rt 65        [x] yes  [] no   Long term goal 4: Pt will improve NDI score to < 15/50 to demo functional improvement Exam: NDI: 0/50 at completion of last PT session   [x] yes  [] no Assessment: At this time, pt displays symmetrical and full cervical ROM in all planes with no increased c/o pain. Pt displays very good strength throughout cervical and UE exercises addressed, although required frequent postural cues with resting and standing activity to decrease head flex and neck strain. Pt has been educated on a comprehensive HEP that includes cervical muscle stretches, cervical AROM addressing all spinal segments, and postural strengthening. Pt reports that despite not achieving complete symptom relief, he feels well educated in his HEP and using therapeutic techniques to manage his daily chronic neck pain. Pt has trialed E-stim and moist heat modalities to his neck, noting short term symptom relief. Pt is aware TENs units can be purchased both OTC or with a script from his PCP. Pt will be discharged at this time, with recommendation to continue HEP. PLAN:   Frequency/Duration:  Plan  Plan Comment: D/C PT POC; continue with HEP                           Patient Status:[] Continue/ Initiate plan of Care    [x] Discharge PT. Recommend pt continue with HEP. [] Additional visits requested, Please re-certify for additional visits:          Signature: Electronically signed by Adam Ayoub PT on 2/8/21 at 5:49 PM EST      If you have any questions or concerns, please don't hesitate to call. Thank you for your referral.    I have reviewed this plan of care and certify a need for medically necessary rehabilitation services.     Physician Signature:__________________________________________________________  Date:  Please sign and return

## 2021-02-10 ENCOUNTER — APPOINTMENT (OUTPATIENT)
Dept: PHYSICAL THERAPY | Age: 46
End: 2021-02-10
Payer: COMMERCIAL

## 2021-02-15 ENCOUNTER — APPOINTMENT (OUTPATIENT)
Dept: PHYSICAL THERAPY | Age: 46
End: 2021-02-15
Payer: COMMERCIAL

## 2021-02-15 ENCOUNTER — OFFICE VISIT (OUTPATIENT)
Dept: FAMILY MEDICINE CLINIC | Age: 46
End: 2021-02-15
Payer: COMMERCIAL

## 2021-02-15 VITALS
DIASTOLIC BLOOD PRESSURE: 80 MMHG | HEIGHT: 75 IN | BODY MASS INDEX: 27.35 KG/M2 | WEIGHT: 220 LBS | TEMPERATURE: 97.9 F | HEART RATE: 68 BPM | RESPIRATION RATE: 16 BRPM | SYSTOLIC BLOOD PRESSURE: 122 MMHG | OXYGEN SATURATION: 97 %

## 2021-02-15 DIAGNOSIS — G89.29 CHRONIC NECK PAIN: ICD-10-CM

## 2021-02-15 DIAGNOSIS — G44.229 CHRONIC TENSION-TYPE HEADACHE, NOT INTRACTABLE: Primary | ICD-10-CM

## 2021-02-15 DIAGNOSIS — M54.2 CHRONIC NECK PAIN: ICD-10-CM

## 2021-02-15 PROCEDURE — 99213 OFFICE O/P EST LOW 20 MIN: CPT | Performed by: FAMILY MEDICINE

## 2021-02-15 PROCEDURE — 4004F PT TOBACCO SCREEN RCVD TLK: CPT | Performed by: FAMILY MEDICINE

## 2021-02-15 PROCEDURE — G8482 FLU IMMUNIZE ORDER/ADMIN: HCPCS | Performed by: FAMILY MEDICINE

## 2021-02-15 PROCEDURE — G8427 DOCREV CUR MEDS BY ELIG CLIN: HCPCS | Performed by: FAMILY MEDICINE

## 2021-02-15 PROCEDURE — G8417 CALC BMI ABV UP PARAM F/U: HCPCS | Performed by: FAMILY MEDICINE

## 2021-02-15 ASSESSMENT — ENCOUNTER SYMPTOMS
NAUSEA: 0
VOMITING: 0

## 2021-02-15 NOTE — PROGRESS NOTES
Emotionally abused: Not on file     Physically abused: Not on file     Forced sexual activity: Not on file   Other Topics Concern    Not on file   Social History Narrative    Not on file     Current Outpatient Medications   Medication Sig Dispense Refill    fluticasone (FLONASE) 50 MCG/ACT nasal spray 2 sprays by Nasal route daily 1 Bottle 3    sildenafil (VIAGRA) 100 MG tablet TAKE 1 TABLET BY MOUTH AS NEEDED FOR ERECTILE DYSFUNCTION (NO MORE THAN 1 TABLET IN 24 HOURS) 30 tablet 1    varenicline (CHANTIX STARTING MONTH PAK) 0.5 MG X 11 & 1 MG X 42 tablet As directed 60 tablet 0    varenicline (CHANTIX CONTINUING MONTH PAK) 1 MG tablet Take 1 tablet by mouth 2 times daily 60 tablet 3    varenicline (CHANTIX) 1 MG tablet Take 1 tablet by mouth 2 times daily 60 tablet 0    busPIRone (BUSPAR) 10 MG tablet TAKE 1 TABLET BY MOUTH TWICE DAILY      ibuprofen (ADVIL;MOTRIN) 800 MG tablet TAKE 1 TABLET BY MOUTH EVERY 6 HOURS AS NEEDED FOR PAIN      Artificial Tear Solution (GENTEAL TEARS) 0.1-0.2-0.3 % SOLN INSTILL 1 DROP INTO AFFECTED EYE(S) EVERY 4 HOURS      QUEtiapine (SEROQUEL) 100 MG tablet TAKE 1 TABLET BY MOUTH TWICE A DAY 60 tablet 0     No current facility-administered medications for this visit. 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 Heroin abuse       Objective:   /80   Pulse 68   Temp 97.9 °F (36.6 °C)   Resp 16   Ht 6' 3\" (1.905 m)   Wt 220 lb (99.8 kg)   SpO2 97%   BMI 27.50 kg/m²     Physical Exam             .    Neck:   No rigidity. No masses. No thyroid asymmetry. Full rom. No neck tenderness.     Lungs:  Clear equal

## 2021-05-24 ENCOUNTER — HOSPITAL ENCOUNTER (EMERGENCY)
Age: 46
Discharge: HOME | End: 2021-05-24
Attending: EMERGENCY MEDICINE
Payer: COMMERCIAL

## 2021-05-24 ENCOUNTER — APPOINTMENT (OUTPATIENT)
Dept: CT IMAGING | Age: 46
End: 2021-05-24
Payer: COMMERCIAL

## 2021-05-24 VITALS
WEIGHT: 220 LBS | OXYGEN SATURATION: 99 % | TEMPERATURE: 98 F | BODY MASS INDEX: 27.35 KG/M2 | SYSTOLIC BLOOD PRESSURE: 132 MMHG | HEIGHT: 75 IN | RESPIRATION RATE: 20 BRPM | HEART RATE: 62 BPM | DIASTOLIC BLOOD PRESSURE: 68 MMHG
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
## 2021-05-24 DIAGNOSIS — S16.1XXA STRAIN OF NECK MUSCLE, INITIAL ENCOUNTER: ICD-10-CM

## 2021-05-24 PROCEDURE — 70450 CT HEAD/BRAIN W/O DYE: CPT

## 2021-05-24 PROCEDURE — 99284 EMERGENCY DEPT VISIT MOD MDM: CPT

## 2021-05-24 PROCEDURE — 6360000002 HC RX W HCPCS: Performed by: EMERGENCY MEDICINE

## 2021-05-24 PROCEDURE — 96372 THER/PROPH/DIAG INJ SC/IM: CPT

## 2021-05-24 RX ORDER — KETOROLAC TROMETHAMINE 30 MG/ML
60 INJECTION, SOLUTION INTRAMUSCULAR; INTRAVENOUS ONCE
Status: COMPLETED | OUTPATIENT
Start: 2021-05-24 | End: 2021-05-24

## 2021-05-24 RX ORDER — ORPHENADRINE CITRATE 30 MG/ML
60 INJECTION INTRAMUSCULAR; INTRAVENOUS ONCE
Status: COMPLETED | OUTPATIENT
Start: 2021-05-24 | End: 2021-05-24

## 2021-05-24 RX ORDER — CYCLOBENZAPRINE HCL 10 MG
10 TABLET ORAL 3 TIMES DAILY PRN
Qty: 21 TABLET | Refills: 0 | Status: SHIPPED | OUTPATIENT
Start: 2021-05-24 | End: 2021-06-03

## 2021-05-24 RX ORDER — NAPROXEN 500 MG/1
500 TABLET ORAL 2 TIMES DAILY WITH MEALS
Qty: 30 TABLET | Refills: 0 | Status: SHIPPED | OUTPATIENT
Start: 2021-05-24 | End: 2022-02-14

## 2021-05-24 RX ADMIN — KETOROLAC TROMETHAMINE 60 MG: 30 INJECTION, SOLUTION INTRAMUSCULAR at 17:16

## 2021-05-24 RX ADMIN — ORPHENADRINE CITRATE 60 MG: 60 INJECTION INTRAMUSCULAR; INTRAVENOUS at 17:18

## 2021-05-24 ASSESSMENT — ENCOUNTER SYMPTOMS
SHORTNESS OF BREATH: 0
COLOR CHANGE: 0
RHINORRHEA: 0
ABDOMINAL PAIN: 0
EYE DISCHARGE: 0
FACIAL SWELLING: 0
VOMITING: 0

## 2021-05-24 ASSESSMENT — PAIN DESCRIPTION - LOCATION: LOCATION: BACK;NECK

## 2021-05-24 ASSESSMENT — PAIN DESCRIPTION - PAIN TYPE: TYPE: ACUTE PAIN
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
## 2021-05-28 ENCOUNTER — OFFICE VISIT (OUTPATIENT)
Dept: FAMILY MEDICINE CLINIC | Age: 46
End: 2021-05-28
Payer: COMMERCIAL

## 2021-05-28 VITALS
WEIGHT: 221 LBS | HEART RATE: 93 BPM | BODY MASS INDEX: 27.48 KG/M2 | TEMPERATURE: 97.3 F | HEIGHT: 75 IN | OXYGEN SATURATION: 100 % | SYSTOLIC BLOOD PRESSURE: 120 MMHG | DIASTOLIC BLOOD PRESSURE: 78 MMHG

## 2021-05-28 DIAGNOSIS — H66.001 NON-RECURRENT ACUTE SUPPURATIVE OTITIS MEDIA OF RIGHT EAR WITHOUT SPONTANEOUS RUPTURE OF TYMPANIC MEMBRANE: Primary | ICD-10-CM

## 2021-05-28 DIAGNOSIS — R05.8 PRODUCTIVE COUGH: ICD-10-CM

## 2021-05-28 DIAGNOSIS — R06.02 SOB (SHORTNESS OF BREATH): ICD-10-CM

## 2021-05-28 DIAGNOSIS — F17.200 CURRENT EVERY DAY SMOKER: ICD-10-CM

## 2021-05-28 PROCEDURE — G8417 CALC BMI ABV UP PARAM F/U: HCPCS | Performed by: NURSE PRACTITIONER

## 2021-05-28 PROCEDURE — G8427 DOCREV CUR MEDS BY ELIG CLIN: HCPCS | Performed by: NURSE PRACTITIONER

## 2021-05-28 PROCEDURE — 99213 OFFICE O/P EST LOW 20 MIN: CPT | Performed by: NURSE PRACTITIONER

## 2021-05-28 PROCEDURE — 4004F PT TOBACCO SCREEN RCVD TLK: CPT | Performed by: NURSE PRACTITIONER

## 2021-05-28 RX ORDER — GUAIFENESIN 600 MG/1
1200 TABLET, EXTENDED RELEASE ORAL 2 TIMES DAILY
Qty: 40 TABLET | Refills: 0 | Status: SHIPPED | OUTPATIENT
Start: 2021-05-28 | End: 2022-09-30 | Stop reason: SDUPTHER

## 2021-05-28 RX ORDER — METHYLPREDNISOLONE 4 MG/1
TABLET ORAL
Qty: 1 KIT | Refills: 0 | Status: SHIPPED | OUTPATIENT
Start: 2021-05-28 | End: 2021-09-01

## 2021-05-28 RX ORDER — ALBUTEROL SULFATE 90 UG/1
2 AEROSOL, METERED RESPIRATORY (INHALATION) EVERY 6 HOURS PRN
Qty: 1 INHALER | Refills: 0 | Status: SHIPPED | OUTPATIENT
Start: 2021-05-28 | End: 2022-08-17 | Stop reason: SDUPTHER

## 2021-05-28 RX ORDER — AMOXICILLIN 875 MG/1
875 TABLET, COATED ORAL 2 TIMES DAILY
Qty: 20 TABLET | Refills: 0 | Status: SHIPPED | OUTPATIENT
Start: 2021-05-28 | End: 2021-06-07
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
## 2021-06-03 LAB
SARS-COV-2: NOT DETECTED
SOURCE: NORMAL

## 2021-06-08 DIAGNOSIS — Z72.0 TOBACCO ABUSE: ICD-10-CM

## 2021-06-08 PROBLEM — J34.2 NASAL SEPTAL DEVIATION: Status: ACTIVE | Noted: 2017-10-19

## 2021-06-08 PROBLEM — M54.12 CERVICAL RADICULOPATHY: Status: ACTIVE | Noted: 2021-06-08

## 2021-06-08 PROBLEM — M54.2 CERVICALGIA: Status: ACTIVE | Noted: 2021-06-08

## 2021-06-09 RX ORDER — VARENICLINE TARTRATE 1 MG/1
TABLET, FILM COATED ORAL
Qty: 60 TABLET | Refills: 0 | Status: SHIPPED | OUTPATIENT
Start: 2021-06-09 | End: 2022-08-17

## 2021-08-10 ENCOUNTER — TELEPHONE (OUTPATIENT)
Dept: FAMILY MEDICINE CLINIC | Age: 46
End: 2021-08-10

## 2021-08-10 NOTE — TELEPHONE ENCOUNTER
Pt was informed insurance will not cover chantix. However pt does want to try nicotine patches. please sent to [facility] on [address]

## 2021-08-27 DIAGNOSIS — N52.9 ED (ERECTILE DYSFUNCTION) OF ORGANIC ORIGIN: Primary | ICD-10-CM

## 2021-08-27 RX ORDER — SILDENAFIL 100 MG/1
TABLET, FILM COATED ORAL
Qty: 30 TABLET | Refills: 1 | Status: SHIPPED | OUTPATIENT
Start: 2021-08-27 | End: 2021-09-01 | Stop reason: SDUPTHER

## 2021-09-01 ENCOUNTER — TELEMEDICINE (OUTPATIENT)
Dept: FAMILY MEDICINE CLINIC | Age: 46
End: 2021-09-01
Payer: COMMERCIAL

## 2021-09-01 DIAGNOSIS — F41.9 ANXIETY: ICD-10-CM

## 2021-09-01 DIAGNOSIS — F31.31 BIPOLAR AFFECTIVE DISORDER, CURRENTLY DEPRESSED, MILD (HCC): ICD-10-CM

## 2021-09-01 DIAGNOSIS — F31.31 BIPOLAR AFFECTIVE DISORDER, CURRENTLY DEPRESSED, MILD (HCC): Primary | ICD-10-CM

## 2021-09-01 DIAGNOSIS — N52.9 ED (ERECTILE DYSFUNCTION) OF ORGANIC ORIGIN: ICD-10-CM

## 2021-09-01 PROCEDURE — 99441 PR PHYS/QHP TELEPHONE EVALUATION 5-10 MIN: CPT | Performed by: NURSE PRACTITIONER

## 2021-09-01 RX ORDER — BUSPIRONE HYDROCHLORIDE 10 MG/1
TABLET ORAL
Qty: 60 TABLET | Refills: 0 | Status: SHIPPED | OUTPATIENT
Start: 2021-09-01 | End: 2022-08-17

## 2021-09-01 RX ORDER — SILDENAFIL 100 MG/1
TABLET, FILM COATED ORAL
Qty: 30 TABLET | Refills: 1 | Status: SHIPPED | OUTPATIENT
Start: 2021-09-01 | End: 2022-01-05 | Stop reason: SDUPTHER

## 2021-09-01 RX ORDER — DEXTRAN 70, GLYCERIN, HYPROMELLOSE 1; 2; 3 MG/ML; MG/ML; MG/ML
SOLUTION/ DROPS OPHTHALMIC
Qty: 15 ML | Refills: 5 | Status: SHIPPED | OUTPATIENT
Start: 2021-09-01 | End: 2022-09-06

## 2021-09-01 RX ORDER — FLUTICASONE PROPIONATE 50 MCG
2 SPRAY, SUSPENSION (ML) NASAL DAILY
Qty: 16 G | Refills: 5 | Status: SHIPPED | OUTPATIENT
Start: 2021-09-01 | End: 2022-08-17

## 2021-09-01 RX ORDER — QUETIAPINE FUMARATE 100 MG/1
100 TABLET, FILM COATED ORAL NIGHTLY
Qty: 30 TABLET | Refills: 0 | Status: SHIPPED | OUTPATIENT
Start: 2021-09-01 | End: 2021-09-10 | Stop reason: SDUPTHER

## 2021-09-01 SDOH — ECONOMIC STABILITY: TRANSPORTATION INSECURITY
IN THE PAST 12 MONTHS, HAS LACK OF TRANSPORTATION KEPT YOU FROM MEETINGS, WORK, OR FROM GETTING THINGS NEEDED FOR DAILY LIVING?: NO

## 2021-09-01 SDOH — ECONOMIC STABILITY: TRANSPORTATION INSECURITY
IN THE PAST 12 MONTHS, HAS THE LACK OF TRANSPORTATION KEPT YOU FROM MEDICAL APPOINTMENTS OR FROM GETTING MEDICATIONS?: NO

## 2021-09-01 SDOH — ECONOMIC STABILITY: FOOD INSECURITY: WITHIN THE PAST 12 MONTHS, YOU WORRIED THAT YOUR FOOD WOULD RUN OUT BEFORE YOU GOT MONEY TO BUY MORE.: NEVER TRUE

## 2021-09-01 SDOH — ECONOMIC STABILITY: FOOD INSECURITY: WITHIN THE PAST 12 MONTHS, THE FOOD YOU BOUGHT JUST DIDN'T LAST AND YOU DIDN'T HAVE MONEY TO GET MORE.: NEVER TRUE

## 2021-09-01 ASSESSMENT — ENCOUNTER SYMPTOMS
COUGH: 0
EYE REDNESS: 0
SHORTNESS OF BREATH: 0
EYE ITCHING: 1
EYE PAIN: 0

## 2021-09-01 ASSESSMENT — SOCIAL DETERMINANTS OF HEALTH (SDOH): HOW HARD IS IT FOR YOU TO PAY FOR THE VERY BASICS LIKE FOOD, HOUSING, MEDICAL CARE, AND HEATING?: NOT HARD AT ALL

## 2021-09-01 NOTE — PROGRESS NOTES
Subjective:      Patient ID: Ron Adams is a 48 y.o. male who presents today for:     Chief Complaint   Patient presents with    Anxiety     Patient presents today for anxiety medication refill. Patient has appointmnet with psych on 09/10/2021. HPI Pt f/u today for anxiety and bipolar. He reports he was stuck somewhere that he didn't have reception and missed psych appt. He ran out of medication and has an appt scheduled but not until the 10th. He reports that he has been irritable and having trouble sleeping without medication. Denies SI. Also would like refill of viagara. Does well with it. No concerns or questions. Reports hx of dry eye. He has bought over the counter eye drops but feels they do not help as much. Past Medical History:   Diagnosis Date    Anxiety     Chronic back pain     Drug abuse ([facility] [address]     pt states he has been clean since March 27,2017.   Heroin abuse       Past Surgical History:   Procedure Laterality Date    SEPTOPLASTY N/A 8/1/2019    SEPTOPLASTY, MICRODEBRIDER ASSISTED TURBINOPLASTY AND OUT FRACTURING BILATERAL NASAL ENDOSCOPY performed by Sugar Castorena MD at [address]       Family History   Problem Relation Age of Onset    No Known Problems Mother     Heart Disease Father         pacemaker    No Known Problems Sister     No Known Problems Brother     No Known Problems Son     No Known Problems Daughter      Social History     Socioeconomic History    Marital status:      Spouse name: Not on file    Number of children: Not on file    Years of education: Not on file    Highest education level: Not on file   Occupational History    Not on file   Tobacco Use    Smoking status: Current Every Day Smoker     Packs/day: 0.50     Years: 27.00     Pack years: 13.50     Types: Cigarettes    Smokeless tobacco: Never Used   Substance and Sexual Activity    Alcohol use: No     Comment: quit 2012    Drug use: Yes     Types: Marijuana    Sexual activity: Yes   Other Topics Concern    Not on file   Social History Narrative    Not on file     Social Determinants of Health     Financial Resource Strain: Low Risk     Difficulty of Paying Living Expenses: Not hard at all   Food Insecurity: No Food Insecurity    Worried About Running Out of Food in the Last Year: Never true    [address] in the Last Year: Never true   Transportation Needs: No Transportation Needs    Lack of Transportation (Medical): No    Lack of Transportation (Non-Medical): No   Physical Activity:     Days of Exercise per Week:     Minutes of Exercise per Session:    Stress:     Feeling of Stress :    Social Connections:     Frequency of Communication with Friends and Family:     Frequency of Social Gatherings with Friends and Family:     Attends Sabianist Services:     Active Member of Clubs or Organizations:     Attends Club or Organization Meetings:     Marital Status:    Intimate Partner Violence:     Fear of Current or Ex-Partner:     Emotionally Abused:     Physically Abused:     Sexually Abused:      Current Outpatient Medications on File Prior to Visit   Medication Sig Dispense Refill    ibuprofen (ADVIL;MOTRIN) 800 MG tablet TAKE 1 TABLET BY MOUTH EVERY 6 HOURS AS NEEDED FOR PAIN      CHANTIX CONTINUING MONTH LEWIS 1 MG tablet Take 1 tablet by mouth twice daily (Patient not taking: Reported on 9/1/2021) 60 tablet 0    albuterol sulfate HFA (PROVENTIL HFA) 108 (90 Base) MCG/ACT inhaler Inhale 2 puffs into the lungs every 6 hours as needed for Wheezing or Shortness of Breath (Patient not taking: Reported on 9/1/2021) 1 Inhaler 0    naproxen (NAPROSYN) 500 MG tablet Take 1 tablet by mouth 2 times daily (with meals) (Patient not taking: Reported on 9/1/2021) 30 tablet 0    Tens Unit MISC by Does not apply route (Patient not taking: Reported on 9/1/2021) 1 each 0     No current facility-administered medications on file prior to visit.        Allergies: Patient has no known allergies. Review of Systems   Constitutional: Negative for chills, fatigue and fever. HENT: Negative for congestion. Eyes: Positive for itching. Negative for pain, redness and visual disturbance. Respiratory: Negative for cough and shortness of breath. Cardiovascular: Negative for chest pain. Allergic/Immunologic: Positive for environmental allergies. Psychiatric/Behavioral: Positive for agitation and sleep disturbance. Negative for self-injury and suicidal ideas. The patient is nervous/anxious. Objective: There were no vitals taken for this visit. Physical Exam  Constitutional:       General: He is awake. He is not in acute distress. Neurological:      Mental Status: He is alert and oriented to person, place, and time. Psychiatric:         Mood and Affect: Mood is anxious. Speech: Speech normal.         Behavior: Behavior is cooperative. Assessment:          Diagnosis Orders   1. Bipolar affective disorder, currently depressed, mild (HCC)  QUEtiapine (SEROQUEL) 100 MG tablet    busPIRone (BUSPAR) 10 MG tablet   2. ED (erectile dysfunction) of organic origin  sildenafil (VIAGRA) 100 MG tablet   3. Anxiety  QUEtiapine (SEROQUEL) 100 MG tablet    busPIRone (BUSPAR) 10 MG tablet       Plan:      No orders of the defined types were placed in this encounter.          Orders Placed This Encounter   Medications    sildenafil (VIAGRA) 100 MG tablet     Sig: TAKE 1 TABLET BY MOUTH AS NEEDED FOR ERECTILE DYSFUNCTION (NO MORE THAN 1 TABLET IN 24 HOURS)     Dispense:  30 tablet     Refill:  1    Artificial Tear Solution (GENTEAL TEARS) 0.1-0.2-0.3 % SOLN     Sig: INSTILL 1 DROP INTO AFFECTED EYE(S) EVERY 4 HOURS     Dispense:  15 mL     Refill:  5    QUEtiapine (SEROQUEL) 100 MG tablet     Sig: Take 1 tablet by mouth nightly TAKE 1 TABLET BY MOUTH TWICE A DAY     Dispense:  30 tablet     Refill:  0    busPIRone (BUSPAR) 10 MG tablet     Sig: TAKE 1 TABLET BY MOUTH TWICE DAILY     Dispense:  60 tablet     Refill:  0    fluticasone (FLONASE) 50 MCG/ACT nasal spray     Si sprays by Each Nostril route daily     Dispense:  16 g     Refill:  5       Return in about 6 months (around 3/1/2022), or if symptoms worsen or fail to improve. Okay for refill of medication. Keep psych follow up. Labs next. Reviewed with the patient: current clinicalstatus, medications, activities and diet. Side effects, adverse effects of the medication prescribedtoday, as well as treatment plan/ rationale and result expectations have been discussedwith the patient who expresses understanding and desires to proceed. Close follow upto evaluate treatment results and for coordination of care. I have reviewedthe patient's medical history in detail and updated the computerized patient record. Erica Reid is a 48 y.o. male evaluated via telephone on 2021. Consent:  He and/or health care decision maker is aware that that he may receive a bill for this telephone service, depending on his insurance coverage, and has provided verbal consent to proceed: Yes        This visit was a telephone encounter. Patient was located at their home. Provider was located at their ___ home or        __X__ office. I affirm this is a Patient Initiated Episode with an Established Patient who has not had a related appointment within my department in the past 7 days or scheduled within the next 24 hours.     Total Time: minutes: 5-10 minutes    Note: not billable if this call serves to triage the patient into an appointment for the relevant concern        Mansi Gama, APRN - CNP

## 2021-09-01 NOTE — PATIENT INSTRUCTIONS

## 2021-09-01 NOTE — TELEPHONE ENCOUNTER
Pharmacy called about the script for Quetiapine 100mg. There are 2 sets of directions. Please clarify.   Thank you

## 2021-09-10 RX ORDER — QUETIAPINE FUMARATE 100 MG/1
100 TABLET, FILM COATED ORAL NIGHTLY
Qty: 30 TABLET | Refills: 0 | Status: SHIPPED | OUTPATIENT
Start: 2021-09-10 | End: 2022-08-17 | Stop reason: SDUPTHER

## 2022-01-02 DIAGNOSIS — N52.9 ED (ERECTILE DYSFUNCTION) OF ORGANIC ORIGIN: ICD-10-CM

## 2022-01-02 NOTE — TELEPHONE ENCOUNTER
----- Message from [employer] sent at 12/30/2021  1:08 PM EST -----  Subject: Refill Request    QUESTIONS  Name of Medication? sildenafil (VIAGRA) 100 MG tablet  Patient-reported dosage and instructions? once a day   How many days do you have left? 0  Preferred Pharmacy? [facility] phone number (if available)? [phone]  ---------------------------------------------------------------------------  --------------  Maryuri EDWARDS  What is the best way for the office to contact you? OK to leave message on   voicemail  Preferred Call Back Phone Number?  [phone]

## 2022-01-05 RX ORDER — SILDENAFIL 100 MG/1
TABLET, FILM COATED ORAL
Qty: 30 TABLET | Refills: 0 | Status: SHIPPED | OUTPATIENT
Start: 2022-01-05 | End: 2022-02-14 | Stop reason: SDUPTHER

## 2022-02-08 ENCOUNTER — HOSPITAL ENCOUNTER (EMERGENCY)
Age: 47
Discharge: HOME | End: 2022-02-08
Payer: COMMERCIAL

## 2022-02-08 ENCOUNTER — APPOINTMENT (OUTPATIENT)
Dept: CT IMAGING | Age: 47
End: 2022-02-08
Payer: COMMERCIAL

## 2022-02-08 VITALS
HEART RATE: 92 BPM | BODY MASS INDEX: 24.87 KG/M2 | SYSTOLIC BLOOD PRESSURE: 127 MMHG | OXYGEN SATURATION: 98 % | DIASTOLIC BLOOD PRESSURE: 72 MMHG | RESPIRATION RATE: 18 BRPM | HEIGHT: 75 IN | WEIGHT: 200 LBS | TEMPERATURE: 98.1 F

## 2022-02-08 DIAGNOSIS — R55 NEAR SYNCOPE: Primary | ICD-10-CM

## 2022-02-08 LAB
ALBUMIN SERPL-MCNC: 4.3 G/DL (ref 3.5–4.6)
ALP BLD-CCNC: 68 U/L (ref 35–104)
ALT SERPL-CCNC: 19 U/L (ref 0–41)
ANION GAP SERPL CALCULATED.3IONS-SCNC: 9 MEQ/L (ref 9–15)
AST SERPL-CCNC: 17 U/L (ref 0–40)
BASOPHILS ABSOLUTE: 0 K/UL (ref 0–0.2)
BASOPHILS RELATIVE PERCENT: 0.5 %
BILIRUB SERPL-MCNC: 0.4 MG/DL (ref 0.2–0.7)
BUN BLDV-MCNC: 17 MG/DL (ref 6–20)
CALCIUM SERPL-MCNC: 8.8 MG/DL (ref 8.5–9.9)
CHLORIDE BLD-SCNC: 104 MEQ/L (ref 95–107)
CO2: 25 MEQ/L (ref 20–31)
CREAT SERPL-MCNC: 1.08 MG/DL (ref 0.7–1.2)
D DIMER: <0.27 MG/L FEU (ref 0–0.5)
EOSINOPHILS ABSOLUTE: 0 K/UL (ref 0–0.7)
EOSINOPHILS RELATIVE PERCENT: 0.3 %
ETHANOL PERCENT: NORMAL G/DL
ETHANOL: <10 MG/DL (ref 0–0.08)
GFR AFRICAN AMERICAN: >60
GFR NON-AFRICAN AMERICAN: >60
GLOBULIN: 1.7 G/DL (ref 2.3–3.5)
GLUCOSE BLD-MCNC: 107 MG/DL (ref 70–99)
HCT VFR BLD CALC: 41.8 % (ref 42–52)
HEMOGLOBIN: 14.3 G/DL (ref 14–18)
LYMPHOCYTES ABSOLUTE: 1 K/UL (ref 1–4.8)
LYMPHOCYTES RELATIVE PERCENT: 10.5 %
MAGNESIUM: 2.1 MG/DL (ref 1.7–2.4)
MCH RBC QN AUTO: 31.2 PG (ref 27–31.3)
MCHC RBC AUTO-ENTMCNC: 34.2 % (ref 33–37)
MCV RBC AUTO: 91.2 FL (ref 80–100)
MONOCYTES ABSOLUTE: 0.5 K/UL (ref 0.2–0.8)
MONOCYTES RELATIVE PERCENT: 4.8 %
NEUTROPHILS ABSOLUTE: 8.3 K/UL (ref 1.4–6.5)
NEUTROPHILS RELATIVE PERCENT: 83.9 %
PDW BLD-RTO: 13 % (ref 11.5–14.5)
PLATELET # BLD: 187 K/UL (ref 130–400)
POTASSIUM SERPL-SCNC: 4.7 MEQ/L (ref 3.4–4.9)
RBC # BLD: 4.58 M/UL (ref 4.7–6.1)
SARS-COV-2, NAAT: NOT DETECTED
SODIUM BLD-SCNC: 138 MEQ/L (ref 135–144)
TOTAL CK: 109 U/L (ref 0–190)
TOTAL PROTEIN: 6 G/DL (ref 6.3–8)
TROPONIN: <0.01 NG/ML (ref 0–0.01)
WBC # BLD: 9.9 K/UL (ref 4.8–10.8)

## 2022-02-08 PROCEDURE — 99284 EMERGENCY DEPT VISIT MOD MDM: CPT

## 2022-02-08 PROCEDURE — 93005 ELECTROCARDIOGRAM TRACING: CPT | Performed by: EMERGENCY MEDICINE

## 2022-02-08 PROCEDURE — 82550 ASSAY OF CK (CPK): CPT

## 2022-02-08 PROCEDURE — 82077 ASSAY SPEC XCP UR&BREATH IA: CPT

## 2022-02-08 PROCEDURE — 2580000003 HC RX 258

## 2022-02-08 PROCEDURE — 96360 HYDRATION IV INFUSION INIT: CPT

## 2022-02-08 PROCEDURE — 87635 SARS-COV-2 COVID-19 AMP PRB: CPT

## 2022-02-08 PROCEDURE — 70450 CT HEAD/BRAIN W/O DYE: CPT

## 2022-02-08 PROCEDURE — 72125 CT NECK SPINE W/O DYE: CPT

## 2022-02-08 PROCEDURE — 85379 FIBRIN DEGRADATION QUANT: CPT

## 2022-02-08 PROCEDURE — 84484 ASSAY OF TROPONIN QUANT: CPT

## 2022-02-08 PROCEDURE — 83735 ASSAY OF MAGNESIUM: CPT

## 2022-02-08 PROCEDURE — 85025 COMPLETE CBC W/AUTO DIFF WBC: CPT

## 2022-02-08 PROCEDURE — 80053 COMPREHEN METABOLIC PANEL: CPT

## 2022-02-08 PROCEDURE — 96361 HYDRATE IV INFUSION ADD-ON: CPT

## 2022-02-08 PROCEDURE — 36415 COLL VENOUS BLD VENIPUNCTURE: CPT

## 2022-02-08 RX ORDER — 0.9 % SODIUM CHLORIDE 0.9 %
1000 INTRAVENOUS SOLUTION INTRAVENOUS ONCE
Status: COMPLETED | OUTPATIENT
Start: 2022-02-08 | End: 2022-02-08

## 2022-02-08 RX ADMIN — SODIUM CHLORIDE 1000 ML: 9 INJECTION, SOLUTION INTRAVENOUS at 03:26

## 2022-02-08 ASSESSMENT — ENCOUNTER SYMPTOMS
PHOTOPHOBIA: 0
COUGH: 0
DIARRHEA: 0
BACK PAIN: 0
VOMITING: 0
SHORTNESS OF BREATH: 0
NAUSEA: 0
ABDOMINAL PAIN: 0

## 2022-02-08 NOTE — ED TRIAGE NOTES
PT STATES HE WAS WALKING UPSTAIRS WHEN HE BECAME \"WARM, FUZZY, AND DIZZY\" AND HAD A SYNCOPAL EPISODE, PT WIFE CALLED EMS, PT DIAPHORETIC UPON EMS ARRIVAL, PT STATED HE DID NOT HIT HIS HEAD, PT DENIES SICK CONTACTS, PT ADMITS TO ONE SIMILAR EPISODE A WEEK AGO, PT STATES HE VOMITED X1 AFTER BOTH EPISODES

## 2022-02-08 NOTE — ED NOTES
PT GIVEN URINAL TO COLLECT SPECIMEN. PT AGREES, WILL CALL THIS RN WHEN DONE.      Teodoro Salinas RN  02/08/22 2507

## 2022-02-08 NOTE — ED NOTES
Bed: 12  Expected date: 2/8/22  Expected time: 2:35 AM  Means of arrival: Ambulance  Comments:  50year old male syncopal episode x2   80/50  99/64 after 500 cc ns bolus        Elizabte Weiss RN  02/08/22 8513

## 2022-02-08 NOTE — ED PROVIDER NOTES
[address] [facility] ED  eMERGENCY dEPARTMENT eNCOUnter      Pt Name: Mak Ontiveros  MRN: [id number]  [place] 1975  Date of evaluation: 2/8/2022  Provider: INDIA Carlson        HISTORY OF PRESENT ILLNESS    Mak Ontiveros is a 50 y.o. male per chart review has ah/o tobacco use, anxiety, heroin use, headaches, bipolar affective disorder. Presents today following syncopal episode. Patient states exact same episode happened 1 week ago. Patient unsure if head impact, denies blood thinner use. Patient reported to be going up the stairs when he fell. Denies other injury or pain at this time. He reports some residual dizziness. Reported to have 1 episode of vomiting following incident. Denies neck pain, back pain, nausea, blurred vision, numbness, weakness, headache. Patient reported to have low blood pressure 80/50 with EMS given IV NS bolus with response in blood pressure. When asked if he has had decreased oral intake recently, patient states \"I do not know. \"  When patient asked about drug use, states \"not really. \"  No further specifics provided. Patient observed to be laughing inappropriately and becoming defensive when asked questions. Patient wife presents to the ED, states she witnessed the event, states patient did not fall, he lowered himself to the ground with patient's wife support. Wife states similar episode happened last week, states both times patient appeared to become diaphoretic, lightheaded, dizzy at which time he lowered himself to the ground. REVIEW OF SYSTEMS       Review of Systems   Constitutional: Positive for diaphoresis. Negative for activity change, appetite change, chills and fever. HENT: Negative for congestion. Eyes: Negative for photophobia and visual disturbance. Respiratory: Negative for cough and shortness of breath. Cardiovascular: Negative for chest pain and palpitations.    Gastrointestinal: Negative for abdominal pain, diarrhea, nausea and vomiting. Genitourinary: Negative for difficulty urinating. Musculoskeletal: Negative for arthralgias, back pain, gait problem, myalgias, neck pain and neck stiffness. Skin: Negative for wound. Neurological: Positive for dizziness and light-headedness. Negative for tremors, seizures, syncope, facial asymmetry, speech difficulty, weakness, numbness and headaches. Psychiatric/Behavioral: Negative for confusion. Except as noted above the remainder of the review of systems was reviewed and negative. PAST MEDICAL HISTORY     Past Medical History:   Diagnosis Date    Anxiety     Chronic back pain     Drug abuse ([address]     pt states he has been clean since March 27,2017.   Heroin abuse           SURGICAL HISTORY       Past Surgical History:   Procedure Laterality Date    SEPTOPLASTY N/A 8/1/2019    SEPTOPLASTY, MICRODEBRIDER ASSISTED TURBINOPLASTY AND OUT FRACTURING BILATERAL NASAL ENDOSCOPY performed by Paulette Block MD at [address] [place] [address]       Previous Medications    ALBUTEROL SULFATE HFA (PROVENTIL HFA) 108 (90 BASE) MCG/ACT INHALER    Inhale 2 puffs into the lungs every 6 hours as needed for Wheezing or Shortness of Breath    ARTIFICIAL TEAR SOLUTION (GENTEAL TEARS) 0.1-0.2-0.3 % SOLN    INSTILL 1 DROP INTO AFFECTED EYE(S) EVERY 4 HOURS    BUSPIRONE (BUSPAR) 10 MG TABLET    TAKE 1 TABLET BY MOUTH TWICE DAILY    CHANTIX CONTINUING MONTH LEWIS 1 MG TABLET    Take 1 tablet by mouth twice daily    FLUTICASONE (FLONASE) 50 MCG/ACT NASAL SPRAY    2 sprays by Each Nostril route daily    IBUPROFEN (ADVIL;MOTRIN) 800 MG TABLET    TAKE 1 TABLET BY MOUTH EVERY 6 HOURS AS NEEDED FOR PAIN    NAPROXEN (NAPROSYN) 500 MG TABLET    Take 1 tablet by mouth 2 times daily (with meals)    QUETIAPINE (SEROQUEL) 100 MG TABLET    Take 1 tablet by mouth nightly    SILDENAFIL (VIAGRA) 100 MG TABLET    TAKE 1 TABLET BY MOUTH AS NEEDED FOR ERECTILE DYSFUNCTION (NO MORE THAN 1 TABLET IN 24 HOURS)    TENS UNIT MISC    by Does not apply route       ALLERGIES     Patient has no known allergies. FAMILY HISTORY       Family History   Problem Relation Age of Onset    No Known Problems Mother     Heart Disease Father         pacemaker    No Known Problems Sister     No Known Problems Brother     No Known Problems Son     No Known Problems Daughter           SOCIAL HISTORY       Social History     Socioeconomic History    Marital status:      Spouse name: None    Number of children: None    Years of education: None    Highest education level: None   Occupational History    None   Tobacco Use    Smoking status: Current Every Day Smoker     Packs/day: 0.50     Years: 27.00     Pack years: 13.50     Types: Cigarettes    Smokeless tobacco: Never Used   Substance and Sexual Activity    Alcohol use: No     Comment: quit 2012    Drug use: Yes     Types: Marijuana Tamiko Heys)    Sexual activity: Yes   Other Topics Concern    None   Social History Narrative    None     Social Determinants of Health     Financial Resource Strain: Low Risk     Difficulty of Paying Living Expenses: Not hard at all   Food Insecurity: No Food Insecurity    Worried About Running Out of Food in the Last Year: Never true    Jen of Food in the Last Year: Never true   Transportation Needs: No Transportation Needs    Lack of Transportation (Medical): No    Lack of Transportation (Non-Medical):  No   Physical Activity:     Days of Exercise per Week: Not on file    Minutes of Exercise per Session: Not on file   Stress:     Feeling of Stress : Not on file   Social Connections:     Frequency of Communication with Friends and Family: Not on file    Frequency of Social Gatherings with Friends and Family: Not on file    Attends Quaker Services: Not on file    Active Member of Clubs or Organizations: Not on file    Attends Club or Organization Meetings: Not on file    Marital Status: Not on file   Intimate Partner Violence:     Fear of Current or Ex-Partner: Not on file    Emotionally Abused: Not on file    Physically Abused: Not on file    Sexually Abused: Not on file   Housing Stability:     Unable to Pay for Housing in the Last Year: Not on file    Number of [place] in the Last Year: Not on file    Unstable Housing in the Last Year: Not on file         PHYSICAL EXAM        ED Triage Vitals [02/08/22 0244]   BP Temp Temp Source Pulse Resp SpO2 Height Weight   109/75 98.1 °F (36.7 °C) Oral 75 16 98 % 6' 3\" (1.905 m) 200 lb (90.7 kg)       Physical Exam  Constitutional:       General: He is not in acute distress. Appearance: Normal appearance. He is not ill-appearing, toxic-appearing or diaphoretic. HENT:      Head: Normocephalic and atraumatic. Right Ear: Tympanic membrane, ear canal and external ear normal.      Left Ear: Tympanic membrane, ear canal and external ear normal.      Nose: Nose normal. No congestion or rhinorrhea. Mouth/Throat:      Mouth: Mucous membranes are moist.      Pharynx: Oropharynx is clear. No oropharyngeal exudate or posterior oropharyngeal erythema. Eyes:      Extraocular Movements: Extraocular movements intact. Conjunctiva/sclera: Conjunctivae normal.      Pupils: Pupils are equal, round, and reactive to light. Comments: No nystagmus, no pain with EOM   Neck:      Comments: No tenderness to midline spine C/T/L  Cardiovascular:      Rate and Rhythm: Normal rate and regular rhythm. Pulses: Normal pulses. Pulmonary:      Effort: Pulmonary effort is normal. No respiratory distress. Breath sounds: Normal breath sounds. No wheezing, rhonchi or rales. Chest:      Chest wall: No tenderness. Abdominal:      General: Bowel sounds are normal. There is no distension. Palpations: Abdomen is soft. Tenderness: There is no abdominal tenderness. There is no right CVA tenderness, left CVA tenderness or guarding.    Musculoskeletal: General: No swelling, tenderness, deformity or signs of injury. Normal range of motion. Cervical back: Normal range of motion. No rigidity or tenderness. Skin:     General: Skin is warm. Findings: No bruising or lesion. Neurological:      Mental Status: He is alert and oriented to person, place, and time. GCS: GCS eye subscore is 4. GCS verbal subscore is 5. GCS motor subscore is 6. Cranial Nerves: Cranial nerves are intact. Sensory: Sensation is intact. Motor: Motor function is intact. Coordination: Coordination is intact. Gait: Gait is intact. Psychiatric:         Mood and Affect: Mood normal.         Behavior: Behavior normal.           LABS:  Labs Reviewed   COMPREHENSIVE METABOLIC PANEL - Abnormal; Notable for the following components:       Result Value    Glucose 107 (*)     Total Protein 6.0 (*)     Globulin 1.7 (*)     All other components within normal limits   CBC WITH AUTO DIFFERENTIAL - Abnormal; Notable for the following components:    RBC 4.58 (*)     Hematocrit 41.8 (*)     Neutrophils Absolute 8.3 (*)     All other components within normal limits   COVID-19, RAPID   ETHANOL   MAGNESIUM   TROPONIN   D-DIMER, QUANTITATIVE   CK   URINE DRUG SCREEN         MDM:   Vitals:    Vitals:    02/08/22 0244 02/08/22 0400 02/08/22 0418   BP: 109/75 127/72    Pulse: 75 92    Resp: 16 18 18   Temp: 98.1 °F (36.7 °C)     TempSrc: Oral     SpO2: 98% 96% 98%   Weight: 200 lb (90.7 kg)     Height: 6' 3\" (1.905 m)         13-year-old male patient presents to the ED with 2 episodes of dizziness lightheadedness in the past week. Patient initially reported by EMS to have syncope and collapse, however when wife presents to the ED denies the patient fell or completely passed out, states he was noted to become diaphoretic, nauseous, dizzy, with decreased responsiveness during both episodes. Patient is afebrile, hemodynamically stable.   Physical examination is unremarkable for any focal neurologic deficits. At time of presentation patient only reporting mild residual dizziness. Patient is not clinically dehydrated. Creatinine is within his baseline. EKG normal sinus rhythm heart rate 73, regular intervals, incomplete right bundle branch block, no acute ST elevation. Negative orthostatic vital signs. Negative Covid test.  Negative D-dimer. No troponin or CK elevation. No lactic acid elevation, no history of seizures, no reported seizure-like activity. Negative head CT. Patient hydrated with 2.5L total IV NS bolus between EMS and this ED, prompted several times by this PA and RN for urine sample. Patient refuses to provide urine. At this time patient states that he feels well, patient and wife asking when he can leave. Offered neurologist referral, wife declines, states patient will follow up with primary care. Discussed strict return precautions. Patient remains without deficit, is at his baseline, he is stable for discharge. He ambulates from ED without difficulty. CRITICAL CARE TIME   Total CriticalCare time was 0 minutes, excluding separately reportable procedures. There was a high probability of clinically significant/life threatening deterioration in the patient's condition which required my urgent intervention. PROCEDURES:  Unlessotherwise noted below, none      Procedures      FINAL IMPRESSION      1.  Near syncope          DISPOSITION/PLAN   DISPOSITION Decision To Discharge 02/08/2022 04:55:40 AM          INDIA Mcdonald (electronically signed)  Attending Emergency Physician          Gigi Mcdonald  02/12/22 4354

## 2022-02-08 NOTE — ED NOTES
PT BACK FROM CT. PROVIDED WITH CRACKERS, WATER, AND WARM BLANKETS PER REQUEST. PT GIVEN REMINDED ABOUT NEED FOR SPECIMEN.       Milagro Barnes RN  02/08/22 7217

## 2022-02-08 NOTE — ED NOTES
CALL LIGHT WITHIN REACH, BED IN LOWEST POSITION. SIGNIFICANT OTHER BEDSIDE. PT REMINDED ABOUT NEED FOR URINE.      Patrice Fontaine RN  02/08/22 3054

## 2022-02-10 LAB
EKG ATRIAL RATE: 73 BPM
EKG P AXIS: 54 DEGREES
EKG P-R INTERVAL: 184 MS
EKG Q-T INTERVAL: 392 MS
EKG QRS DURATION: 96 MS
EKG QTC CALCULATION (BAZETT): 431 MS
EKG R AXIS: 61 DEGREES
EKG T AXIS: 20 DEGREES
EKG VENTRICULAR RATE: 73 BPM

## 2022-02-10 PROCEDURE — 93010 ELECTROCARDIOGRAM REPORT: CPT | Performed by: INTERNAL MEDICINE

## 2022-02-14 ENCOUNTER — OFFICE VISIT (OUTPATIENT)
Dept: FAMILY MEDICINE CLINIC | Age: 47
End: 2022-02-14
Payer: COMMERCIAL

## 2022-02-14 VITALS
HEIGHT: 75 IN | HEART RATE: 79 BPM | DIASTOLIC BLOOD PRESSURE: 80 MMHG | SYSTOLIC BLOOD PRESSURE: 130 MMHG | RESPIRATION RATE: 16 BRPM | TEMPERATURE: 97.6 F | OXYGEN SATURATION: 97 % | BODY MASS INDEX: 24.87 KG/M2 | WEIGHT: 200 LBS

## 2022-02-14 DIAGNOSIS — F31.31 BIPOLAR AFFECTIVE DISORDER, CURRENTLY DEPRESSED, MILD (HCC): ICD-10-CM

## 2022-02-14 DIAGNOSIS — Z76.0 MEDICATION REFILL: ICD-10-CM

## 2022-02-14 DIAGNOSIS — R07.89 CHEST TIGHTNESS: ICD-10-CM

## 2022-02-14 DIAGNOSIS — R55 SYNCOPE, UNSPECIFIED SYNCOPE TYPE: Primary | ICD-10-CM

## 2022-02-14 DIAGNOSIS — R55 SYNCOPE, UNSPECIFIED SYNCOPE TYPE: ICD-10-CM

## 2022-02-14 PROCEDURE — G8484 FLU IMMUNIZE NO ADMIN: HCPCS | Performed by: FAMILY MEDICINE

## 2022-02-14 PROCEDURE — G8427 DOCREV CUR MEDS BY ELIG CLIN: HCPCS | Performed by: FAMILY MEDICINE

## 2022-02-14 PROCEDURE — 99214 OFFICE O/P EST MOD 30 MIN: CPT | Performed by: FAMILY MEDICINE

## 2022-02-14 PROCEDURE — 4004F PT TOBACCO SCREEN RCVD TLK: CPT | Performed by: FAMILY MEDICINE

## 2022-02-14 PROCEDURE — G8417 CALC BMI ABV UP PARAM F/U: HCPCS | Performed by: FAMILY MEDICINE

## 2022-02-14 RX ORDER — IBUPROFEN 800 MG/1
TABLET ORAL
Qty: 120 TABLET | Refills: 0 | Status: SHIPPED | OUTPATIENT
Start: 2022-02-14 | End: 2022-05-08

## 2022-02-14 RX ORDER — SILDENAFIL 100 MG/1
TABLET, FILM COATED ORAL
Qty: 30 TABLET | Refills: 0 | Status: SHIPPED | OUTPATIENT
Start: 2022-02-14 | End: 2022-05-08

## 2022-02-14 RX ORDER — VARENICLINE TARTRATE 1 MG/1
TABLET, FILM COATED ORAL
Qty: 60 TABLET | Refills: 0 | Status: CANCELLED | OUTPATIENT
Start: 2022-02-14

## 2022-02-14 RX ORDER — NAPROXEN 500 MG/1
500 TABLET ORAL 2 TIMES DAILY WITH MEALS
Qty: 30 TABLET | Refills: 0 | Status: CANCELLED | OUTPATIENT
Start: 2022-02-14

## 2022-02-14 ASSESSMENT — ENCOUNTER SYMPTOMS
COUGH: 0
DIARRHEA: 0
VOMITING: 0

## 2022-02-14 NOTE — PROGRESS NOTES
Subjective:      Patient ID: Abilio Proctor is a 50 y.o. male    Loss of Consciousness  This is a recurrent problem. The current episode started 1 to 4 weeks ago. Associated symptoms include chest pain. Pertinent negatives include no fever or vomiting. Chest Pain   Associated symptoms include syncope. Pertinent negatives include no cough, fever or vomiting. Here with 2 episodes of syncope over the last 2 weeks. Was standing -felt some dizziness and flushing then went to floor. Smokes marijuana but no other illegal substances. Was not drinking alcohol during this time. Denies palpitations or sob. Did feel some chest tightness prior to 2nd passing out episode. Does use marijuana but no other illegal drugs. .  Is seeing psychiatrist and being treated thru psychiatry currently. No recent illness. Review of Systems   Constitutional: Negative for chills and fever. Respiratory: Negative for cough. Cardiovascular: Positive for chest pain and syncope. Gastrointestinal: Negative for diarrhea and vomiting.      Reviewed allergy, medical, social, surgical, family and med list changes and updated   Files     Social History     Socioeconomic History    Marital status:      Spouse name: Not on file    Number of children: Not on file    Years of education: Not on file    Highest education level: Not on file   Occupational History    Not on file   Tobacco Use    Smoking status: Current Every Day Smoker     Packs/day: 0.50     Years: 27.00     Pack years: 13.50     Types: Cigarettes    Smokeless tobacco: Never Used   Substance and Sexual Activity    Alcohol use: No     Comment: quit 2012    Drug use: Yes     Types: Marijuana Rfancia Peals)    Sexual activity: Yes   Other Topics Concern    Not on file   Social History Narrative    Not on file     Social Determinants of Health     Financial Resource Strain: Low Risk     Difficulty of Paying Living Expenses: Not hard at all   Food Insecurity: No Food Insecurity    Worried About Running Out of Food in the Last Year: Never true    Jen of Food in the Last Year: Never true   Transportation Needs: No Transportation Needs    Lack of Transportation (Medical): No    Lack of Transportation (Non-Medical):  No   Physical Activity:     Days of Exercise per Week: Not on file    Minutes of Exercise per Session: Not on file   Stress:     Feeling of Stress : Not on file   Social Connections:     Frequency of Communication with Friends and Family: Not on file    Frequency of Social Gatherings with Friends and Family: Not on file    Attends Spiritism Services: Not on file    Active Member of [address] [place] [address] or Organizations: Not on file    Attends Club or Organization Meetings: Not on file    Marital Status: Not on file   Intimate Partner Violence:     Fear of Current or Ex-Partner: Not on file    Emotionally Abused: Not on file    Physically Abused: Not on file    Sexually Abused: Not on file   Housing Stability:     Unable to Pay for Housing in the Last Year: Not on file    Number of [place] in the Last Year: Not on file    Unstable Housing in the Last Year: Not on file     Current Outpatient Medications   Medication Sig Dispense Refill    sildenafil (VIAGRA) 100 MG tablet TAKE 1 TABLET BY MOUTH AS NEEDED FOR ERECTILE DYSFUNCTION (NO MORE THAN 1 TABLET IN 24 HOURS) 30 tablet 0    QUEtiapine (SEROQUEL) 100 MG tablet Take 1 tablet by mouth nightly 30 tablet 0    Artificial Tear Solution (GENTEAL TEARS) 0.1-0.2-0.3 % SOLN INSTILL 1 DROP INTO AFFECTED EYE(S) EVERY 4 HOURS 15 mL 5    busPIRone (BUSPAR) 10 MG tablet TAKE 1 TABLET BY MOUTH TWICE DAILY 60 tablet 0    fluticasone (FLONASE) 50 MCG/ACT nasal spray 2 sprays by Each Nostril route daily 16 g 5    CHANTIX CONTINUING MONTH LEWIS 1 MG tablet Take 1 tablet by mouth twice daily 60 tablet 0    albuterol sulfate HFA (PROVENTIL HFA) 108 (90 Base) MCG/ACT inhaler Inhale 2 puffs into the lungs every 6 hours as needed for Wheezing or Shortness of Breath 1 Inhaler 0    naproxen (NAPROSYN) 500 MG tablet Take 1 tablet by mouth 2 times daily (with meals) 30 tablet 0    ibuprofen (ADVIL;MOTRIN) 800 MG tablet TAKE 1 TABLET BY MOUTH EVERY 6 HOURS AS NEEDED FOR PAIN       No current facility-administered medications for this visit. 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 Heroin abuse       Objective:   /80   Pulse 79   Temp 97.6 °F (36.4 °C)   Resp 16   Ht 6' 3\" (1.905 m)   Wt 200 lb (90.7 kg)   SpO2 97%   BMI 25.00 kg/m²     Physical Exam  Heent: T.M's normal Nares patent. EOM'S intact. Pupillary reaction directly and                Consensually to light normal.  No photophobia. Tongue mid line. No facial               Asymmetry. Neck:   No rigidity. No masses. No thyroid asymmetry. No bruits  Lungs:  Clear equal breath sounds bilat. No wheezes or rales   Heart:   Rate reg. No murmur  Neuro:  C.N 2-12 intact. No focal deficits. Cerebellar function intact  Assessment:       Diagnosis Orders   1. Syncope, unspecified syncope type  MRI BRAIN WO CONTRAST    ECHO Complete 2D W Doppler W Color    Holter Monitor 24 Hour    NM MYOCARDIAL SPECT SINGLE    Pain Management Drug Screen   2. Chest tightness  ECHO Complete 2D W Doppler W Color    Holter Monitor 24 Hour    NM MYOCARDIAL SPECT SINGLE   3. Bipolar affective disorder, currently depressed, mild ([facility] [address]     4.  Medication refill           Plan:    bipolar disorder fairly stable and followed by psychiatry   Orders Placed This Encounter   Procedures   [facility] CONTRAST     Standing Status:   Future     Standing Expiration Date:   2/14/2023    NM MYOCARDIAL SPECT SINGLE     Standing Status:   Future     Standing Expiration Date:   2/14/2023    Pain Management Drug Screen     Standing Status:   Future     Standing Expiration Date:   2/14/2023    Holter Monitor 24 Hour     Standing Status:   Future     Standing Expiration Date:   2/14/2023     Order Specific Question:   Reason for Exam?     Answer:   Syncope    ECHO Complete 2D W Doppler W Color     Standing Status:   Future     Standing Expiration Date:   2/14/2023     Order Specific Question:   Reason for exam:     Answer:   syncope     Orders Placed This Encounter   Medications    sildenafil (VIAGRA) 100 MG tablet     Sig: TAKE 1 TABLET BY MOUTH AS NEEDED FOR ERECTILE DYSFUNCTION (NO MORE THAN 1 TABLET IN 24 HOURS)     Dispense:  30 tablet     Refill:  0    ibuprofen (ADVIL;MOTRIN) 800 MG tablet     Sig: TAKE 1 TABLET BY MOUTH EVERY 6 HOURS AS NEEDED FOR PAIN     Dispense:  120 tablet     Refill:  0   f/u after above done

## 2022-02-18 LAB
6-ACETYLMORPHINE: NOT DETECTED
7-AMINOCLONAZEPAM: NOT DETECTED
ALPHA-OH-ALPRAZOLAM: NOT DETECTED
ALPHA-OH-MIDAZOLAM, URINE: NOT DETECTED
ALPRAZOLAM: NOT DETECTED
AMPHETAMINE: NOT DETECTED
BARBITURATES: NOT DETECTED
BENZOYLECGONINE: PRESENT
BUPRENORPHINE: NOT DETECTED
CARISOPRODOL: NOT DETECTED
CLONAZEPAM: NOT DETECTED
CODEINE: NOT DETECTED
CREATININE URINE: 41.7 MG/DL (ref 20–400)
DIAZEPAM: NOT DETECTED
EER PAIN MGT DRUG PANEL, HIGH RES/EMIT U: NORMAL
ETHYL GLUCURONIDE: NOT DETECTED
FENTANYL: NOT DETECTED
GABAPENTIN: NOT DETECTED
HYDROCODONE: NOT DETECTED
HYDROMORPHONE: NOT DETECTED
LORAZEPAM: NOT DETECTED
MARIJUANA METABOLITE: PRESENT
MDA: NOT DETECTED
MDEA: NOT DETECTED
MDMA URINE: NOT DETECTED
MEPERIDINE: NOT DETECTED
METHADONE: NOT DETECTED
METHAMPHETAMINE: NOT DETECTED
METHYLPHENIDATE: NOT DETECTED
MIDAZOLAM: NOT DETECTED
MORPHINE: NOT DETECTED
NALOXONE: NOT DETECTED
NORBUPRENORPHINE, FREE: NOT DETECTED
NORDIAZEPAM: NOT DETECTED
NORFENTANYL: NOT DETECTED
NORHYDROCODONE, URINE: NOT DETECTED
NOROXYCODONE: NOT DETECTED
NOROXYMORPHONE, URINE: NOT DETECTED
OXAZEPAM: NOT DETECTED
OXYCODONE: NOT DETECTED
OXYMORPHONE: NOT DETECTED
PAIN MANAGEMENT DRUG PANEL: NORMAL
PCP: NOT DETECTED
PHENTERMINE: NOT DETECTED
PREGABALIN: NOT DETECTED
TAPENTADOL, URINE: NOT DETECTED
TAPENTADOL-O-SULFATE, URINE: NOT DETECTED
TEMAZEPAM: NOT DETECTED
TRAMADOL: NOT DETECTED
ZOLPIDEM: NOT DETECTED

## 2022-05-08 RX ORDER — SILDENAFIL 100 MG/1
TABLET, FILM COATED ORAL
Qty: 30 TABLET | Refills: 0 | Status: SHIPPED | OUTPATIENT
Start: 2022-05-08 | End: 2022-07-06

## 2022-05-08 RX ORDER — IBUPROFEN 800 MG/1
TABLET ORAL
Qty: 120 TABLET | Refills: 0 | Status: SHIPPED | OUTPATIENT
Start: 2022-05-08 | End: 2022-06-19

## 2022-06-19 RX ORDER — IBUPROFEN 800 MG/1
TABLET ORAL
Qty: 120 TABLET | Refills: 0 | Status: SHIPPED | OUTPATIENT
Start: 2022-06-19 | End: 2022-08-17 | Stop reason: SDUPTHER

## 2022-07-06 RX ORDER — SILDENAFIL 100 MG/1
TABLET, FILM COATED ORAL
Qty: 30 TABLET | Refills: 0 | Status: SHIPPED | OUTPATIENT
Start: 2022-07-06 | End: 2022-08-17 | Stop reason: SDUPTHER

## 2022-08-17 ENCOUNTER — OFFICE VISIT (OUTPATIENT)
Dept: FAMILY MEDICINE CLINIC | Age: 47
End: 2022-08-17
Payer: COMMERCIAL

## 2022-08-17 VITALS
OXYGEN SATURATION: 98 % | HEART RATE: 67 BPM | WEIGHT: 213 LBS | SYSTOLIC BLOOD PRESSURE: 120 MMHG | BODY MASS INDEX: 26.49 KG/M2 | RESPIRATION RATE: 16 BRPM | DIASTOLIC BLOOD PRESSURE: 82 MMHG | TEMPERATURE: 97.8 F | HEIGHT: 75 IN

## 2022-08-17 DIAGNOSIS — F31.71 BIPOLAR DISORDER, IN PARTIAL REMISSION, MOST RECENT EPISODE HYPOMANIC (HCC): Primary | ICD-10-CM

## 2022-08-17 DIAGNOSIS — Z00.00 HEALTH CARE MAINTENANCE: ICD-10-CM

## 2022-08-17 DIAGNOSIS — H93.13 BILATERAL TINNITUS: ICD-10-CM

## 2022-08-17 DIAGNOSIS — N52.9 ED (ERECTILE DYSFUNCTION) OF ORGANIC ORIGIN: ICD-10-CM

## 2022-08-17 DIAGNOSIS — M17.0 ARTHRITIS OF BOTH KNEES: ICD-10-CM

## 2022-08-17 PROCEDURE — 99214 OFFICE O/P EST MOD 30 MIN: CPT | Performed by: FAMILY MEDICINE

## 2022-08-17 PROCEDURE — G8417 CALC BMI ABV UP PARAM F/U: HCPCS | Performed by: FAMILY MEDICINE

## 2022-08-17 PROCEDURE — G8427 DOCREV CUR MEDS BY ELIG CLIN: HCPCS | Performed by: FAMILY MEDICINE

## 2022-08-17 PROCEDURE — 4004F PT TOBACCO SCREEN RCVD TLK: CPT | Performed by: FAMILY MEDICINE

## 2022-08-17 RX ORDER — QUETIAPINE FUMARATE 100 MG/1
100 TABLET, FILM COATED ORAL NIGHTLY
Qty: 30 TABLET | Refills: 0 | Status: SHIPPED | OUTPATIENT
Start: 2022-08-17

## 2022-08-17 RX ORDER — IBUPROFEN 800 MG/1
TABLET ORAL
Qty: 90 TABLET | Refills: 0 | Status: SHIPPED | OUTPATIENT
Start: 2022-08-17 | End: 2022-09-30

## 2022-08-17 RX ORDER — BUSPIRONE HYDROCHLORIDE 15 MG/1
TABLET ORAL
Qty: 90 TABLET | Refills: 0 | Status: SHIPPED | OUTPATIENT
Start: 2022-08-17

## 2022-08-17 RX ORDER — SILDENAFIL 100 MG/1
TABLET, FILM COATED ORAL
Qty: 30 TABLET | Refills: 0 | Status: SHIPPED | OUTPATIENT
Start: 2022-08-17

## 2022-08-17 RX ORDER — BUSPIRONE HYDROCHLORIDE 15 MG/1
TABLET ORAL
COMMUNITY
Start: 2022-08-05 | End: 2022-08-17 | Stop reason: SDUPTHER

## 2022-08-17 RX ORDER — ALBUTEROL SULFATE 90 UG/1
2 AEROSOL, METERED RESPIRATORY (INHALATION) EVERY 6 HOURS PRN
Qty: 1 EACH | Refills: 0 | Status: SHIPPED | OUTPATIENT
Start: 2022-08-17 | End: 2022-09-30

## 2022-08-17 ASSESSMENT — ENCOUNTER SYMPTOMS
DIARRHEA: 0
COUGH: 0
VOMITING: 0

## 2022-08-17 NOTE — PROGRESS NOTES
Subjective:      Patient ID: Yvette Husbands is a 50 y.o. male    Mental Health Problem  This is a chronic problem. The onset of the illness is precipitated by emotional stress. Other  This is a chronic problem. Pertinent negatives include no coughing, fever, rash, vomiting or weakness. Here in follow up of sorts. Does need new psychiatrist -previous one and requesting refill on seroquel and buspar as running out. Requesting refill on viagra which works well for ed and also motrin which he takes on occasion for knee arthritis. Did not get testing done which was ordered earlier this year for syncope. Admits did not go get it done. Has not had any further episodes of syncope. Has had ringing in ears recently. No ear pain or ear drainage    Review of Systems   Constitutional:  Negative for fever. Respiratory:  Negative for cough. Gastrointestinal:  Negative for diarrhea and vomiting. Skin:  Negative for rash. Neurological:  Negative for weakness.    Reviewed allergy, medical, social, surgical, family and med list changes and updated   Files--     Social History     Socioeconomic History    Marital status:      Spouse name: None    Number of children: None    Years of education: None    Highest education level: None   Tobacco Use    Smoking status: Every Day     Packs/day: 0.50     Years: 27.00     Pack years: 13.50     Types: Cigarettes    Smokeless tobacco: Never   Substance and Sexual Activity    Alcohol use: No     Comment: quit 2012    Drug use: Yes     Types: Marijuana Berneta Kale)    Sexual activity: Yes     Social Determinants of Health     Financial Resource Strain: Low Risk     Difficulty of Paying Living Expenses: Not hard at all   Food Insecurity: No Food Insecurity    Worried About [address] [employer] in the Last Year: Never true    [address]  in the Last Year: Never true   Transportation Needs: No Transportation Needs    Lack of Transportation (Medical): No    Lack of Transportation (Non-Medical): No     Current Outpatient Medications   Medication Sig Dispense Refill    busPIRone (BUSPAR) 15 MG tablet Take 2 tabs in am and 1 tab in pm      sildenafil (VIAGRA) 100 MG tablet TAKE 1 TABLET BY MOUTH AS NEEDED FOR ERECTILE DYSFUNCTION. NO MORE THAN 1 TABLET IN 24 HOURS 30 tablet 0    ibuprofen (ADVIL;MOTRIN) 800 MG tablet TAKE 1 TABLET BY MOUTH EVERY 6 HOURS AS NEEDED FOR PAIN 120 tablet 0    QUEtiapine (SEROQUEL) 100 MG tablet Take 1 tablet by mouth nightly 30 tablet 0    albuterol sulfate HFA (PROVENTIL HFA) 108 (90 Base) MCG/ACT inhaler Inhale 2 puffs into the lungs every 6 hours as needed for Wheezing or Shortness of Breath 1 Inhaler 0    Artificial Tear Solution (GENTEAL TEARS) 0.1-0.2-0.3 % SOLN INSTILL 1 DROP INTO AFFECTED EYE(S) EVERY 4 HOURS (Patient not taking: Reported on 8/17/2022) 15 mL 5     No current facility-administered medications for this visit. 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 Heroin abuse       Objective:   /82   Pulse 67   Temp 97.8 °F (36.6 °C)   Resp 16   Ht 6' 3\" (1.905 m)   Wt 213 lb (96.6 kg)   SpO2 98%   BMI 26.62 kg/m²     Physical Exam  Patient with appropriate affect.-slight pressured speech  Alert     Thought content appropriate  Good eye contact  Heent; T.Ms normal.  No canal swelling or tragus tenderness. Gen:   NAD  Lungs: clear and equal breath sounds  Heart:   Rate reg. No murmur   Abdomen  B. S present. Soft. Non tender. No hepatosplenomegaly. No masses   Assessment:          Diagnosis Orders   1. Bipolar disorder, in partial remission, most recent episode hypomanic ([address]        2.  ED (erectile dysfunction) of organic origin        3. Arthritis of both knees        4. Bilateral tinnitus  [facility]endoza [facility], [facility]      5. Health care maintenance  Lipid Panel    Comprehensive Metabolic Panel    CBC with Auto Differential                Plan:      Orders Placed This Encounter   Medications    sildenafil (VIAGRA) 100 MG tablet     Sig: TAKE 1 TABLET BY MOUTH AS NEEDED FOR ERECTILE DYSFUNCTION.  NO MORE THAN 1 TABLET IN 24 HOURS     Dispense:  30 tablet     Refill:  0    QUEtiapine (SEROQUEL) 100 MG tablet     Sig: Take 1 tablet by mouth nightly     Dispense:  30 tablet     Refill:  0    ibuprofen (ADVIL;MOTRIN) 800 MG tablet     Sig: TAKE 1 TABLET BY MOUTH EVERY 8 HOURS AS NEEDED FOR PAIN     Dispense:  90 tablet     Refill:  0    albuterol sulfate HFA (PROVENTIL HFA) 108 (90 Base) MCG/ACT inhaler     Sig: Inhale 2 puffs into the lungs every 6 hours as needed for Wheezing or Shortness of Breath     Dispense:  1 each     Refill:  0    busPIRone (BUSPAR) 15 MG tablet     Sig: Take 2 tabs in am and 1 tab in pm     Dispense:  90 tablet     Refill:  0      Orders Placed This Encounter   Procedures    Lipid Panel     Standing Status:   Future     Standing Expiration Date:   8/17/2023    Comprehensive Metabolic Panel     Standing Status:   Future     Standing Expiration Date:   8/17/2023    CBC with Auto Differential     Standing Status:   Future     Standing Expiration Date:   2/17/2023    JORGE Mendoza MD, Otolaryngology, [place]     Referral Priority:   Routine     Referral Type:   Eval and Treat     Referral Reason:   Specialty Services Required     Referred to Provider:   Vasyl Wood MD     Requested Specialty:   Otolaryngology     Number of Visits Requested:   1    External Referral to Psychiatry     Referral Priority:   Routine     Referral Type:   Eval and Treat     Referral Reason:   Specialty Services Required     Requested Specialty:   [address] Number of Visits Requested:   1    Fasting blood work -f/u after done

## 2022-08-18 PROBLEM — R06.02 SOB (SHORTNESS OF BREATH): Status: ACTIVE | Noted: 2022-08-18

## 2022-08-18 PROBLEM — R06.2 WHEEZING: Status: ACTIVE | Noted: 2022-08-18

## 2022-08-29 DIAGNOSIS — Z00.00 HEALTH CARE MAINTENANCE: ICD-10-CM

## 2022-08-29 LAB
ALBUMIN SERPL-MCNC: 4.8 G/DL (ref 3.5–4.6)
ALP BLD-CCNC: 72 U/L (ref 35–104)
ALT SERPL-CCNC: 21 U/L (ref 0–41)
ANION GAP SERPL CALCULATED.3IONS-SCNC: 11 MEQ/L (ref 9–15)
ANISOCYTOSIS: ABNORMAL
AST SERPL-CCNC: 16 U/L (ref 0–40)
BASOPHILS ABSOLUTE: 0 K/UL (ref 0–0.2)
BASOPHILS RELATIVE PERCENT: 0.7 %
BILIRUB SERPL-MCNC: <0.2 MG/DL (ref 0.2–0.7)
BUN BLDV-MCNC: 16 MG/DL (ref 6–20)
CALCIUM SERPL-MCNC: 9.4 MG/DL (ref 8.5–9.9)
CHLORIDE BLD-SCNC: 109 MEQ/L (ref 95–107)
CHOLESTEROL, TOTAL: 171 MG/DL (ref 0–199)
CO2: 27 MEQ/L (ref 20–31)
CREAT SERPL-MCNC: 0.92 MG/DL (ref 0.7–1.2)
EOSINOPHILS ABSOLUTE: 0 K/UL (ref 0–0.7)
EOSINOPHILS RELATIVE PERCENT: 1 %
GFR AFRICAN AMERICAN: >60
GFR NON-AFRICAN AMERICAN: >60
GLOBULIN: 2.2 G/DL (ref 2.3–3.5)
GLUCOSE BLD-MCNC: 101 MG/DL (ref 70–99)
HCT VFR BLD CALC: 47 % (ref 42–52)
HDLC SERPL-MCNC: 52 MG/DL (ref 40–59)
HEMOGLOBIN: 15.6 G/DL (ref 14–18)
LDL CHOLESTEROL CALCULATED: 63 MG/DL (ref 0–129)
LYMPHOCYTES ABSOLUTE: 1.8 K/UL (ref 1–4.8)
LYMPHOCYTES RELATIVE PERCENT: 45 %
MCH RBC QN AUTO: 30.8 PG (ref 27–31.3)
MCHC RBC AUTO-ENTMCNC: 33.3 % (ref 33–37)
MCV RBC AUTO: 92.6 FL (ref 80–100)
MONOCYTES ABSOLUTE: 0.3 K/UL (ref 0.2–0.8)
MONOCYTES RELATIVE PERCENT: 7.8 %
NEUTROPHILS ABSOLUTE: 1.8 K/UL (ref 1.4–6.5)
NEUTROPHILS RELATIVE PERCENT: 46 %
PDW BLD-RTO: 12.8 % (ref 11.5–14.5)
PLATELET # BLD: 156 K/UL (ref 130–400)
PLATELET SLIDE REVIEW: NORMAL
POTASSIUM SERPL-SCNC: 4.9 MEQ/L (ref 3.4–4.9)
RBC # BLD: 5.07 M/UL (ref 4.7–6.1)
SODIUM BLD-SCNC: 147 MEQ/L (ref 135–144)
TOTAL PROTEIN: 7 G/DL (ref 6.3–8)
TRIGL SERPL-MCNC: 278 MG/DL (ref 0–150)
WBC # BLD: 3.9 K/UL (ref 4.8–10.8)

## 2022-09-06 ENCOUNTER — OFFICE VISIT (OUTPATIENT)
Dept: FAMILY MEDICINE CLINIC | Age: 47
End: 2022-09-06
Payer: COMMERCIAL

## 2022-09-06 VITALS
HEIGHT: 75 IN | OXYGEN SATURATION: 98 % | HEART RATE: 67 BPM | DIASTOLIC BLOOD PRESSURE: 84 MMHG | WEIGHT: 207 LBS | BODY MASS INDEX: 25.74 KG/M2 | TEMPERATURE: 97.7 F | RESPIRATION RATE: 16 BRPM | SYSTOLIC BLOOD PRESSURE: 120 MMHG

## 2022-09-06 DIAGNOSIS — E78.1 HYPERTRIGLYCERIDEMIA: Primary | ICD-10-CM

## 2022-09-06 DIAGNOSIS — D72.819 LEUKOPENIA, UNSPECIFIED TYPE: ICD-10-CM

## 2022-09-06 DIAGNOSIS — F31.31 BIPOLAR AFFECTIVE DISORDER, CURRENTLY DEPRESSED, MILD (HCC): ICD-10-CM

## 2022-09-06 DIAGNOSIS — Z72.0 TOBACCO ABUSE: ICD-10-CM

## 2022-09-06 DIAGNOSIS — Z12.11 COLON CANCER SCREENING: ICD-10-CM

## 2022-09-06 PROCEDURE — 99214 OFFICE O/P EST MOD 30 MIN: CPT | Performed by: FAMILY MEDICINE

## 2022-09-06 PROCEDURE — G8417 CALC BMI ABV UP PARAM F/U: HCPCS | Performed by: FAMILY MEDICINE

## 2022-09-06 PROCEDURE — G8427 DOCREV CUR MEDS BY ELIG CLIN: HCPCS | Performed by: FAMILY MEDICINE

## 2022-09-06 PROCEDURE — 4004F PT TOBACCO SCREEN RCVD TLK: CPT | Performed by: FAMILY MEDICINE

## 2022-09-06 RX ORDER — VARENICLINE TARTRATE 25 MG
KIT ORAL
Qty: 60 TABLET | Refills: 0 | Status: SHIPPED | OUTPATIENT
Start: 2022-09-06 | End: 2022-09-30

## 2022-09-06 SDOH — ECONOMIC STABILITY: FOOD INSECURITY: WITHIN THE PAST 12 MONTHS, YOU WORRIED THAT YOUR FOOD WOULD RUN OUT BEFORE YOU GOT MONEY TO BUY MORE.: NEVER TRUE

## 2022-09-06 SDOH — ECONOMIC STABILITY: FOOD INSECURITY: WITHIN THE PAST 12 MONTHS, THE FOOD YOU BOUGHT JUST DIDN'T LAST AND YOU DIDN'T HAVE MONEY TO GET MORE.: NEVER TRUE

## 2022-09-06 ASSESSMENT — SOCIAL DETERMINANTS OF HEALTH (SDOH): HOW HARD IS IT FOR YOU TO PAY FOR THE VERY BASICS LIKE FOOD, HOUSING, MEDICAL CARE, AND HEATING?: NOT HARD AT ALL

## 2022-09-06 ASSESSMENT — ENCOUNTER SYMPTOMS
COUGH: 0
VOMITING: 0
DIARRHEA: 0

## 2022-09-06 NOTE — PROGRESS NOTES
Subjective:      Patient ID: Romie Joy is a 50 y.o. male    Hyperlipidemia  The problem is resistant. Other  Pertinent negatives include no chills, coughing, fever, vomiting or weakness. Here in follow up for blood work. Still looking for psychiatrist for bipolar disorder. Still smoking 1.5 ppd and would like to restart chantix which he tolerated it well. Review of Systems   Constitutional:  Negative for chills and fever. Respiratory:  Negative for cough. Gastrointestinal:  Negative for diarrhea and vomiting. Neurological:  Negative for weakness. Reviewed allergy, medical, social, surgical, family and med list changes and updated   Files--reviewed blood work with elevated triglycerides and leucopenia      Social History     Socioeconomic History    Marital status:      Spouse name: None    Number of children: None    Years of education: None    Highest education level: None   Tobacco Use    Smoking status: Every Day     Packs/day: 0.50     Years: 27.00     Pack years: 13.50     Types: Cigarettes    Smokeless tobacco: Never   Substance and Sexual Activity    Alcohol use: No     Comment: quit 2012    Drug use: Yes     Types: Marijuana Javad Arroyo)    Sexual activity: Yes     Social Determinants of Health     Financial Resource Strain: Low Risk     Difficulty of Paying Living Expenses: Not hard at all   Food Insecurity: No Food Insecurity    Worried About Running Out of Food in the Last Year: Never true    [address] in the Last Year: Never true     Current Outpatient Medications   Medication Sig Dispense Refill    sildenafil (VIAGRA) 100 MG tablet TAKE 1 TABLET BY MOUTH AS NEEDED FOR ERECTILE DYSFUNCTION.  NO MORE THAN 1 TABLET IN 24 HOURS 30 tablet 0    QUEtiapine (SEROQUEL) 100 MG tablet Take 1 tablet by mouth nightly 30 tablet 0    ibuprofen (ADVIL;MOTRIN) 800 MG tablet TAKE 1 TABLET BY MOUTH EVERY 8 HOURS AS NEEDED FOR PAIN 90 tablet 0    albuterol sulfate HFA (PROVENTIL HFA) 108 (90 Base) MCG/ACT inhaler Inhale 2 puffs into the lungs every 6 hours as needed for Wheezing or Shortness of Breath 1 each 0    busPIRone (BUSPAR) 15 MG tablet Take 2 tabs in am and 1 tab in pm 90 tablet 0     No current facility-administered medications for this visit. Family History   Problem Relation Age of Onset    No Known Problems Mother     Heart Disease Father         pacemaker    No Known Problems Sister     No Known Problems Brother     No Known Problems Son     No Known Problems Daughter      Past Medical History:   Diagnosis Date    Anxiety     Chronic back pain     Drug abuse (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 Heroin abuse       Objective:   /84   Pulse 67   Temp 97.7 °F (36.5 °C)   Resp 16   Ht 6' 3\" (1.905 m)   Wt 207 lb (93.9 kg)   SpO2 98%   BMI 25.87 kg/m²     Physical Exam  Neck:no carotid bruits. No masses. No adenopathy. No thyroid asymmetry. Lungs:clear and equal breath sounds. No wheezes or rales. Heart:rate reg. No murmur. No gallops   Pulses:Radials 2+ equal               Poster tib 1+ equal  Extremities:no edema in either leg  Gen: In no acute distress  Abdomen; B.S present. Soft  Non tender. No hepatosplenomegaly. No masses    Patient with appropriate affect. Alert   Thought content appropriate  Good eye contact    Assessment:       Diagnosis Orders   1. Hypertriglyceridemia        2. Leukopenia, unspecified type        3. Tobacco abuse        4.  Bipolar affective disorder, currently depressed, mild ([facility] [address]               Plan:    Low cholesterol diet information   Bipolar disorder still not entirely stable -referral done -continue current meds for now   Needs follow up with psychiatrist -stressed importance   Orders Placed This Encounter   Procedures    Leonard Morataya MD, Gastroenterology, [facility]     Referral Priority:   Routine     Referral Type:   Eval and Treat     Referral Reason:   Specialty Services Required     Referred to Provider:   Tonya Bearden MD     Requested Specialty:   Gastroenterology     Number of Visits Requested:   1      Orders Placed This Encounter   Medications    varenicline (CHANTIX STARTING MONTH LEWIS) 0.5 MG X 11 & 1 MG X 42 tablet     Sig: As directed     Dispense:  60 tablet     Refill:  0    Continue current meds for now   F/u in 3 months if not seen by psychiatrist by then

## 2022-09-30 ENCOUNTER — OFFICE VISIT (OUTPATIENT)
Dept: FAMILY MEDICINE CLINIC | Age: 47
End: 2022-09-30
Payer: COMMERCIAL

## 2022-09-30 VITALS
OXYGEN SATURATION: 99 % | DIASTOLIC BLOOD PRESSURE: 70 MMHG | BODY MASS INDEX: 26.61 KG/M2 | HEART RATE: 77 BPM | RESPIRATION RATE: 16 BRPM | SYSTOLIC BLOOD PRESSURE: 102 MMHG | WEIGHT: 214 LBS | HEIGHT: 75 IN

## 2022-09-30 DIAGNOSIS — R05.8 PRODUCTIVE COUGH: ICD-10-CM

## 2022-09-30 DIAGNOSIS — J22 LOWER RESPIRATORY INFECTION: Primary | ICD-10-CM

## 2022-09-30 DIAGNOSIS — M17.0 ARTHRITIS OF BOTH KNEES: ICD-10-CM

## 2022-09-30 DIAGNOSIS — Z00.00 HEALTH CARE MAINTENANCE: ICD-10-CM

## 2022-09-30 PROCEDURE — 99213 OFFICE O/P EST LOW 20 MIN: CPT | Performed by: PHYSICIAN ASSISTANT

## 2022-09-30 PROCEDURE — 4004F PT TOBACCO SCREEN RCVD TLK: CPT | Performed by: PHYSICIAN ASSISTANT

## 2022-09-30 PROCEDURE — G8417 CALC BMI ABV UP PARAM F/U: HCPCS | Performed by: PHYSICIAN ASSISTANT

## 2022-09-30 PROCEDURE — G8427 DOCREV CUR MEDS BY ELIG CLIN: HCPCS | Performed by: PHYSICIAN ASSISTANT

## 2022-09-30 RX ORDER — ALBUTEROL SULFATE 90 UG/1
2 AEROSOL, METERED RESPIRATORY (INHALATION) EVERY 6 HOURS PRN
Qty: 1 EACH | Refills: 2 | Status: SHIPPED | OUTPATIENT
Start: 2022-09-30

## 2022-09-30 RX ORDER — AZITHROMYCIN 250 MG/1
TABLET, FILM COATED ORAL
Qty: 1 PACKET | Refills: 0 | Status: SHIPPED | OUTPATIENT
Start: 2022-09-30

## 2022-09-30 RX ORDER — GUAIFENESIN 600 MG/1
1200 TABLET, EXTENDED RELEASE ORAL 2 TIMES DAILY
Qty: 40 TABLET | Refills: 0 | Status: SHIPPED | OUTPATIENT
Start: 2022-09-30 | End: 2022-10-10

## 2022-09-30 RX ORDER — IBUPROFEN 800 MG/1
TABLET ORAL
Qty: 90 TABLET | Refills: 1 | Status: SHIPPED | OUTPATIENT
Start: 2022-09-30

## 2022-09-30 ASSESSMENT — ENCOUNTER SYMPTOMS
CHEST TIGHTNESS: 0
STRIDOR: 0
SHORTNESS OF BREATH: 0
COUGH: 1
SINUS PRESSURE: 1

## 2022-09-30 NOTE — PROGRESS NOTES
[facility] [address] Encounter  CHIEF COMPLAINT       Chief Complaint   Patient presents with    Cough     Cough x3 days, chest pressure        HISTORY OF PRESENT ILLNESS   Armando Lacey is a 50 y.o. male who presents with:  HPI    In the walk-in today with cough/sinus pressure x 1 week. Symptoms have been worsening over the past 3 days. Sputum is green. No fever. No known covid exposure. Would like refill of ibuprofen and inhaler. REVIEW OF SYSTEMS     Review of Systems   Constitutional:  Negative for appetite change, chills, diaphoresis, fatigue and fever. HENT:  Positive for congestion, postnasal drip and sinus pressure. Respiratory:  Positive for cough. Negative for chest tightness, shortness of breath and stridor. Neurological:  Negative for dizziness, light-headedness and headaches. 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 Heroin abuse       SURGICAL HISTORY     Patient  has a past surgical history that includes Vasectomy and Septoplasty (N/A, 8/1/2019). CURRENT MEDICATIONS       Previous Medications    BUSPIRONE (BUSPAR) 15 MG TABLET    Take 2 tabs in am and 1 tab in pm    QUETIAPINE (SEROQUEL) 100 MG TABLET    Take 1 tablet by mouth nightly    SILDENAFIL (VIAGRA) 100 MG TABLET    TAKE 1 TABLET BY MOUTH AS NEEDED FOR ERECTILE DYSFUNCTION. NO MORE THAN 1 TABLET IN 24 HOURS     ALLERGIES     Patient is has No Known Allergies. FAMILY HISTORY     Patient'sfamily history includes Heart Disease in his father; No Known Problems in his brother, daughter, mother, sister, and son. SOCIAL HISTORY     Patient  reports that he has been smoking cigarettes. He has a 13.50 pack-year smoking history. He has never used smokeless tobacco. He reports current drug use. Drug: Marijuana Louise Lux). He reports that he does not drink alcohol.   PHYSICAL EXAM     VITALS  BP: 102/70,  , Heart Rate: 77, Resp: 16, SpO2: 99 %  Wt Readings from Last 3 Encounters:   09/30/22 214 lb (97.1 kg)   09/06/22 207 lb (93.9 kg)   08/17/22 213 lb (96.6 kg)     Physical Exam  Vitals reviewed. Constitutional:       Appearance: Normal appearance. HENT:      Head: Normocephalic and atraumatic. Right Ear: External ear normal.      Left Ear: External ear normal.      Nose: Nose normal.   Cardiovascular:      Rate and Rhythm: Normal rate and regular rhythm. Pulmonary:      Effort: Pulmonary effort is normal. No respiratory distress. Breath sounds: Rhonchi and rales (faint, RLL) present. No wheezing. Musculoskeletal:         General: Normal range of motion. Skin:     General: Skin is warm. Neurological:      Mental Status: He is alert. FINAL IMPRESSION (ASSESSMENT & PLAN)      1. Lower respiratory infection    2. Arthritis of both knees    3. Health care maintenance    4. Productive cough      Oral abx therapy. PRN albuterol  OK for NSAID refill. No other questions at this time. Patient to follow up with PCP if symptoms worsen or do not improve. DISPOSITION/PLAN   DISCHARGE MEDICATIONS:  New Prescriptions    AZITHROMYCIN (ZITHROMAX Z-LEWIS) 250 MG TABLET    Take 2 pills together on the first day. Take 1 pill a day for the remaining 4 days. Cannot display discharge medications since this is not an admission.        Gretchen Navarro PA-C

## 2022-12-30 DIAGNOSIS — N52.9 ED (ERECTILE DYSFUNCTION) OF ORGANIC ORIGIN: ICD-10-CM

## 2022-12-31 DIAGNOSIS — F31.71 BIPOLAR DISORDER, IN PARTIAL REMISSION, MOST RECENT EPISODE HYPOMANIC (HCC): ICD-10-CM

## 2022-12-31 NOTE — TELEPHONE ENCOUNTER
Pharmacy requesting medication refill. Please approve or deny this request.    Rx requested:  Requested Prescriptions     Pending Prescriptions Disp Refills    busPIRone (BUSPAR) 15 MG tablet [Pharmacy Med Name: busPIRone HCl 15 MG Oral Tablet] 90 tablet 0     Sig: TAKE 2 TABLETS BY MOUTH IN THE MORNING AND 1 IN THE EVENING         Last Office Visit:   9/6/2022      Next Visit Date:  No future appointments.

## 2023-01-02 RX ORDER — SILDENAFIL 100 MG/1
TABLET, FILM COATED ORAL
Qty: 30 TABLET | Refills: 0 | Status: SHIPPED | OUTPATIENT
Start: 2023-01-02

## 2023-01-02 RX ORDER — BUSPIRONE HYDROCHLORIDE 15 MG/1
TABLET ORAL
Qty: 90 TABLET | Refills: 0 | Status: SHIPPED | OUTPATIENT
Start: 2023-01-02

## 2023-02-22 ENCOUNTER — OFFICE VISIT (OUTPATIENT)
Dept: FAMILY MEDICINE CLINIC | Age: 48
End: 2023-02-22

## 2023-02-22 VITALS
TEMPERATURE: 101.3 F | HEIGHT: 75 IN | HEART RATE: 79 BPM | OXYGEN SATURATION: 96 % | WEIGHT: 214 LBS | BODY MASS INDEX: 26.61 KG/M2

## 2023-02-22 DIAGNOSIS — J01.90 ACUTE BACTERIAL SINUSITIS: Primary | ICD-10-CM

## 2023-02-22 DIAGNOSIS — B96.89 ACUTE BACTERIAL SINUSITIS: Primary | ICD-10-CM

## 2023-02-22 LAB
INFLUENZA A ANTIBODY: NORMAL
INFLUENZA B ANTIBODY: NORMAL
Lab: NORMAL
PERFORMING INSTRUMENT: NORMAL
QC PASS/FAIL: NORMAL
SARS-COV-2, POC: NORMAL

## 2023-02-22 RX ORDER — TRIAMCINOLONE ACETONIDE 40 MG/ML
40 INJECTION, SUSPENSION INTRA-ARTICULAR; INTRAMUSCULAR ONCE
Status: COMPLETED | OUTPATIENT
Start: 2023-02-22 | End: 2023-02-22

## 2023-02-22 RX ORDER — KETOROLAC TROMETHAMINE 30 MG/ML
60 INJECTION, SOLUTION INTRAMUSCULAR; INTRAVENOUS ONCE
Status: COMPLETED | OUTPATIENT
Start: 2023-02-22 | End: 2023-02-22

## 2023-02-22 RX ORDER — KETOROLAC TROMETHAMINE 10 MG/1
10 TABLET, FILM COATED ORAL EVERY 6 HOURS PRN
Qty: 20 TABLET | Refills: 0 | Status: SHIPPED | OUTPATIENT
Start: 2023-02-22 | End: 2024-02-22

## 2023-02-22 RX ORDER — PREDNISONE 50 MG/1
50 TABLET ORAL DAILY
Qty: 5 TABLET | Refills: 0 | Status: SHIPPED | OUTPATIENT
Start: 2023-02-22 | End: 2023-02-27

## 2023-02-22 RX ADMIN — KETOROLAC TROMETHAMINE 60 MG: 30 INJECTION, SOLUTION INTRAMUSCULAR; INTRAVENOUS at 14:24

## 2023-02-22 RX ADMIN — TRIAMCINOLONE ACETONIDE 40 MG: 40 INJECTION, SUSPENSION INTRA-ARTICULAR; INTRAMUSCULAR at 14:26

## 2023-02-22 SDOH — ECONOMIC STABILITY: FOOD INSECURITY: WITHIN THE PAST 12 MONTHS, YOU WORRIED THAT YOUR FOOD WOULD RUN OUT BEFORE YOU GOT MONEY TO BUY MORE.: NEVER TRUE

## 2023-02-22 SDOH — ECONOMIC STABILITY: HOUSING INSECURITY
IN THE LAST 12 MONTHS, WAS THERE A TIME WHEN YOU DID NOT HAVE A STEADY PLACE TO SLEEP OR SLEPT IN A SHELTER (INCLUDING NOW)?: NO

## 2023-02-22 SDOH — ECONOMIC STABILITY: INCOME INSECURITY: HOW HARD IS IT FOR YOU TO PAY FOR THE VERY BASICS LIKE FOOD, HOUSING, MEDICAL CARE, AND HEATING?: NOT HARD AT ALL

## 2023-02-22 SDOH — ECONOMIC STABILITY: FOOD INSECURITY: WITHIN THE PAST 12 MONTHS, THE FOOD YOU BOUGHT JUST DIDN'T LAST AND YOU DIDN'T HAVE MONEY TO GET MORE.: NEVER TRUE

## 2023-02-22 ASSESSMENT — PATIENT HEALTH QUESTIONNAIRE - PHQ9
2. FEELING DOWN, DEPRESSED OR HOPELESS: 0
1. LITTLE INTEREST OR PLEASURE IN DOING THINGS: 0
7. TROUBLE CONCENTRATING ON THINGS, SUCH AS READING THE NEWSPAPER OR WATCHING TELEVISION: 0
SUM OF ALL RESPONSES TO PHQ QUESTIONS 1-9: 0
3. TROUBLE FALLING OR STAYING ASLEEP: 0
6. FEELING BAD ABOUT YOURSELF - OR THAT YOU ARE A FAILURE OR HAVE LET YOURSELF OR YOUR FAMILY DOWN: 0
4. FEELING TIRED OR HAVING LITTLE ENERGY: 0
9. THOUGHTS THAT YOU WOULD BE BETTER OFF DEAD, OR OF HURTING YOURSELF: 0
8. MOVING OR SPEAKING SO SLOWLY THAT OTHER PEOPLE COULD HAVE NOTICED. OR THE OPPOSITE, BEING SO FIGETY OR RESTLESS THAT YOU HAVE BEEN MOVING AROUND A LOT MORE THAN USUAL: 0
5. POOR APPETITE OR OVEREATING: 0
SUM OF ALL RESPONSES TO PHQ QUESTIONS 1-9: 0
SUM OF ALL RESPONSES TO PHQ QUESTIONS 1-9: 0
SUM OF ALL RESPONSES TO PHQ9 QUESTIONS 1 & 2: 0
SUM OF ALL RESPONSES TO PHQ QUESTIONS 1-9: 0
10. IF YOU CHECKED OFF ANY PROBLEMS, HOW DIFFICULT HAVE THESE PROBLEMS MADE IT FOR YOU TO DO YOUR WORK, TAKE CARE OF THINGS AT HOME, OR GET ALONG WITH OTHER PEOPLE: 0

## 2023-02-22 ASSESSMENT — ENCOUNTER SYMPTOMS
COUGH: 1
CONSTIPATION: 0
CHEST TIGHTNESS: 0
SHORTNESS OF BREATH: 0
NAUSEA: 0
SINUS PAIN: 1
EYE ITCHING: 0
EYE PAIN: 0
DIARRHEA: 0
EYE REDNESS: 0
PHOTOPHOBIA: 0
SINUS PRESSURE: 1
TROUBLE SWALLOWING: 0
EYE DISCHARGE: 0
ABDOMINAL PAIN: 0
SORE THROAT: 0
VOMITING: 0
WHEEZING: 0
RHINORRHEA: 1

## 2023-02-22 ASSESSMENT — VISUAL ACUITY: OU: 1

## 2023-02-22 NOTE — PROGRESS NOTES
Subjective:      Patient ID: Hector Mckeon is a 46 y.o. male who present today with:      Chief Complaint   Patient presents with    URI     Headaches, confusion, body aches, fever, chills start 2/21     Banging on the back of the head  At the base of the skull/neck  Sudden onset last night  No visual changes  Migraine? Had one before a long time ago  Fever  Went to dentist last week  Patient thought he had tooth pain  Dentist said it was a sinus infection  Does not like to take medication  Wallie Begin started it today  Old toradol from 4 years ago to help with the pain  Not helping  No sick contacts  Slight cough  No GI symptoms  Some confusion    Past Medical History:   Diagnosis Date    Anxiety     Chronic back pain     Drug abuse ([address]     pt states he has been clean since March 27,2017.   Heroin abuse       Patient Active Problem List    Diagnosis Date Noted    Lower respiratory infection 09/30/2022    Productive cough 09/30/2022    Wheezing 08/18/2022    SOB (shortness of breath) 08/18/2022    Bipolar affective disorder, currently depressed, mild ([address] 09/01/2021    Cervicalgia 06/08/2021    Cervical radiculopathy 06/08/2021    Chronic tension-type headache, not intractable 12/15/2020    Toothache 06/17/2020    Smoker 07/25/2019    Hypertrophy of nasal turbinates 03/22/2019    Refractory obstruction of nasal airway 03/22/2019    Chronic rhinitis 02/21/2019    Nasal septal deviation 10/19/2017    Heroin abuse ([address] 09/15/2016    ED (erectile dysfunction) of organic origin 10/29/2014    Anxiety     Chronic back pain     Lumbar and sacral osteoarthritis 08/19/2013     Past Surgical History:   Procedure Laterality Date    SEPTOPLASTY N/A 8/1/2019    SEPTOPLASTY, MICRODEBRIDER ASSISTED TURBINOPLASTY AND OUT FRACTURING BILATERAL NASAL ENDOSCOPY performed by Wei Chávez MD at [facility] History     Socioeconomic History    Marital status:      Spouse name: None    Number of children: None Years of education: None    Highest education level: None   Tobacco Use    Smoking status: Every Day     Packs/day: 0.50     Years: 27.00     Pack years: 13.50     Types: Cigarettes    Smokeless tobacco: Never   Substance and Sexual Activity    Alcohol use: No     Comment: quit 2012    Drug use: Yes     Types: Marijuana [place])    Sexual activity: Yes     Social Determinants of Health     Financial Resource Strain: Low Risk     Difficulty of Paying Living Expenses: Not hard at all   Food Insecurity: No Food Insecurity    Worried About Running Out of Food in the Last Year: Never true    Ran Out of Food in the Last Year: Never true   Transportation Needs: Unknown    Lack of Transportation (Non-Medical): No   Housing Stability: Unknown    Unstable Housing in the Last Year: No     Current Outpatient Medications on File Prior to Visit   Medication Sig Dispense Refill    sildenafil (VIAGRA) 100 MG tablet TAKE 1 TABLET BY MOUTH AS NEEDED FOR ERECTILE DYSFUNCTION. NO MORE THAN 1 TABLET IN 24 HOURS 30 tablet 0    busPIRone (BUSPAR) 15 MG tablet TAKE 2 TABLETS BY MOUTH IN THE MORNING AND 1 IN THE EVENING 90 tablet 0    albuterol sulfate HFA (PROVENTIL HFA) 108 (90 Base) MCG/ACT inhaler Inhale 2 puffs into the lungs every 6 hours as needed for Wheezing or Shortness of Breath 1 each 2    ibuprofen (ADVIL;MOTRIN) 800 MG tablet TAKE 1 TABLET BY MOUTH EVERY 8 HOURS AS NEEDED FOR PAIN 90 tablet 1    azithromycin (ZITHROMAX Z-LEWIS) 250 MG tablet Take 2 pills together on the first day. Take 1 pill a day for the remaining 4 days. 1 packet 0    QUEtiapine (SEROQUEL) 100 MG tablet Take 1 tablet by mouth nightly 30 tablet 0     No current facility-administered medications on file prior to visit. No Known Allergies     Review of Systems   Constitutional:  Positive for activity change, fatigue and fever. Negative for appetite change, chills and diaphoresis. HENT:  Positive for congestion, rhinorrhea, sinus pressure and sinus pain. Negative for ear pain, mouth sores, sore throat and trouble swallowing. Eyes:  Negative for photophobia, pain, discharge, redness and itching. Respiratory:  Positive for cough. Negative for chest tightness, shortness of breath and wheezing. Gastrointestinal:  Negative for abdominal pain, constipation, diarrhea, nausea and vomiting. Genitourinary:  Negative for dysuria, flank pain, frequency, hematuria and urgency. Musculoskeletal:  Negative for arthralgias and myalgias. Skin:  Negative for rash. Neurological:  Positive for headaches. Negative for seizures, syncope, facial asymmetry, speech difficulty and weakness. Objective:      Vitals:    02/22/23 1354   Pulse: 79   Temp: (!) 101.3 °F (38.5 °C)   SpO2: 96%   Weight: 214 lb (97.1 kg)   Height: 6' 3\" (1.905 m)     Physical Exam  Constitutional:       General: He is not in acute distress. Appearance: Normal appearance. He is ill-appearing. HENT:      Head: Normocephalic and atraumatic. Right Ear: Hearing, ear canal and external ear normal. A middle ear effusion is present. Left Ear: Hearing, ear canal and external ear normal. A middle ear effusion is present. Nose: Congestion and rhinorrhea present. Rhinorrhea is purulent. Right Sinus: Maxillary sinus tenderness and frontal sinus tenderness present. Left Sinus: Maxillary sinus tenderness and frontal sinus tenderness present. Mouth/Throat:      Lips: Pink. Mouth: Mucous membranes are moist.      Pharynx: Oropharynx is clear. Uvula midline. Tonsils: No tonsillar exudate. Eyes:      General: Lids are normal. Vision grossly intact. Extraocular Movements: Extraocular movements intact. Conjunctiva/sclera: Conjunctivae normal.      Pupils: Pupils are equal, round, and reactive to light. Cardiovascular:      Rate and Rhythm: Normal rate and regular rhythm. Heart sounds: Normal heart sounds.    Pulmonary:      Effort: Pulmonary effort is normal.      Breath sounds: Normal breath sounds and air entry. Musculoskeletal:      Cervical back: Full passive range of motion without pain. Lymphadenopathy:      Head:      Right side of head: Submandibular and tonsillar adenopathy present. Left side of head: Submandibular and tonsillar adenopathy present. Cervical: Cervical adenopathy present. Skin:     General: Skin is warm and dry. Findings: No rash. Neurological:      Mental Status: He is alert. Assessment & Plan:   Kellee Wheeler was seen today for uri. Diagnoses and all orders for this visit:    Acute bacterial sinusitis  -     POCT COVID-19, Antigen  -     POCT Influenza A/B  -     triamcinolone acetonide (KENALOG-40) injection 40 mg  -     ketorolac (TORADOL) injection 60 mg  -     ketorolac (TORADOL) 10 MG tablet; Take 1 tablet by mouth every 6 hours as needed for Pain  -     predniSONE (DELTASONE) 50 MG tablet; Take 1 tablet by mouth daily for 5 days    Side effects, adverse effects of the medication prescribed today, as well as treatment plan/ rationale and result expectations have been discussed with the patient who expresses understanding and desires to proceed. Close follow up to evaluate treatment results and for coordination of care. I have reviewed the patient's medical history in detail and updated the computerized patient record. As always, patient is advised that if symptoms worsen in any way they will proceed to the nearest emergency room.      Follow up in 48-72 hours if symptoms persist or worsen and as needed    Ayana Ricrado, PATRICIA - CNP

## 2023-02-22 NOTE — PROGRESS NOTES
After obtaining consent, and per orders of Keegan GOMEZ , injection of Ketorolac given in Left upper quad. gluteus by Douglas Cortez MA. Patient instructed to remain in clinic for 20 minutes afterwards, and to report any adverse reaction to me immediately. After obtaining consent, and per orders of Dr. Keegan GOMEZ, injection of kenalog  given in Right upper quad. gluteus by Douglas Cortez MA. Patient instructed to remain in clinic for 20 minutes afterwards, and to report any adverse reaction to me immediately.  Ztrack tolerated well

## 2023-02-28 ENCOUNTER — OFFICE VISIT (OUTPATIENT)
Dept: FAMILY MEDICINE CLINIC | Age: 48
End: 2023-02-28
Payer: COMMERCIAL

## 2023-02-28 VITALS
BODY MASS INDEX: 26.61 KG/M2 | HEIGHT: 75 IN | HEART RATE: 68 BPM | TEMPERATURE: 97.5 F | WEIGHT: 214 LBS | OXYGEN SATURATION: 95 %

## 2023-02-28 DIAGNOSIS — J40 BRONCHITIS: Primary | ICD-10-CM

## 2023-02-28 DIAGNOSIS — M17.0 ARTHRITIS OF BOTH KNEES: ICD-10-CM

## 2023-02-28 DIAGNOSIS — F31.31 BIPOLAR AFFECTIVE DISORDER, CURRENTLY DEPRESSED, MILD (HCC): ICD-10-CM

## 2023-02-28 PROCEDURE — G8484 FLU IMMUNIZE NO ADMIN: HCPCS | Performed by: NURSE PRACTITIONER

## 2023-02-28 PROCEDURE — G8427 DOCREV CUR MEDS BY ELIG CLIN: HCPCS | Performed by: NURSE PRACTITIONER

## 2023-02-28 PROCEDURE — 99213 OFFICE O/P EST LOW 20 MIN: CPT | Performed by: NURSE PRACTITIONER

## 2023-02-28 PROCEDURE — 4004F PT TOBACCO SCREEN RCVD TLK: CPT | Performed by: NURSE PRACTITIONER

## 2023-02-28 PROCEDURE — G8417 CALC BMI ABV UP PARAM F/U: HCPCS | Performed by: NURSE PRACTITIONER

## 2023-02-28 PROCEDURE — 3017F COLORECTAL CA SCREEN DOC REV: CPT | Performed by: NURSE PRACTITIONER

## 2023-02-28 RX ORDER — PREDNISONE 10 MG/1
TABLET ORAL
Qty: 30 TABLET | Refills: 0 | Status: SHIPPED | OUTPATIENT
Start: 2023-02-28

## 2023-02-28 RX ORDER — IBUPROFEN 800 MG/1
TABLET ORAL
Qty: 90 TABLET | Refills: 1 | Status: SHIPPED | OUTPATIENT
Start: 2023-02-28

## 2023-02-28 RX ORDER — GUAIFENESIN 600 MG/1
1200 TABLET, EXTENDED RELEASE ORAL 2 TIMES DAILY
Qty: 40 TABLET | Refills: 0 | Status: SHIPPED | OUTPATIENT
Start: 2023-02-28 | End: 2023-03-10

## 2023-02-28 RX ORDER — DOXYCYCLINE HYCLATE 100 MG
100 TABLET ORAL 2 TIMES DAILY
Qty: 20 TABLET | Refills: 0 | Status: SHIPPED | OUTPATIENT
Start: 2023-02-28 | End: 2023-03-10

## 2023-02-28 NOTE — PROGRESS NOTES
Subjective:      Patient ID: Orlando Rosales is a 46 y.o. male who presents today for:  Chief Complaint   Patient presents with    URI     Ringing ear, pressure chest start couple days        HPI        He feels like this is lingering from before he was seen 2/22  Given prednisone for 5 days and toradol   Coughing   Feels like theres a heaviness in the chest   Ringing in the right ear   Still lingering on  Hes done with his medicine   He was on a zpack from his dentist  Was told he had a sinus infection by the dentist   He did go ahead a take the [address]   He took the prednisone   Deep cough   He does smoke   Hes not coughing up phlegm   He would also like a refill on his Ibuprofen           Past Medical History:   Diagnosis Date    Anxiety     Chronic back pain     Drug abuse ([address]     pt states he has been clean since March 27,2017.   Heroin abuse       Past Surgical History:   Procedure Laterality Date    SEPTOPLASTY N/A 8/1/2019    SEPTOPLASTY, MICRODEBRIDER ASSISTED TURBINOPLASTY AND OUT FRACTURING BILATERAL NASAL ENDOSCOPY performed by Vangie Floyd MD at [facility] History    Marital status:      Spouse name: Not on file    Number of children: Not on file    Years of education: Not on file    Highest education level: Not on file   Occupational History    Not on file   Tobacco Use    Smoking status: Every Day     Packs/day: 0.50     Years: 27.00     Pack years: 13.50     Types: Cigarettes    Smokeless tobacco: Never   Substance and Sexual Activity    Alcohol use: No     Comment: quit 2012    Drug use: Yes     Types: Marijuana [place])    Sexual activity: Yes   Other Topics Concern    Not on file   Social History Narrative    Not on file     Social Determinants of Health     Financial Resource Strain: Low Risk     Difficulty of Paying Living Expenses: Not hard at all   Food Insecurity: No Food Insecurity    Worried About [address] in the Last Year: Never true    Ran Out of Food in the Last Year: Never true   Transportation Needs: Unknown    Lack of Transportation (Medical): Not on file    Lack of Transportation (Non-Medical): No   Physical Activity: Not on file   Stress: Not on file   Social Connections: Not on file   Intimate Partner Violence: Not on file   Housing Stability: Unknown    Unable to Pay for Housing in the Last Year: Not on file    Number of Places Lived in the Last Year: Not on file    Unstable Housing in the Last Year: No     Family History   Problem Relation Age of Onset    No Known Problems Mother     Heart Disease Father         pacemaker    No Known Problems Sister     No Known Problems Brother     No Known Problems Son     No Known Problems Daughter      No Known Allergies  Current Outpatient Medications   Medication Sig Dispense Refill    ibuprofen (ADVIL;MOTRIN) 800 MG tablet TAKE 1 TABLET BY MOUTH EVERY 8 HOURS AS NEEDED FOR PAIN 90 tablet 1    guaiFENesin (MUCINEX) 600 MG extended release tablet Take 2 tablets by mouth 2 times daily for 10 days 40 tablet 0    doxycycline hyclate (VIBRA-TABS) 100 MG tablet Take 1 tablet by mouth 2 times daily for 10 days 20 tablet 0    predniSONE (DELTASONE) 10 MG tablet Take 4 tabs daily for 4 days then 3 tabs daily for 3 days then 2 tabs daily for 2 day then 1 tab daily once 30 tablet 0    sildenafil (VIAGRA) 100 MG tablet TAKE 1 TABLET BY MOUTH AS NEEDED FOR ERECTILE DYSFUNCTION. NO MORE THAN 1 TABLET IN 24 HOURS 30 tablet 0    busPIRone (BUSPAR) 15 MG tablet TAKE 2 TABLETS BY MOUTH IN THE MORNING AND 1 IN THE EVENING 90 tablet 0    albuterol sulfate HFA (PROVENTIL HFA) 108 (90 Base) MCG/ACT inhaler Inhale 2 puffs into the lungs every 6 hours as needed for Wheezing or Shortness of Breath 1 each 2    QUEtiapine (SEROQUEL) 100 MG tablet Take 1 tablet by mouth nightly 30 tablet 0     No current facility-administered medications for this visit.           Review of Systems   Constitutional:  Negative for chills, fatigue and fever. HENT:  Positive for tinnitus. Negative for congestion, rhinorrhea, sore throat and trouble swallowing. Respiratory:  Positive for cough. Negative for shortness of breath and wheezing. Gastrointestinal:  Negative for diarrhea, nausea and vomiting. Musculoskeletal:  Negative for myalgias. Skin:  Negative for rash. Neurological:  Negative for dizziness, light-headedness and headaches. Psychiatric/Behavioral:  Negative for agitation, confusion and hallucinations. Objective:   Pulse 68   Temp 97.5 °F (36.4 °C)   Ht 6' 3\" (1.905 m)   Wt 214 lb (97.1 kg)   SpO2 95%   BMI 26.75 kg/m²     Physical Exam  Vitals reviewed. Constitutional:       Appearance: Normal appearance. HENT:      Head: Normocephalic and atraumatic. Right Ear: Hearing, tympanic membrane, ear canal and external ear normal. No middle ear effusion. No foreign body. Tympanic membrane is not injected, erythematous or bulging. Left Ear: Hearing, tympanic membrane, ear canal and external ear normal.  No middle ear effusion. No foreign body. Tympanic membrane is not injected, erythematous or bulging. Nose: Nose normal. No congestion or rhinorrhea. Right Nostril: No foreign body. Left Nostril: No foreign body. Right Turbinates: Not enlarged. Left Turbinates: Not enlarged. Right Sinus: No maxillary sinus tenderness or frontal sinus tenderness. Left Sinus: No maxillary sinus tenderness or frontal sinus tenderness. Mouth/Throat:      Lips: Pink. Mouth: Mucous membranes are moist.      Pharynx: Oropharynx is clear. Uvula midline. No pharyngeal swelling, oropharyngeal exudate, posterior oropharyngeal erythema or uvula swelling. Tonsils: No tonsillar exudate or tonsillar abscesses. Eyes:      General: Lids are normal.         Right eye: No foreign body. Left eye: No foreign body. Extraocular Movements: Extraocular movements intact. Conjunctiva/sclera: Conjunctivae normal.   Cardiovascular:      Rate and Rhythm: Normal rate and regular rhythm. Heart sounds: Normal heart sounds. Pulmonary:      Effort: Pulmonary effort is normal. No tachypnea, accessory muscle usage or respiratory distress. Breath sounds: Normal breath sounds. No wheezing or rhonchi. Comments: Harsh cough   Abdominal:      Tenderness: There is no abdominal tenderness. There is no guarding. Musculoskeletal:         General: Normal range of motion. Cervical back: Normal range of motion. Lymphadenopathy:      Cervical: No cervical adenopathy. Upper Body:      Right upper body: No supraclavicular adenopathy. Left upper body: No supraclavicular adenopathy. Skin:     General: Skin is warm and dry. Capillary Refill: Capillary refill takes less than 2 seconds. Neurological:      General: No focal deficit present. Mental Status: He is alert and oriented to person, place, and time. Gait: Gait is intact. Psychiatric:         Mood and Affect: Mood normal.         Speech: Speech normal.         Behavior: Behavior normal. Behavior is cooperative. Thought Content: Thought content normal.         Judgment: Judgment normal.       Assessment:       Diagnosis Orders   1. Bronchitis  guaiFENesin (MUCINEX) 600 MG extended release tablet    doxycycline hyclate (VIBRA-TABS) 100 MG tablet    predniSONE (DELTASONE) 10 MG tablet      2. Arthritis of both knees  ibuprofen (ADVIL;MOTRIN) 800 MG tablet      3. Bipolar affective disorder, currently depressed, mild ([address]  Stable, managed by PCP        No results found for this visit on 02/28/23. Plan:     Assessment & Plan   Sharron Najera was seen today for uri. Diagnoses and all orders for this visit:    Bronchitis  -     guaiFENesin (MUCINEX) 600 MG extended release tablet; Take 2 tablets by mouth 2 times daily for 10 days  -     doxycycline hyclate (VIBRA-TABS) 100 MG tablet;  Take 1 tablet by mouth 2 times daily for 10 days  -     predniSONE (DELTASONE) 10 MG tablet; Take 4 tabs daily for 4 days then 3 tabs daily for 3 days then 2 tabs daily for 2 day then 1 tab daily once    Arthritis of both knees  -     ibuprofen (ADVIL;MOTRIN) 800 MG tablet; TAKE 1 TABLET BY MOUTH EVERY 8 HOURS AS NEEDED FOR PAIN    Bipolar affective disorder, currently depressed, mild (HCC)    No orders of the defined types were placed in this encounter. Orders Placed This Encounter   Medications    ibuprofen (ADVIL;MOTRIN) 800 MG tablet     Sig: TAKE 1 TABLET BY MOUTH EVERY 8 HOURS AS NEEDED FOR PAIN     Dispense:  90 tablet     Refill:  1    guaiFENesin (MUCINEX) 600 MG extended release tablet     Sig: Take 2 tablets by mouth 2 times daily for 10 days     Dispense:  40 tablet     Refill:  0    doxycycline hyclate (VIBRA-TABS) 100 MG tablet     Sig: Take 1 tablet by mouth 2 times daily for 10 days     Dispense:  20 tablet     Refill:  0    predniSONE (DELTASONE) 10 MG tablet     Sig: Take 4 tabs daily for 4 days then 3 tabs daily for 3 days then 2 tabs daily for 2 day then 1 tab daily once     Dispense:  30 tablet     Refill:  0       Medications Discontinued During This Encounter   Medication Reason    ketorolac (TORADOL) 10 MG tablet LIST CLEANUP    azithromycin (ZITHROMAX Z-LEWIS) 250 MG tablet LIST CLEANUP    ibuprofen (ADVIL;MOTRIN) 800 MG tablet REORDER    guaiFENesin (MUCINEX) 600 MG extended release tablet REORDER     Return if symptoms worsen or fail to improve. Reviewed with the patient/family: current clinical status & medications. Side effects of the medication prescribed today, as well as treatment plan/rationale and result expectations have been discussed with the patient/family who expresses understanding. Patient will be discharged home in stable condition. Follow up with PCP to evaluate treatment results or return if symptoms worsen or fail to improve.  Discussed signs and symptoms which require immediate follow-up in ED/call to 911. Understanding verbalized. I have reviewed the patient's medical history in detail and updated the computerized patient record.     PATRICIA Cantor - CNP

## 2023-03-01 ASSESSMENT — ENCOUNTER SYMPTOMS
RHINORRHEA: 0
SORE THROAT: 0
VOMITING: 0
WHEEZING: 0
SHORTNESS OF BREATH: 0
DIARRHEA: 0
NAUSEA: 0
COUGH: 1
TROUBLE SWALLOWING: 0

## 2023-03-08 DIAGNOSIS — N52.9 ED (ERECTILE DYSFUNCTION) OF ORGANIC ORIGIN: ICD-10-CM

## 2023-03-08 RX ORDER — SILDENAFIL 100 MG/1
TABLET, FILM COATED ORAL
Qty: 30 TABLET | Refills: 0 | Status: SHIPPED | OUTPATIENT
Start: 2023-03-08

## 2023-03-08 NOTE — TELEPHONE ENCOUNTER
Future Appointments    This patient does not currently have any appointments scheduled. Past Visits    Date Provider Specialty Visit Type Primary Dx   02/28/2023 Tana Gutierrez.  PATRICIA Stewart - CNP Family Medicine Office Visit Bronchitis   02/22/2023 PATRICIA Parra - CNP Family Medicine Office Visit Acute bacterial sinusitis   09/30/2022 Alejandra Davis PA-C Family Medicine Office Visit Lower respiratory infection   09/06/2022 Rell Downey MD Family Medicine Office Visit Hypertriglyceridemia

## 2023-04-15 ENCOUNTER — HOSPITAL ENCOUNTER (OUTPATIENT)
Dept: DATA CONVERSION | Facility: HOSPITAL | Age: 48
End: 2023-05-04
Attending: PHYSICAL MEDICINE & REHABILITATION
Payer: COMMERCIAL

## 2023-04-16 LAB
ANION GAP IN SER/PLAS: 12 MMOL/L (ref 10–20)
ANION GAP IN SER/PLAS: 12 MMOL/L (ref 10–20)
CALCIUM (MG/DL) IN SER/PLAS: 9 MG/DL (ref 8.6–10.3)
CALCIUM (MG/DL) IN SER/PLAS: 9 MG/DL (ref 8.6–10.3)
CARBON DIOXIDE, TOTAL (MMOL/L) IN SER/PLAS: 25 MMOL/L (ref 21–32)
CARBON DIOXIDE, TOTAL (MMOL/L) IN SER/PLAS: 25 MMOL/L (ref 21–32)
CHLORIDE (MMOL/L) IN SER/PLAS: 102 MMOL/L (ref 98–107)
CHLORIDE (MMOL/L) IN SER/PLAS: 102 MMOL/L (ref 98–107)
CREATININE (MG/DL) IN SER/PLAS: 0.82 MG/DL (ref 0.5–1.3)
CREATININE (MG/DL) IN SER/PLAS: 0.82 MG/DL (ref 0.5–1.3)
ERYTHROCYTE DISTRIBUTION WIDTH (RATIO) BY AUTOMATED COUNT: 15.1 % (ref 11.5–14.5)
ERYTHROCYTE DISTRIBUTION WIDTH (RATIO) BY AUTOMATED COUNT: 15.1 % (ref 11.5–14.5)
ERYTHROCYTE MEAN CORPUSCULAR HEMOGLOBIN CONCENTRATION (G/DL) BY AUTOMATED: 31.8 G/DL (ref 32–36)
ERYTHROCYTE MEAN CORPUSCULAR HEMOGLOBIN CONCENTRATION (G/DL) BY AUTOMATED: 31.8 G/DL (ref 32–36)
ERYTHROCYTE MEAN CORPUSCULAR VOLUME (FL) BY AUTOMATED COUNT: 94 FL (ref 80–100)
ERYTHROCYTE MEAN CORPUSCULAR VOLUME (FL) BY AUTOMATED COUNT: 94 FL (ref 80–100)
ERYTHROCYTES (10*6/UL) IN BLOOD BY AUTOMATED COUNT: 2.95 X10E12/L (ref 4.5–5.9)
ERYTHROCYTES (10*6/UL) IN BLOOD BY AUTOMATED COUNT: 2.95 X10E12/L (ref 4.5–5.9)
GFR MALE: >90 ML/MIN/1.73M2
GFR MALE: >90 ML/MIN/1.73M2
GLUCOSE (MG/DL) IN SER/PLAS: 119 MG/DL (ref 74–99)
GLUCOSE (MG/DL) IN SER/PLAS: 119 MG/DL (ref 74–99)
HEMATOCRIT (%) IN BLOOD BY AUTOMATED COUNT: 27.7 % (ref 41–52)
HEMATOCRIT (%) IN BLOOD BY AUTOMATED COUNT: 27.7 % (ref 41–52)
HEMOGLOBIN (G/DL) IN BLOOD: 8.8 G/DL (ref 13.5–17.5)
HEMOGLOBIN (G/DL) IN BLOOD: 8.8 G/DL (ref 13.5–17.5)
LEUKOCYTES (10*3/UL) IN BLOOD BY AUTOMATED COUNT: 6.6 X10E9/L (ref 4.4–11.3)
LEUKOCYTES (10*3/UL) IN BLOOD BY AUTOMATED COUNT: 6.6 X10E9/L (ref 4.4–11.3)
PLATELETS (10*3/UL) IN BLOOD AUTOMATED COUNT: 448 X10E9/L (ref 150–450)
PLATELETS (10*3/UL) IN BLOOD AUTOMATED COUNT: 448 X10E9/L (ref 150–450)
POTASSIUM (MMOL/L) IN SER/PLAS: 4 MMOL/L (ref 3.5–5.3)
POTASSIUM (MMOL/L) IN SER/PLAS: 4 MMOL/L (ref 3.5–5.3)
SODIUM (MMOL/L) IN SER/PLAS: 135 MMOL/L (ref 136–145)
SODIUM (MMOL/L) IN SER/PLAS: 135 MMOL/L (ref 136–145)
UREA NITROGEN (MG/DL) IN SER/PLAS: 17 MG/DL (ref 6–23)
UREA NITROGEN (MG/DL) IN SER/PLAS: 17 MG/DL (ref 6–23)

## 2023-04-21 LAB
ANION GAP IN SER/PLAS: 11 MMOL/L (ref 10–20)
ANION GAP IN SER/PLAS: NORMAL
CALCIUM (MG/DL) IN SER/PLAS: 9 MG/DL (ref 8.6–10.3)
CALCIUM (MG/DL) IN SER/PLAS: NORMAL
CARBON DIOXIDE, TOTAL (MMOL/L) IN SER/PLAS: 26 MMOL/L (ref 21–32)
CARBON DIOXIDE, TOTAL (MMOL/L) IN SER/PLAS: NORMAL
CHLORIDE (MMOL/L) IN SER/PLAS: 107 MMOL/L (ref 98–107)
CHLORIDE (MMOL/L) IN SER/PLAS: NORMAL
CREATININE (MG/DL) IN SER/PLAS: 0.8 MG/DL (ref 0.5–1.3)
CREATININE (MG/DL) IN SER/PLAS: NORMAL
ERYTHROCYTE DISTRIBUTION WIDTH (RATIO) BY AUTOMATED COUNT: 15.2 % (ref 11.5–14.5)
ERYTHROCYTE DISTRIBUTION WIDTH (RATIO) BY AUTOMATED COUNT: NORMAL
ERYTHROCYTE MEAN CORPUSCULAR HEMOGLOBIN CONCENTRATION (G/DL) BY AUTOMATED: 31.3 G/DL (ref 32–36)
ERYTHROCYTE MEAN CORPUSCULAR HEMOGLOBIN CONCENTRATION (G/DL) BY AUTOMATED: NORMAL
ERYTHROCYTE MEAN CORPUSCULAR VOLUME (FL) BY AUTOMATED COUNT: 97 FL (ref 80–100)
ERYTHROCYTE MEAN CORPUSCULAR VOLUME (FL) BY AUTOMATED COUNT: NORMAL
ERYTHROCYTES (10*6/UL) IN BLOOD BY AUTOMATED COUNT: 3 X10E12/L (ref 4.5–5.9)
ERYTHROCYTES (10*6/UL) IN BLOOD BY AUTOMATED COUNT: NORMAL
GFR FEMALE: NORMAL
GFR MALE: >90 ML/MIN/1.73M2
GFR MALE: NORMAL
GLUCOSE (MG/DL) IN SER/PLAS: 142 MG/DL (ref 74–99)
GLUCOSE (MG/DL) IN SER/PLAS: NORMAL
HEMATOCRIT (%) IN BLOOD BY AUTOMATED COUNT: 29.1 % (ref 41–52)
HEMATOCRIT (%) IN BLOOD BY AUTOMATED COUNT: NORMAL
HEMOGLOBIN (G/DL) IN BLOOD: 9.1 G/DL (ref 13.5–17.5)
HEMOGLOBIN (G/DL) IN BLOOD: NORMAL
LEUKOCYTES (10*3/UL) IN BLOOD BY AUTOMATED COUNT: 7 X10E9/L (ref 4.4–11.3)
LEUKOCYTES (10*3/UL) IN BLOOD BY AUTOMATED COUNT: NORMAL
NRBC (PER 100 WBCS) BY AUTOMATED COUNT: NORMAL
PLATELETS (10*3/UL) IN BLOOD AUTOMATED COUNT: 549 X10E9/L (ref 150–450)
PLATELETS (10*3/UL) IN BLOOD AUTOMATED COUNT: NORMAL
POTASSIUM (MMOL/L) IN SER/PLAS: 4.1 MMOL/L (ref 3.5–5.3)
POTASSIUM (MMOL/L) IN SER/PLAS: NORMAL
SODIUM (MMOL/L) IN SER/PLAS: 140 MMOL/L (ref 136–145)
SODIUM (MMOL/L) IN SER/PLAS: NORMAL
UREA NITROGEN (MG/DL) IN SER/PLAS: 13 MG/DL (ref 6–23)
UREA NITROGEN (MG/DL) IN SER/PLAS: NORMAL

## 2023-04-25 LAB
APPEARANCE, URINE: CLEAR
BILIRUBIN, URINE: NEGATIVE
BLOOD, URINE: NEGATIVE
COLOR, URINE: YELLOW
GLUCOSE, URINE: NEGATIVE MG/DL
KETONES, URINE: NEGATIVE MG/DL
LEUKOCYTE ESTERASE, URINE: NEGATIVE
NITRITE, URINE: NEGATIVE
PH, URINE: 6 (ref 5–8)
PROTEIN, URINE: NEGATIVE MG/DL
SPECIFIC GRAVITY, URINE: 1.02 (ref 1–1.03)
UROBILINOGEN, URINE: <2 MG/DL (ref 0–1.9)

## 2023-04-27 LAB
ANION GAP IN SER/PLAS: 11 MMOL/L (ref 10–20)
CALCIUM (MG/DL) IN SER/PLAS: 9.1 MG/DL (ref 8.6–10.3)
CARBON DIOXIDE, TOTAL (MMOL/L) IN SER/PLAS: 28 MMOL/L (ref 21–32)
CHLORIDE (MMOL/L) IN SER/PLAS: 104 MMOL/L (ref 98–107)
CREATININE (MG/DL) IN SER/PLAS: 0.85 MG/DL (ref 0.5–1.3)
ERYTHROCYTE DISTRIBUTION WIDTH (RATIO) BY AUTOMATED COUNT: 14.8 % (ref 11.5–14.5)
ERYTHROCYTE MEAN CORPUSCULAR HEMOGLOBIN CONCENTRATION (G/DL) BY AUTOMATED: 31.5 G/DL (ref 32–36)
ERYTHROCYTE MEAN CORPUSCULAR VOLUME (FL) BY AUTOMATED COUNT: 96 FL (ref 80–100)
ERYTHROCYTES (10*6/UL) IN BLOOD BY AUTOMATED COUNT: 3.39 X10E12/L (ref 4.5–5.9)
GFR MALE: >90 ML/MIN/1.73M2
GLUCOSE (MG/DL) IN SER/PLAS: 94 MG/DL (ref 74–99)
HEMATOCRIT (%) IN BLOOD BY AUTOMATED COUNT: 32.4 % (ref 41–52)
HEMOGLOBIN (G/DL) IN BLOOD: 10.2 G/DL (ref 13.5–17.5)
LEUKOCYTES (10*3/UL) IN BLOOD BY AUTOMATED COUNT: 4.2 X10E9/L (ref 4.4–11.3)
PLATELETS (10*3/UL) IN BLOOD AUTOMATED COUNT: 397 X10E9/L (ref 150–450)
POTASSIUM (MMOL/L) IN SER/PLAS: 4.1 MMOL/L (ref 3.5–5.3)
SODIUM (MMOL/L) IN SER/PLAS: 139 MMOL/L (ref 136–145)
UREA NITROGEN (MG/DL) IN SER/PLAS: 20 MG/DL (ref 6–23)

## 2023-05-01 LAB
AMPHETAMINE SCREEN BLOOD: NEGATIVE NG/ML
BARBITURATE SCREEN BLOOD: POSITIVE NG/ML
BENZODIAZEPINES SCREEN BLOOD: NEGATIVE NG/ML
BUPRENORPHINE SCREEN BLOOD: NEGATIVE NG/ML
CANNABINOIDS SCREEN BLOOD: NEGATIVE NG/ML
COCAINE SCREEN BLOOD: NEGATIVE NG/ML
DRUG SCREEN COMMENT BLOOD: NORMAL
METHADONE SCREEN BLOOD: NEGATIVE NG/ML
METHAMPHETAMINE, BLOOD, SCREEN: NEGATIVE NG/ML
OPIATE SCREEN BLOOD: NEGATIVE NG/ML
OXYCODONE SCREEN BLOOD: POSITIVE NG/ML
PHENCYCLIDINE SCREEN BLOOD: NEGATIVE NG/ML

## 2023-05-04 LAB
6-ACETYLMORPHINE BLOOD QUANTITATIVE: <2 NG/ML
CODEINE BLOOD QUANTITATIVE: <2 NG/ML
HYDROCODONE BLOOD QUANTITATIVE: <2 NG/ML
HYDROMORPHONE BLOOD QUANTITATIVE: <2 NG/ML
MORPHINE BLOOD QUANTITATIVE: <2 NG/ML
OXYCODONE BLOOD QUANTITATIVE: 11 NG/ML
OXYMORPHONE BLOOD QUANTITATIVE: <2 NG/ML

## 2023-05-05 LAB
ANION GAP IN SER/PLAS: NORMAL
CALCIUM (MG/DL) IN SER/PLAS: NORMAL
CARBON DIOXIDE, TOTAL (MMOL/L) IN SER/PLAS: NORMAL
CHLORIDE (MMOL/L) IN SER/PLAS: NORMAL
CREATININE (MG/DL) IN SER/PLAS: NORMAL
ERYTHROCYTE DISTRIBUTION WIDTH (RATIO) BY AUTOMATED COUNT: NORMAL
ERYTHROCYTE MEAN CORPUSCULAR HEMOGLOBIN CONCENTRATION (G/DL) BY AUTOMATED: NORMAL
ERYTHROCYTE MEAN CORPUSCULAR VOLUME (FL) BY AUTOMATED COUNT: NORMAL
ERYTHROCYTES (10*6/UL) IN BLOOD BY AUTOMATED COUNT: NORMAL
GFR FEMALE: NORMAL
GFR MALE: NORMAL
GLUCOSE (MG/DL) IN SER/PLAS: NORMAL
HEMATOCRIT (%) IN BLOOD BY AUTOMATED COUNT: NORMAL
HEMOGLOBIN (G/DL) IN BLOOD: NORMAL
LEUKOCYTES (10*3/UL) IN BLOOD BY AUTOMATED COUNT: NORMAL
NRBC (PER 100 WBCS) BY AUTOMATED COUNT: NORMAL
PLATELETS (10*3/UL) IN BLOOD AUTOMATED COUNT: NORMAL
POTASSIUM (MMOL/L) IN SER/PLAS: NORMAL
SODIUM (MMOL/L) IN SER/PLAS: NORMAL
UREA NITROGEN (MG/DL) IN SER/PLAS: NORMAL

## 2023-05-06 LAB
Lab: 132 NG/ML
Lab: <50 NG/ML
Lab: <50 NG/ML

## 2023-05-16 ENCOUNTER — OFFICE VISIT (OUTPATIENT)
Dept: FAMILY MEDICINE CLINIC | Age: 48
End: 2023-05-16
Payer: COMMERCIAL

## 2023-05-16 VITALS
WEIGHT: 220 LBS | OXYGEN SATURATION: 98 % | TEMPERATURE: 97.9 F | DIASTOLIC BLOOD PRESSURE: 86 MMHG | HEART RATE: 81 BPM | SYSTOLIC BLOOD PRESSURE: 137 MMHG | HEIGHT: 75 IN | BODY MASS INDEX: 27.35 KG/M2

## 2023-05-16 DIAGNOSIS — S32.9XXS: ICD-10-CM

## 2023-05-16 DIAGNOSIS — Z09 HOSPITAL DISCHARGE FOLLOW-UP: Primary | ICD-10-CM

## 2023-05-16 PROCEDURE — 99215 OFFICE O/P EST HI 40 MIN: CPT | Performed by: STUDENT IN AN ORGANIZED HEALTH CARE EDUCATION/TRAINING PROGRAM

## 2023-05-16 PROCEDURE — 3017F COLORECTAL CA SCREEN DOC REV: CPT | Performed by: STUDENT IN AN ORGANIZED HEALTH CARE EDUCATION/TRAINING PROGRAM

## 2023-05-16 PROCEDURE — 4004F PT TOBACCO SCREEN RCVD TLK: CPT | Performed by: STUDENT IN AN ORGANIZED HEALTH CARE EDUCATION/TRAINING PROGRAM

## 2023-05-16 PROCEDURE — 1111F DSCHRG MED/CURRENT MED MERGE: CPT | Performed by: STUDENT IN AN ORGANIZED HEALTH CARE EDUCATION/TRAINING PROGRAM

## 2023-05-16 PROCEDURE — G8417 CALC BMI ABV UP PARAM F/U: HCPCS | Performed by: STUDENT IN AN ORGANIZED HEALTH CARE EDUCATION/TRAINING PROGRAM

## 2023-05-16 PROCEDURE — G8427 DOCREV CUR MEDS BY ELIG CLIN: HCPCS | Performed by: STUDENT IN AN ORGANIZED HEALTH CARE EDUCATION/TRAINING PROGRAM

## 2023-05-16 RX ORDER — ASPIRIN 81 MG/1
81 TABLET, CHEWABLE ORAL 2 TIMES DAILY
COMMUNITY
End: 2023-05-16 | Stop reason: SDUPTHER

## 2023-05-16 RX ORDER — ASPIRIN 81 MG/1
81 TABLET, CHEWABLE ORAL 2 TIMES DAILY
Qty: 30 TABLET | Refills: 1 | Status: SHIPPED | OUTPATIENT
Start: 2023-05-16 | End: 2023-06-15

## 2023-05-16 RX ORDER — OXYCODONE HYDROCHLORIDE AND ACETAMINOPHEN 5; 325 MG/1; MG/1
1 TABLET ORAL EVERY 6 HOURS PRN
COMMUNITY
Start: 2023-05-12 | End: 2023-05-16 | Stop reason: ALTCHOICE

## 2023-05-16 RX ORDER — ASPIRIN 81 MG
1 TABLET, DELAYED RELEASE (ENTERIC COATED) ORAL 2 TIMES DAILY
COMMUNITY
End: 2023-05-16 | Stop reason: SDUPTHER

## 2023-05-16 RX ORDER — AMOXICILLIN 250 MG
2 CAPSULE ORAL NIGHTLY
COMMUNITY

## 2023-05-16 RX ORDER — POLYETHYLENE GLYCOL 3350 17 G/17G
17 POWDER, FOR SOLUTION ORAL DAILY
COMMUNITY

## 2023-05-16 RX ORDER — ASPIRIN 81 MG
1 TABLET, DELAYED RELEASE (ENTERIC COATED) ORAL 2 TIMES DAILY
Qty: 60 TABLET | Refills: 1 | Status: SHIPPED | OUTPATIENT
Start: 2023-05-16 | End: 2023-06-15

## 2023-05-16 RX ORDER — DOCUSATE SODIUM 100 MG/1
100 CAPSULE, LIQUID FILLED ORAL 2 TIMES DAILY
COMMUNITY

## 2023-05-16 RX ORDER — OXYCODONE HYDROCHLORIDE 10 MG/1
10 TABLET ORAL EVERY 4 HOURS PRN
COMMUNITY
End: 2023-05-16 | Stop reason: ALTCHOICE

## 2023-05-16 RX ORDER — OXYCODONE HYDROCHLORIDE AND ACETAMINOPHEN 5; 325 MG/1; MG/1
1 TABLET ORAL EVERY 6 HOURS PRN
Qty: 28 TABLET | Refills: 0 | Status: SHIPPED | OUTPATIENT
Start: 2023-05-16 | End: 2023-05-23

## 2023-05-16 RX ORDER — BISACODYL 5 MG/1
5 TABLET, DELAYED RELEASE ORAL NIGHTLY
COMMUNITY

## 2023-05-16 RX ORDER — ACETAMINOPHEN 325 MG/1
650 TABLET ORAL EVERY 6 HOURS PRN
COMMUNITY

## 2023-05-16 NOTE — PROGRESS NOTES
Subjective  Iram Bronson, 46 y.o. male presents today with:  Chief Complaint   Patient presents with    Follow-Up from [facility] to [facility] following a fall out of a tree 4/6/23. Had 4 surgeries to repair several areas. D/C to [facility] 4/14/23. D/C home 5/4/23. Has home health coming to the home. Is sched for f/u w/surgeon on the 25th & has an appt with a new PCP w/[facility], Dr. Phyllis Zamudio, on the 30th. States he needed to be seen today mostly for refills of medications. Patient with appointment for hospital discharge follow-up. He had an extended hospital stay at [facility] for trauma. He fell out of a 40 foot tree on 4/6. He was discharged to [facility] rehab on 4/14. Was discharged home on 5/4. He had fractured pelvis and lumbar spine. He also had internal artery repairs. He has been following with surgery. He was discharged from rehab. He continues to have pretty severe pain. He also needs refill of his aspirin and calcium. Pain is worse with movement, better with rest.  He is using oxycodone as needed for this. This does help to alleviate the pain, does not take away completely. He is requesting a refill of this today. He denies any issues with constipation. No abdominal pain. He has no other concerns at this time. Review of Systems   Constitutional:  Negative for chills, fatigue, fever and unexpected weight change. HENT:  Negative for congestion, rhinorrhea and sore throat. Eyes:  Negative for discharge. Respiratory:  Negative for cough, shortness of breath and wheezing. Cardiovascular:  Negative for chest pain, palpitations and leg swelling. Gastrointestinal:  Negative for abdominal pain, diarrhea, nausea and vomiting. Genitourinary:  Negative for difficulty urinating. Musculoskeletal:  Positive for arthralgias, back pain and gait problem. Negative for joint swelling. Skin:  Negative for rash.    Neurological:  Negative for weakness, light-headedness, numbness

## 2023-05-17 ASSESSMENT — ENCOUNTER SYMPTOMS
BACK PAIN: 1
RHINORRHEA: 0
EYE DISCHARGE: 0
DIARRHEA: 0
VOMITING: 0
ABDOMINAL PAIN: 0
COUGH: 0
NAUSEA: 0
SORE THROAT: 0
SHORTNESS OF BREATH: 0
WHEEZING: 0

## 2023-05-18 ENCOUNTER — HOSPITAL ENCOUNTER (EMERGENCY)
Age: 48
Discharge: HOME | End: 2023-05-18
Attending: FAMILY MEDICINE
Payer: COMMERCIAL

## 2023-05-18 ENCOUNTER — APPOINTMENT (OUTPATIENT)
Dept: CT IMAGING | Age: 48
End: 2023-05-18
Payer: COMMERCIAL

## 2023-05-18 VITALS
SYSTOLIC BLOOD PRESSURE: 177 MMHG | HEIGHT: 75 IN | OXYGEN SATURATION: 98 % | BODY MASS INDEX: 27.35 KG/M2 | HEART RATE: 81 BPM | TEMPERATURE: 98.5 F | RESPIRATION RATE: 16 BRPM | WEIGHT: 220 LBS | DIASTOLIC BLOOD PRESSURE: 93 MMHG

## 2023-05-18 DIAGNOSIS — S32.82XG MULTIPLE CLOSED FRACTURES OF PELVIS WITHOUT DISRUPTION OF PELVIC RING WITH DELAYED HEALING, SUBSEQUENT ENCOUNTER: Primary | ICD-10-CM

## 2023-05-18 PROCEDURE — 72192 CT PELVIS W/O DYE: CPT

## 2023-05-18 PROCEDURE — 99284 EMERGENCY DEPT VISIT MOD MDM: CPT

## 2023-05-18 PROCEDURE — 6370000000 HC RX 637 (ALT 250 FOR IP): Performed by: FAMILY MEDICINE

## 2023-05-18 RX ORDER — OXYCODONE HYDROCHLORIDE AND ACETAMINOPHEN 5; 325 MG/1; MG/1
1 TABLET ORAL ONCE
Status: COMPLETED | OUTPATIENT
Start: 2023-05-18 | End: 2023-05-18

## 2023-05-18 RX ADMIN — OXYCODONE AND ACETAMINOPHEN 1 TABLET: 5; 325 TABLET ORAL at 18:36

## 2023-05-18 RX ADMIN — OXYCODONE AND ACETAMINOPHEN 1 TABLET: 5; 325 TABLET ORAL at 16:48

## 2023-05-18 ASSESSMENT — PAIN DESCRIPTION - PAIN TYPE: TYPE: ACUTE PAIN

## 2023-05-18 ASSESSMENT — PAIN DESCRIPTION - DESCRIPTORS: DESCRIPTORS: SHARP

## 2023-05-18 ASSESSMENT — PAIN DESCRIPTION - FREQUENCY: FREQUENCY: CONTINUOUS

## 2023-05-18 ASSESSMENT — PAIN - FUNCTIONAL ASSESSMENT
PAIN_FUNCTIONAL_ASSESSMENT: 0-10
PAIN_FUNCTIONAL_ASSESSMENT: PREVENTS OR INTERFERES SOME ACTIVE ACTIVITIES AND ADLS

## 2023-05-18 ASSESSMENT — PAIN DESCRIPTION - ORIENTATION: ORIENTATION: LEFT

## 2023-05-18 ASSESSMENT — PAIN SCALES - GENERAL: PAINLEVEL_OUTOF10: 10

## 2023-05-18 ASSESSMENT — PAIN DESCRIPTION - LOCATION: LOCATION: BUTTOCKS

## 2023-05-18 ASSESSMENT — PAIN DESCRIPTION - ONSET: ONSET: ON-GOING

## 2023-05-18 NOTE — ED PROVIDER NOTES
radiographic images are visualized and preliminarily interpreted by the emergency physician with the below findings:        Interpretation per the Radiologist below, if available at the time ofthis note:    CT PELVIS WO CONTRAST Additional Contrast? None   Final Result   1. Multiple comminuted fractures involving right and left aspect of the   pelvis including right superior and inferior pubic rami, right acetabulum,   right iliac bone, and right aspect of the sacrum. 2. Internal fixating hardware is present and appears intact. 3. Many of these fractures show no significant evidence of healing. 4. Short-term follow-up recommended. 5. No subcutaneous gas or evidence of soft tissue abscess or hematoma. ED BEDSIDE ULTRASOUND:   Performed by ED Physician - none    LABS:  Labs Reviewed - No data to display    All other labs were within normal range or not returned as of this dictation. Procedures      Medical Decision Making  46years old presented to the ED with severe pelvic pain patient had a major trauma was treated in [facility] 2 weeks ago have multiple commuted fracture of the pelvis had surgery and was in rehab for 3 weeks was just released from the rehab and ran out of pain medication he was on Percocet every 6 hours ran out yesterday have an appoint with pain management tomorrow was coming to the ER and hope for controlling pain CT of the pelvis was performed showed commuted fracture hardware in place patient was given Percocet in the ER and 1 Percocet to go home for tonight to help control the pain until he is evaluated by pain management tomorrow    Amount and/or Complexity of Data Reviewed  Radiology: ordered. Risk  Prescription drug management. MDM  Reviewed: nursing note, vitals and previous chart (Chart reviewed from [facility] for recent trauma)  Reviewed previous: CT scan  Interpretation: CT scan     FINAL IMPRESSION      1.  Multiple closed fractures

## 2023-05-18 NOTE — ED TRIAGE NOTES
Pt c/o left buttocks pain, recent surgery for multiple Fx after a fall, Pt is A&OX4, calm, afebrile, breathes are equal and unlabored

## 2023-05-20 ASSESSMENT — ENCOUNTER SYMPTOMS
BACK PAIN: 1
RESPIRATORY NEGATIVE: 1

## 2023-05-22 LAB
AMPHETAMINE (PRESENCE) IN URINE BY SCREEN METHOD: ABNORMAL
BARBITURATES PRESENCE IN URINE BY SCREEN METHOD: ABNORMAL
BENZODIAZEPINE (PRESENCE) IN URINE BY SCREEN METHOD: ABNORMAL
CANNABINOIDS IN URINE BY SCREEN METHOD: ABNORMAL
COCAINE (PRESENCE) IN URINE BY SCREEN METHOD: ABNORMAL
DRUG SCREEN COMMENT URINE: ABNORMAL
FENTANYL URINE: ABNORMAL
METHADONE (PRESENCE) IN URINE BY SCREEN METHOD: ABNORMAL
OPIATES (PRESENCE) IN URINE BY SCREEN METHOD: ABNORMAL
OXYCODONE (PRESENCE) IN URINE BY SCREEN METHOD: ABNORMAL
PHENCYCLIDINE (PRESENCE) IN URINE BY SCREEN METHOD: ABNORMAL

## 2023-05-25 LAB
6-ACETYLMORPHINE: <25 NG/ML
CODEINE: <50 NG/ML
HYDROCODONE: <25 NG/ML
HYDROMORPHONE: <25 NG/ML
MORPHINE URINE: <50 NG/ML
NORHYDROCODONE: <25 NG/ML
NOROXYCODONE: >1000 NG/ML
OXYCODONE: >2500 NG/ML
OXYMORPHONE: >2500 NG/ML

## 2023-05-30 ENCOUNTER — OFFICE VISIT (OUTPATIENT)
Dept: PRIMARY CARE | Facility: CLINIC | Age: 48
End: 2023-05-30
Payer: COMMERCIAL

## 2023-05-30 VITALS
HEART RATE: 58 BPM | HEIGHT: 75 IN | DIASTOLIC BLOOD PRESSURE: 76 MMHG | RESPIRATION RATE: 20 BRPM | OXYGEN SATURATION: 96 % | SYSTOLIC BLOOD PRESSURE: 139 MMHG | BODY MASS INDEX: 28.39 KG/M2 | WEIGHT: 228.3 LBS

## 2023-05-30 DIAGNOSIS — G89.4 CHRONIC PAIN SYNDROME: ICD-10-CM

## 2023-05-30 DIAGNOSIS — R53.81 DEBILITY: Primary | ICD-10-CM

## 2023-05-30 DIAGNOSIS — S32.9XXS CLOSED DISPLACED FRACTURE OF PELVIS, UNSPECIFIED PART OF PELVIS, SEQUELA: ICD-10-CM

## 2023-05-30 DIAGNOSIS — F17.210 CIGARETTE NICOTINE DEPENDENCE, UNCOMPLICATED: ICD-10-CM

## 2023-05-30 DIAGNOSIS — R73.9 HYPERGLYCEMIA: ICD-10-CM

## 2023-05-30 DIAGNOSIS — F19.11 DRUG ABUSE IN REMISSION (MULTI): ICD-10-CM

## 2023-05-30 DIAGNOSIS — F51.04 CHRONIC INSOMNIA: ICD-10-CM

## 2023-05-30 DIAGNOSIS — D64.9 ANEMIA, NORMOCYTIC NORMOCHROMIC: ICD-10-CM

## 2023-05-30 DIAGNOSIS — N52.9 ERECTILE DYSFUNCTION, UNSPECIFIED ERECTILE DYSFUNCTION TYPE: ICD-10-CM

## 2023-05-30 DIAGNOSIS — R74.01 TRANSAMINASEMIA: ICD-10-CM

## 2023-05-30 DIAGNOSIS — Z13.220 SCREENING, LIPID: ICD-10-CM

## 2023-05-30 DIAGNOSIS — F98.8 ATTENTION DEFICIT DISORDER, UNSPECIFIED HYPERACTIVITY PRESENCE: ICD-10-CM

## 2023-05-30 DIAGNOSIS — R45.4 IRRITABILITY AND ANGER: ICD-10-CM

## 2023-05-30 DIAGNOSIS — E55.9 VITAMIN D DEFICIENCY: ICD-10-CM

## 2023-05-30 DIAGNOSIS — R03.0 BLOOD PRESSURE ELEVATED WITHOUT HISTORY OF HTN: ICD-10-CM

## 2023-05-30 DIAGNOSIS — K59.09 CHRONIC CONSTIPATION: ICD-10-CM

## 2023-05-30 DIAGNOSIS — F41.0 PANIC DISORDER WITHOUT AGORAPHOBIA: ICD-10-CM

## 2023-05-30 PROBLEM — S32.9XXA: Status: ACTIVE | Noted: 2023-05-30

## 2023-05-30 PROCEDURE — 99204 OFFICE O/P NEW MOD 45 MIN: CPT | Performed by: INTERNAL MEDICINE

## 2023-05-30 PROCEDURE — 3008F BODY MASS INDEX DOCD: CPT | Performed by: INTERNAL MEDICINE

## 2023-05-30 RX ORDER — ACETAMINOPHEN 500 MG
1000 TABLET ORAL 3 TIMES DAILY
COMMUNITY
Start: 2023-05-22 | End: 2024-04-12 | Stop reason: SDUPTHER

## 2023-05-30 RX ORDER — IBUPROFEN 800 MG/1
800 TABLET ORAL EVERY 8 HOURS PRN
COMMUNITY
Start: 2023-05-02 | End: 2024-04-03 | Stop reason: SDUPTHER

## 2023-05-30 RX ORDER — NAPROXEN SODIUM 220 MG/1
81 TABLET, FILM COATED ORAL
Qty: 90 TABLET | Refills: 0 | Status: SHIPPED | OUTPATIENT
Start: 2023-05-30 | End: 2023-11-22 | Stop reason: ALTCHOICE

## 2023-05-30 RX ORDER — GABAPENTIN 300 MG/1
600 CAPSULE ORAL 3 TIMES DAILY
COMMUNITY
Start: 2023-05-17 | End: 2024-02-07 | Stop reason: SDUPTHER

## 2023-05-30 RX ORDER — NAPROXEN SODIUM 220 MG/1
TABLET, FILM COATED ORAL
COMMUNITY
Start: 2023-05-04 | End: 2023-05-30 | Stop reason: SDUPTHER

## 2023-05-30 RX ORDER — DULOXETIN HYDROCHLORIDE 30 MG/1
30 CAPSULE, DELAYED RELEASE ORAL DAILY
COMMUNITY
Start: 2023-05-19 | End: 2023-11-22 | Stop reason: ALTCHOICE

## 2023-05-30 RX ORDER — OXYCODONE HYDROCHLORIDE 5 MG/1
5 TABLET ORAL EVERY 6 HOURS PRN
COMMUNITY
Start: 2023-05-19 | End: 2023-10-10 | Stop reason: SDUPTHER

## 2023-05-30 RX ORDER — CALCIUM CARBONATE/VITAMIN D3 250-3.125
2 TABLET ORAL 2 TIMES DAILY
COMMUNITY
Start: 2023-05-05 | End: 2023-11-22 | Stop reason: ALTCHOICE

## 2023-05-30 RX ORDER — QUETIAPINE FUMARATE 100 MG/1
TABLET, FILM COATED ORAL
COMMUNITY
Start: 2023-05-02

## 2023-05-30 RX ORDER — MELOXICAM 15 MG/1
15 TABLET ORAL DAILY
COMMUNITY
Start: 2023-05-19 | End: 2024-02-13 | Stop reason: SDUPTHER

## 2023-05-30 RX ORDER — DOCUSATE SODIUM 100 MG/1
100 CAPSULE, LIQUID FILLED ORAL 2 TIMES DAILY
COMMUNITY
Start: 2023-05-04

## 2023-05-30 RX ORDER — BUSPIRONE HYDROCHLORIDE 15 MG/1
15 TABLET ORAL 3 TIMES DAILY
COMMUNITY
Start: 2023-04-28

## 2023-05-30 RX ORDER — BISACODYL 5 MG
10 TABLET, DELAYED RELEASE (ENTERIC COATED) ORAL NIGHTLY
COMMUNITY
Start: 2023-05-04 | End: 2024-06-06 | Stop reason: ALTCHOICE

## 2023-05-30 RX ORDER — SILDENAFIL 100 MG/1
TABLET, FILM COATED ORAL
COMMUNITY

## 2023-05-30 NOTE — PROGRESS NOTES
Subjective   Patient ID: Bony Galvan is a 48 y.o. male who presents for New Patient Visit (New Patient.).    HPI   48-year-old male here for primary care.  Here especially because of significant interval history April 6, 2023, when the patient fell off a tree.  States he fell 35 feet, fracturing his pelvis.  Being followed closely by ORTHOPEDICS after reconstruction.  On BABY ASPIRIN chewable every day, compliant with medication, tolerating regimen.  No dyspepsia.  No gum or nose bleeding.  No easy bruisability.  Likewise, no palpitations, dizziness, lightheadedness, or any other symptoms that might be referable to hypovolemia.  Likewise, no apparent blood in urine or stool.    History of chronic pain.  History of ADHD, being followed by chronic pain specialist, as well as psychiatrist.  History of substance abuse.  History of alcohol use.  The patient states that he has been abstinent since April, and plans not to subject himself to any recreational drug use and alcohol use.  Does smoke 1 pack a day, has been doing so since age 20 perhaps.  Occasional coughing, no particular sputum production.  CONSTITUTIONALLY, no fever, no chills.  No night sweats.  No lingering anorexia or nausea.  No apparent lymphadenopathy.  No apparent weight loss.    Does state irritability, insomnia, compliant with medications, tolerating regimens.  Not worried about memory.  No confusion or delirium.  No headache, blurred vision, diplopia.  No dysphagia.  Does want to continue to get better and improve quality of life.  Does not wish harm to self or others.    Blood pressure elevation noted, with patient stating no knowledge of having any cardiovascular diagnoses.  Likewise, no jorge alberto substernal chest pain.  No orthopnea, no paroxysmal nocturnal dyspnea.  Noted to have ANEMIA as well.  Scheduled to have a COLONOSCOPY sometime soon.  Appetite preserved.  Constipation controlled.  No apparent blood or mucus in stool.  Likewise, no dysuria,  "flank, suprapubic pain.  Has used SILDENAFIL successfully, and continues to want to enjoy intimacy.  No skin changes, rashes, pruritus, jaundice.  ENDOCRINE with no polyuria, polydipsia, polyphagia.  No blurred vision.  No skin, hair, nail changes.  No dramatic weight loss or weight gain.            Review of Systems  Review of systems as in history of present illness, and otherwise, reviewed separately as well, and was unremarkable/negative/noncontributory.          Objective   /76 (BP Location: Left arm, Patient Position: Sitting, BP Cuff Size: Adult)   Pulse 58   Resp 20   Ht 1.905 m (6' 3\")   Wt 104 kg (228 lb 4.8 oz)   SpO2 96%   BMI 28.54 kg/m²     Physical Exam  Perhaps minimally irritable, frustrated, but continues to want to get better and improve quality of life.  Does not wish harm to self or others.  Currently, not in distress or diaphoresis.  Receptive.  Oriented x3.  Amiable.  Not unkempt.  Seemingly appropriate.  Moderately built.    HEAD pink palpebral conjunctivae, anicteric sclerae.  Mucous membranes moist.  No tonsillopharyngeal congestion, no thrush.  NECK supple, no apparent jugular venous distention.  No carotid bruit.  CARDIOVASCULAR not in distress or diaphoresis.  No bipedal edema.  Regular rate and rhythm.  No murmurs.  LUNGS not in distress or diaphoresis.  Not using accessory muscles.  Clear to auscultation bilaterally.  ABDOMEN soft, nontender.  BACK no costovertebral angle tenderness.  EXTREMITIES no clubbing, no cyanosis.  SKIN no jaundice.  NEURO no facial asymmetry.  No apparent cranial nerve deficits.  Patient comfortable in wheelchair.  No tremors.  PSYCH receptive, appropriate, and eager to maintain and improve quality of life.        LABORATORY results reviewed with the patient.  MEDICATION list likewise reviewed with the patient.          Assessment/Plan   Problem List Items Addressed This Visit       Panic disorder without agoraphobia    Erectile dysfunction    Drug " abuse in remission (CMS/[facility])     Other Visit Diagnoses       Debility    -  Primary    Closed displaced fracture of pelvis, unspecified part of pelvis, sequela        04-    Relevant Medications    aspirin 81 mg chewable tablet    Chronic pain syndrome        Blood pressure elevated without history of HTN        Relevant Medications    aspirin 81 mg chewable tablet    Hyperglycemia        Relevant Medications    aspirin 81 mg chewable tablet    Screening, lipid        Anemia, normocytic normochromic        Transaminasemia        Cigarette nicotine dependence, uncomplicated        Chronic constipation        Irritability and anger        Chronic insomnia        Attention deficit disorder, unspecified hyperactivity presence                       Thank you very much for coming.  I am happy to finally meet you.    I am glad to see that you are following closely with ORTHOPEDICS, as well as CHRONIC PAIN.  I am also glad to hear that you are following with your PSYCHIATRIST.  All of these individuals are very instrumental in getting you back on your feet.    In the meantime, your blood pressure is mildly elevated.  Let us see if this is of any significance.  FASTING blood and urine examinations anytime soon.  You can have these done at [facility].  Let me send word regarding results and possible changes.    In the meantime, drinking lots of fluids, and avoiding salt, these will help with blood pressure control.  Likewise, what ever physical activity you can do, including physical therapy, all of these also help control your blood pressure better.    SMOKING unfortunately also increases blood pressure.  Try and see if you can start cutting back on smoking.  The goal will be to see if you can manage with only 15 cigarettes a day, instead of a full pack.    Please try to see when you have the most cravings for smoking.  We will discuss what options you have to cut back on smoking when you return.  Remember that  the best thing that you can do for smoking is to quit eventually.    There is more to investigate.  Your blood count is a little low.  Your liver was showing signs of strain at one-point.  Let us see what your latest laboratory results reveal.    I am glad that you have been clean.  Continue to abstain from any recreational drugs.  Continue to abstain from alcohol as well.    Again, thank you very much for coming.  Please do not hesitate to call with questions or concerns.  Please come back in 2 months, so that we will have robust information to take care of each issue that you may have.  You are 48 years old, and you have inherent risks and inherent recommendations.    Please keep your appointment with COLONOSCOPY to be done at [facility].  I will wait on results and possible changes.    Please continue to take care of yourself and each other, and please continue to pray for our recovery from this pandemic.  I am looking forward to seeing you in 2 months.  Advanced happy Father's Day to us!  Regards to your wife, Caro.  She was very helpful in giving us needed information!            0  Return in 2 months.  40 minutes please.  COMPLETE physical examination.  Review preventive strategies, cardiovascular risk.  Coordinate with psychiatry, GI, chronic pain, orthopedics.  Reassess mood, energy, function, preventive strategies.            0

## 2023-05-30 NOTE — PROGRESS NOTES
"Subjective   Patient ID: Bony Galvan is a 48 y.o. male who presents for New Patient Visit (New Patient.).    HPI     Review of Systems    Objective   /76 (BP Location: Left arm, Patient Position: Sitting, BP Cuff Size: Adult)   Pulse 58   Resp 20   Ht 1.905 m (6' 3\")   Wt 104 kg (228 lb 4.8 oz)   SpO2 96%   BMI 28.54 kg/m²     Physical Exam    Assessment/Plan        Thank you very much for coming.  I am happy to finally meet you.    I am glad to see that you are following closely with ORTHOPEDICS, as well as CHRONIC PAIN.  I am also glad to hear that you are following with your PSYCHIATRIST.  All of these individuals are very instrumental in getting you back on your feet.    In the meantime, your blood pressure is mildly elevated.  Let us see if this is of any significance.  FASTING blood and urine examinations anytime soon.  You can have these done at [facility].  Let me send word regarding results and possible changes.    In the meantime, drinking lots of fluids, and avoiding salt, these will help with blood pressure control.  Likewise, what ever physical activity you can do, including physical therapy, all of these also help control your blood pressure better.    SMOKING unfortunately also increases blood pressure.  Try and see if you can start cutting back on smoking.  The goal will be to see if you can manage with only 15 cigarettes a day, instead of a full pack.    Please try to see when you have the most cravings for smoking.  We will discuss what options you have to cut back on smoking when you return.  Remember that the best thing that you can do for smoking is to quit eventually.    There is more to investigate.  Your blood count is a little low.  Your liver was showing signs of strain at one-point.  Let us see what your latest laboratory results reveal.    I am glad that you have been clean.  Continue to abstain from any recreational drugs.  Continue to abstain from alcohol as " well.    Again, thank you very much for coming.  Please do not hesitate to call with questions or concerns.  Please come back in 2 months, so that we will have robust information to take care of each issue that you may have.  You are 48 years old, and you have inherent risks and inherent recommendations.    Please keep your appointment with COLONOSCOPY to be done at [facility].  I will wait on results and possible changes.    Please continue to take care of yourself and each other, and please continue to pray for our recovery from this pandemic.  I am looking forward to seeing you in 2 months.  Advanced happy Father's Day to us!  Regards to your wife, Caro.  She was very helpful in giving us needed information!            0  Return in 2 months.  40 minutes please.  COMPLETE physical examination.  Review preventive strategies, cardiovascular risk.  Coordinate with psychiatry, GI, chronic pain, orthopedics.  Reassess mood, energy, function, preventive strategies.            0

## 2023-05-30 NOTE — PATIENT INSTRUCTIONS
Thank you very much for coming.  I am happy to finally meet you.    I am glad to see that you are following closely with ORTHOPEDICS, as well as CHRONIC PAIN.  I am also glad to hear that you are following with your PSYCHIATRIST.  All of these individuals are very instrumental in getting you back on your feet.    In the meantime, your blood pressure is mildly elevated.  Let us see if this is of any significance.  FASTING blood and urine examinations anytime soon.  You can have these done at [facility].  Let me send word regarding results and possible changes.    In the meantime, drinking lots of fluids, and avoiding salt, these will help with blood pressure control.  Likewise, what ever physical activity you can do, including physical therapy, all of these also help control your blood pressure better.    SMOKING unfortunately also increases blood pressure.  Try and see if you can start cutting back on smoking.  The goal will be to see if you can manage with only 15 cigarettes a day, instead of a full pack.    Please try to see when you have the most cravings for smoking.  We will discuss what options you have to cut back on smoking when you return.  Remember that the best thing that you can do for smoking is to quit eventually.    There is more to investigate.  Your blood count is a little low.  Your liver was showing signs of strain at one-point.  Let us see what your latest laboratory results reveal.    I am glad that you have been clean.  Continue to abstain from any recreational drugs.  Continue to abstain from alcohol as well.    Again, thank you very much for coming.  Please do not hesitate to call with questions or concerns.  Please come back in 2 months, so that we will have robust information to take care of each issue that you may have.  You are 48 years old, and you have inherent risks and inherent recommendations.    Please keep your appointment with COLONOSCOPY to be done at [facility].  I  will wait on results and possible changes.    Please continue to take care of yourself and each other, and please continue to pray for our recovery from this pandemic.  I am looking forward to seeing you in 2 months.  Advanced happy Father's Day to us!  Regards to your wife, Caro.  She was very helpful in giving us needed information!            0  Return in 2 months.  40 minutes please.  COMPLETE physical examination.  Review preventive strategies, cardiovascular risk.  Coordinate with psychiatry, GI, chronic pain, orthopedics.  Reassess mood, energy, function, preventive strategies.            0

## 2023-06-05 ENCOUNTER — PREP FOR PROCEDURE (OUTPATIENT)
Dept: GASTROENTEROLOGY | Age: 48
End: 2023-06-05

## 2023-06-06 RX ORDER — SODIUM CHLORIDE 0.9 % (FLUSH) 0.9 %
5-40 SYRINGE (ML) INJECTION EVERY 12 HOURS SCHEDULED
Status: CANCELLED | OUTPATIENT
Start: 2023-06-06

## 2023-06-06 RX ORDER — SODIUM CHLORIDE 9 MG/ML
INJECTION, SOLUTION INTRAVENOUS PRN
Status: CANCELLED | OUTPATIENT
Start: 2023-06-06

## 2023-06-06 RX ORDER — SODIUM CHLORIDE 9 MG/ML
INJECTION, SOLUTION INTRAVENOUS CONTINUOUS
Status: CANCELLED | OUTPATIENT
Start: 2023-06-06

## 2023-09-08 PROBLEM — S62.111A CLOSED CHIP FRACTURE OF TRIQUETRUM OF RIGHT WRIST: Status: ACTIVE | Noted: 2023-09-08

## 2023-09-08 PROBLEM — J34.3 HYPERTROPHY OF INFERIOR NASAL TURBINATE: Status: ACTIVE | Noted: 2019-03-22

## 2023-09-08 PROBLEM — H52.4 PRESBYOPIA: Status: ACTIVE | Noted: 2017-02-13

## 2023-09-08 PROBLEM — J34.2 NASAL SEPTAL DEVIATION: Status: ACTIVE | Noted: 2017-10-19

## 2023-09-08 PROBLEM — J31.0 CHRONIC RHINITIS: Status: ACTIVE | Noted: 2019-02-21

## 2023-09-08 PROBLEM — H52.203 ASTIGMATISM OF BOTH EYES: Status: ACTIVE | Noted: 2017-02-13

## 2023-09-08 PROBLEM — R31.0 GROSS HEMATURIA: Status: ACTIVE | Noted: 2023-09-08

## 2023-09-08 PROBLEM — R10.2 PELVIC PAIN IN MALE: Status: ACTIVE | Noted: 2023-09-08

## 2023-09-08 RX ORDER — NALOXONE HYDROCHLORIDE 4 MG/.1ML
SPRAY NASAL
COMMUNITY
Start: 2023-06-05

## 2023-09-08 RX ORDER — ALBUTEROL SULFATE 90 UG/1
AEROSOL, METERED RESPIRATORY (INHALATION)
COMMUNITY
Start: 2023-07-21 | End: 2023-11-22 | Stop reason: ALTCHOICE

## 2023-09-08 RX ORDER — DULOXETIN HYDROCHLORIDE 60 MG/1
60 CAPSULE, DELAYED RELEASE ORAL NIGHTLY
COMMUNITY
End: 2024-02-09 | Stop reason: SDUPTHER

## 2023-09-25 DIAGNOSIS — N52.9 ED (ERECTILE DYSFUNCTION) OF ORGANIC ORIGIN: ICD-10-CM

## 2023-09-25 DIAGNOSIS — F31.71 BIPOLAR DISORDER, IN PARTIAL REMISSION, MOST RECENT EPISODE HYPOMANIC (HCC): ICD-10-CM

## 2023-09-26 RX ORDER — QUETIAPINE FUMARATE 100 MG/1
100 TABLET, FILM COATED ORAL NIGHTLY
Qty: 30 TABLET | Refills: 0 | Status: SHIPPED | OUTPATIENT
Start: 2023-09-26

## 2023-09-26 RX ORDER — SILDENAFIL 100 MG/1
TABLET, FILM COATED ORAL
Qty: 30 TABLET | Refills: 0 | Status: SHIPPED | OUTPATIENT
Start: 2023-09-26

## 2023-10-10 DIAGNOSIS — S32.463G: Primary | ICD-10-CM

## 2023-10-10 RX ORDER — OXYCODONE HYDROCHLORIDE 5 MG/1
5 TABLET ORAL 4 TIMES DAILY
Qty: 120 TABLET | Refills: 0 | Status: SHIPPED | OUTPATIENT
Start: 2023-10-10 | End: 2023-11-07 | Stop reason: SDUPTHER

## 2023-10-12 ENCOUNTER — OFFICE VISIT (OUTPATIENT)
Dept: ORTHOPEDIC SURGERY | Facility: HOSPITAL | Age: 48
End: 2023-10-12
Payer: MEDICAID

## 2023-10-12 ENCOUNTER — HOSPITAL ENCOUNTER (OUTPATIENT)
Dept: RADIOLOGY | Facility: HOSPITAL | Age: 48
Discharge: HOME | End: 2023-10-12
Payer: MEDICAID

## 2023-10-12 VITALS — BODY MASS INDEX: 28.35 KG/M2 | HEIGHT: 75 IN | WEIGHT: 228 LBS

## 2023-10-12 DIAGNOSIS — S32.9XXD CLOSED DISPLACED FRACTURE OF PELVIS WITH ROUTINE HEALING, UNSPECIFIED PART OF PELVIS, SUBSEQUENT ENCOUNTER: ICD-10-CM

## 2023-10-12 PROCEDURE — 99213 OFFICE O/P EST LOW 20 MIN: CPT | Performed by: ORTHOPAEDIC SURGERY

## 2023-10-12 PROCEDURE — 72190 X-RAY EXAM OF PELVIS: CPT | Mod: FY

## 2023-10-12 PROCEDURE — 3008F BODY MASS INDEX DOCD: CPT | Performed by: ORTHOPAEDIC SURGERY

## 2023-10-12 PROCEDURE — 72190 X-RAY EXAM OF PELVIS: CPT | Performed by: RADIOLOGY

## 2023-10-12 NOTE — PROGRESS NOTES
Bony Galvan is 6 months post-op from ORIF right both column acetabular fracture and partial cutaneous fixation of posterior pelvis on 4/7/2023. he is doing well at this point.  Pain is well controlled  Denies fevers or chills.  Denies drainage from the wound.  he reports no additional symptoms or concerns. No shortness of breath or chest pain. No calf swelling or pain.    The patients full medical history, surgical history, medications, allergies, family, medical history, social history, and a complete 14 point review of systems is documented in the medical record on the signed, scanned medical intake sheet or reviewed in the history of present illness. Review of systems otherwise negative    No past medical history on file.    Medication Documentation Review Audit       Reviewed by Sterling James MD (Physician) on 05/30/23 at 1240      Medication Order Taking? Sig Documenting Provider Last Dose Status   acetaminophen (Tylenol) 500 mg tablet [id number] Yes Take 2 tablets (1,000 mg) by mouth 3 times a day. Historical Provider, MD  Active   aspirin 81 mg chewable tablet [id number] Yes CHEW AND SWALLOW 1 TABLET BY MOUTH TWICE DAILY Historical Provider, MD  Active   bisacodyl (Dulcolax) 5 mg EC tablet [id number] Yes Take 2 tablets (10 mg) by mouth once daily at bedtime. Historical Provider, MD  Active   busPIRone (Buspar) 15 mg tablet [id number] Yes Take 1 tablet (15 mg) by mouth 3 times a day. Historical Provider, MD  Active   calcium carbonate-vitamin D3 (Oyster Shell) 250 mg-3.125 mcg (125 unit) tablet [id number] Yes Take 2 tablets by mouth 2 times a day. Historical Provider, MD  Active   docusate sodium (Colace) 100 mg capsule [id number] Yes Take 1 capsule (100 mg) by mouth 2 times a day. Historical Provider, MD  Active   DULoxetine (Cymbalta) 30 mg DR capsule [id number] Yes Take 1 capsule (30 mg) by mouth once daily. Historical Provider, MD  Active   gabapentin (Neurontin) 300 mg capsule [id number] Yes Take 1 capsule (300  mg) by mouth 3 times a day. Historical Provider, MD  Active   ibuprofen 800 mg tablet [id number] Yes Take 1 tablet (800 mg) by mouth every 8 hours if needed. Historical Provider, MD  Active   meloxicam (Mobic) 15 mg tablet [id number] Yes Take 1 tablet (15 mg) by mouth once daily. Historical Provider, MD  Active   oxyCODONE (Roxicodone) 5 mg immediate release tablet [id number] Yes Take 1 tablet (5 mg) by mouth every 6 hours if needed. Historical Provider, MD  Active   QUEtiapine (SEROquel) 100 mg tablet [id number] Yes TAKE 1 TO 2 TABLETS BY MOUTH AT BEDTIME AS NEEDED Historical Provider, MD  Active   sildenafil (Viagra) 100 mg tablet [id number]  TAKE 1 TABLET BY MOUTH AS NEEDED FOR ERECTILE DYSFUNCTION. NO MORE THAN 1 TABLET IN 24 HOURS Historical Provider, MD  Active                    No Known Allergies    Social History     Socioeconomic History    Marital status:      Spouse name: Suzi    Number of children: 6    Years of education: Not on file    Highest education level: Not on file   Occupational History    Not on file   Tobacco Use    Smoking status: Every Day     Packs/day: 1.00     Years: 28.00     Additional pack years: 0.00     Total pack years: 28.00     Types: Cigarettes    Smokeless tobacco: Never   Vaping Use    Vaping Use: Not on file   Substance and Sexual Activity    Alcohol use: Not Currently    Drug use: Not Currently    Sexual activity: Not on file   Other Topics Concern    Not on file   Social History Narrative    Not on file     Social Determinants of Health     Financial Resource Strain: Not on file   Food Insecurity: Not on file   Transportation Needs: Not on file   Physical Activity: Not on file   Stress: Not on file   Social Connections: Not on file   Intimate Partner Violence: Not on file   Housing Stability: Not on file       Past Surgical History:   Procedure Laterality Date    IR ANGIOGRAM INFERIOR EPIGASTRIC  4/7/2023    IR ANGIOGRAM INFERIOR EPIGASTRIC [facility] ANGIO    IR ANGIOGRAM  INFERIOR EPIGASTRIC PELVIC  4/7/2023    IR ANGIOGRAM INFERIOR EPIGASTRIC PELVIC [facility] ANGIO    IR ANGIOGRAM INFERIOR EPIGASTRIC PELVIC  4/7/2023    IR ANGIOGRAM INFERIOR EPIGASTRIC PELVIC [facility] ANGIO    IR EMBOLIZATION LYMPH NODE Bilateral 4/7/2023    IR EMBOLIZATION LYMPH NODE [facility] ANGIO       Gen: The patient is alert and oriented ×3, is in no acute distress, and appear their stated age and weight.    Psychiatric: Mood and affect are appropriate.    Eyes: Sclera are white, and pupils are round and symmetric.    ENT: Mucous membranes are moist.     Neck: Supple. Thyroid is midline.    Respiratory: Respirations are nonlabored, chest rise is symmetric.    Cardiac: Rate is regular by palpation of distal pulses.     Abdomen: Nondistended.    Integument: No obvious cutaneous lesions are noted. No signs of lymphangitis. No signs of systemic edema.  side: right lower extremity :  his  surgical incisions are healing well, without evidence of erythema, fluctuance, drainage, or infection.  The skin around the incision is intact.  Distally neurovascular exam is stable.  There is appropriate tenderness to palpation in the soto-incisional area. No calf swelling or tenderness to palpation.      I personally reviewed multiple views of pelvis were obtained in the office today demonstrate maintenance of reduction, interval healing, and a stable position of the hardware.      Bony Galvan is a 48 y.o. male patient status post ORIF right both, acetabular fracture with percutaneous fixation of posterior pelvis on 4/7/2023   I went over his x-rays in detail today.   he is WBAT of the side: right lower extremity. ~He/she~ is range of motion as tolerated of the side: right lower extremity.  I stressed the importance of physical therapy on overall functional outcome. I answered all patient's questions he agrees with treatment plan.  I will see him back in Follow-up 6 month(s)with repeat 5 views of the pelvis including AP inlet outlet and  zeke.  Constantin Griffiths    Department of Orthopaedic Trauma Surgery

## 2023-10-16 ENCOUNTER — APPOINTMENT (OUTPATIENT)
Dept: PHYSICAL THERAPY | Facility: CLINIC | Age: 48
End: 2023-10-16
Payer: MEDICAID

## 2023-10-17 ENCOUNTER — APPOINTMENT (OUTPATIENT)
Dept: OTOLARYNGOLOGY | Facility: CLINIC | Age: 48
End: 2023-10-17
Payer: MEDICAID

## 2023-10-23 ENCOUNTER — EVALUATION (OUTPATIENT)
Dept: PHYSICAL THERAPY | Facility: CLINIC | Age: 48
End: 2023-10-23
Payer: MEDICAID

## 2023-10-23 DIAGNOSIS — R10.2 PELVIC PAIN IN MALE: Primary | ICD-10-CM

## 2023-10-23 DIAGNOSIS — S32.421D CLOSED DISPLACED FRACTURE OF POSTERIOR WALL OF RIGHT ACETABULUM WITH ROUTINE HEALING, SUBSEQUENT ENCOUNTER: ICD-10-CM

## 2023-10-23 PROCEDURE — 97110 THERAPEUTIC EXERCISES: CPT | Mod: GP | Performed by: PHYSICAL THERAPIST

## 2023-10-23 PROCEDURE — 97162 PT EVAL MOD COMPLEX 30 MIN: CPT | Mod: GP | Performed by: PHYSICAL THERAPIST

## 2023-10-23 NOTE — Clinical Note
October 23, 2023     Patient: Bony Galvan   YOB: 1975   Date of Visit: 10/23/2023       To Whom it May Concern:    Bony Galvan was seen in my clinic on 10/23/2023. He {Return to school/sport:00327}.    If you have any questions or concerns, please don't hesitate to call.         Sincerely,          Vanessa Edmond, PT        CC: No Recipients

## 2023-10-23 NOTE — Clinical Note
October 23, 2023     Patient: Bony Galvan   YOB: 1975   Date of Visit: 10/23/2023       To Whom It May Concern:    It is my medical opinion that Bony Galvan {Work release (duty restriction):07489}.    If you have any questions or concerns, please don't hesitate to call.         Sincerely,        Vanessa Edmond, PT    CC: No Recipients

## 2023-10-23 NOTE — PROGRESS NOTES
Physical Therapy Evaluation and Treatment      Patient Name: Bony Galvan  MRN: [id number]  Today's Date: 10/23/2023  Time Calculation  Start Time: 1710  Stop Time: 1752  Time Calculation (min): 42 min      INSURANCE:  Visit number: 1/10  Limit of 30 Visits    Surgery: ORIF of right both, acetabular fracture with percutaneous fixation of posterior pelvis on 4/7/2023  Referring Provider: Dr. Constantin Griffiths    ASSESSMENT:  PT Assessment Results: decreased knowledge of HEP, activity limitations, ADLs/IADLs/self care skills, flexibility, motor function/control/tone, pain, participation restrictions, range of motion/joint mobility, strength, posture, coordination, integumentary, decreased knowledge of precautions, gait/locomotion, balance, fall risk, transfers, decreased knowledge of assisted device use.  Rehab Prognosis: Fair  Evaluation/Treatment Tolerance: Patient limited by pain    Bony Galvan is a 48 y.o. male presenting to the clinic with ORIF of right both, acetabular fracture with percutaneous fixation of posterior pelvis on 4/7/2023 by Dr. Constantin Griffiths. Pt demonstrates decreased ROM and strength of the B hips/pelvis and abdominals causing pain and dysfunction with walking, standing, squatting, bending, lifting, and stairs. Pt was given and reviewed HEP including stretching. Pt will benefit from skilled PT in order to increase ROM and strength of the B hips/pelvis and abdominals so that the pt can perform ADLs without pain and return to PLOF.     PLAN:  Treatments/Interventions: aquatic, work conditioning, mechanical traction, gait training, dry needling, edema control, education/instruction, home program, self care/home management, manual therapy, neuromuscular re-education, therapeutic activities, therapeutic exercises, modalities, therapeutic elastic taping.   PT Plan: Skilled PT  PT Frequency: 2 times per week  Duration: 10 visits  Rehab Potential: Fair  Plan of Care Agreement: Patient    Goals -    STG - After  about 6 weeks:  Pt will report less than a 4/10 pain at the worst.  Pt will be able to manage changes in intra-abdominal pressure with appropriate pelvic and TA muscle activation.  Pt will be able to coordinate pelvic/TA muscles with thoracic diaphragm during functional activities that cause symptoms.  Pt will increase by 1 MMT grade for B hips.  Pt will improve LEFS score by 7 points.    LTG - after about 12 weeks:  Pt will report less than a 0-2/10 pain at the worst.  Pt will have at least 4+/5 to 5/5 strength for B hips.   Pt will have AROM to WFL for the lumbar spine.   Pt will report a significant improvement with LEFS score (at least 9 points).  Pt will be independent with progressed HEP.    CURRENT PROBLEM:   1. Pelvic pain in male  Follow Up In Physical Therapy      2. Closed displaced fracture of posterior wall of right acetabulum with routine healing, subsequent encounter  Follow Up In Physical Therapy          Subjective    General -    Pelvis and hip fracture  Fell out of a tree  ORIF of right both, acetabular fracture with percutaneous fixation of posterior pelvis on 4/7/2023    Mechanism of Injury -  Fall    History of Current Episode - 4/6/23    Pain -    Pain Location: pelvis   Pain Best: 4-5/10  Pain Today: 7/10  Pain Worst: 7/10   Pain Type: Constant, Achy/Dull, Nagging, Sharp, Stiffness/Tightness, Numbness, Tingling    Pain Exacerbating Factors: walking, standing, squatting, bending, lifting, and stairs.  Pain Relieving Factors:   Rx - oxycodone, Gabapentin, Acetaminophen, Ibuprofen, Duloxetine  Rest    Difficulty Sleeping: Yes  Night Sweats, Loss of Appetite, Unexpected Weight-loss: Yes  Saddle/Bowel/Bladder: No    Work -   , but off work     Home Living -    Stairs into Home: 5 steps with railing, but has a ramp  Stairs in Home: 1 flight each to basement and 2nd floor without railing, but mostly living on the first floor.  Pt lives with his wife Suzi who accompanied him this  visit.  DME: shower chair and toilet seat riser    PRECAUTIONS -    WBAT    Prior Level of Function -    I with ADLs/IADLs     Objective     Hip AROM (degrees) - WFL grossly except reduced IR and extension B    Hip MMT (/5) -  R hip Flexion: 4   R hip ER: 3+   R hip IR: 2  R hip Adduction: 4-  R hip Abduction: 3   L hip Flexion: 4 with pain  L hip ER: 3+   L hip IR: 2  L hip Adduction: 4-  L hip Abduction: 3     Hip Knee (/5) -   R knee Extension: 4  R knee Flexion: 4+  L knee Extension: 4  L knee Flexion: 4    Lumbar AROM by Percent -  DNT due to pain      Posture -   Lower Crossed Syndrome     Functional Assessment -   SLS: Trendelenburg L positive   Breathing: slightly reduced excursion    Hip/Lumbar Special Tests -  VIRGIL positive bilateral  Oj positive bilateral    Flexibility -   Decreased Tissue Length of: Iliopsoas, Adductors, HS, and piriformis    Palpation - Consent to External Palpation Verbally Given  Pelvic Alignment: plan to assess next visit  Iliopsoas TTP bilateral    Integumentary -   Well healed incision, with increased adhesions centrally and inferiorly on R scar     Gait -  Noted: antalgic, decreased heel strike, decreased toe push off, Trendelenburg, and trunk lean.     Outcome Measures -   Other Measures  Lower Extremity Funtional Score (LEFS): 13/80     Treatment -   Evaluation -   Moderate  Therapeutic Exercise -    Single knee to chest stretch: 15 sec ea  Supine figure 4 piriformis stretch: 15 sec ea  TA brace: 5 sec x5  Seated piriformis stretch with step: 15 sec x2  Diaphragmatic Breathin min  Educated on Scar massage    OP Patient Education -   Access Code: [email or web address]  URL: [email or web address]  Date: 10/23/2023  Prepared by: Vanessa Edmond    Exercises  - Supine Single Knee to Chest  - 1-2 x daily - 5-7 x weekly - 4 sets - 15 sec hold  - Supine Figure 4 Piriformis Stretch  - 1-2 x daily - 5-7 x weekly - 4 sets - 15 sec hold  - Seated Transversus Abdominis  Bracing  - 1-2 x daily - 5-7 x weekly - 2 sets - 10 reps - 5 sec hold  Seated Piriformis stretch with step: 15 sec x4, 1-2 times a day    Verbally educated on diaphragmatic breathing and scar massage.

## 2023-10-24 ENCOUNTER — APPOINTMENT (OUTPATIENT)
Dept: OTOLARYNGOLOGY | Facility: CLINIC | Age: 48
End: 2023-10-24
Payer: MEDICAID

## 2023-10-30 NOTE — PROGRESS NOTES
"Physical Therapy Treatment    Patient Name: Bony Galvan  MRN: [id number]  Today's Date: 10/31/2023  Time Calculation  Start Time: 427  Stop Time: 050  Time Calculation (min): 42 min    Current Problem  1. Pelvic pain in male        2. Closed displaced fracture of posterior wall of right acetabulum with routine healing, subsequent encounter            Insurance:  Visit number: 2/10  Limit of 30 Visits  Precautions   WBAT    Subjective   Subjective:   Pt reports that he still has pain from B mid thigh to abdominals. Pt usually feels the best when laying on his back  Pain   7/10    Objective   Treatments:  Single knee to chest stretch: 10x ea  LTR 10x 5\" ea  Supine figure 4 piriformis stretch: 15 sec ea  TA brace: 5 sec x10  Hooklying hip abd YTB  S/l hip abd B x7  S/l clamshells x7  Bridges 7x  LAQ w/ball add B 10x ea  Step ups 4in F/L   Seated piriformis stretch with step: 15 sec x2---  Diaphragmatic Breathin min--  Educated on Scar massage     OP EDUCATION:     Access Code: [email or web address]  URL: [email or web address]  Date: 10/31/2023  Prepared by: Lisandra Rainey    Exercises  - Supine Bridge  - 1 x daily - 7 x weekly - 10 reps  - Clamshell  - 1 x daily - 7 x weekly - 10 reps    Assessment:   Pt displayed tightness in LE with stretches. Introduced TB hip abd for gentle hip activation. Pt had more difficulty performing fwd step ups with L LE vs R LE. Tactile cues provided s/l hip ex to not allow the hips to rock posterior. Pt had some increased pain with LAQ. Fair overall completion of ex given.     Plan:   Gentle hip ROM and strengthening.               "

## 2023-10-31 ENCOUNTER — TREATMENT (OUTPATIENT)
Dept: PHYSICAL THERAPY | Facility: CLINIC | Age: 48
End: 2023-10-31
Payer: MEDICAID

## 2023-10-31 DIAGNOSIS — R10.2 PELVIC PAIN IN MALE: Primary | ICD-10-CM

## 2023-10-31 DIAGNOSIS — S32.421D CLOSED DISPLACED FRACTURE OF POSTERIOR WALL OF RIGHT ACETABULUM WITH ROUTINE HEALING, SUBSEQUENT ENCOUNTER: ICD-10-CM

## 2023-10-31 PROCEDURE — 97110 THERAPEUTIC EXERCISES: CPT | Mod: GP,CQ

## 2023-11-02 ENCOUNTER — APPOINTMENT (OUTPATIENT)
Dept: PHYSICAL THERAPY | Facility: CLINIC | Age: 48
End: 2023-11-02
Payer: MEDICAID

## 2023-11-07 ENCOUNTER — TELEPHONE (OUTPATIENT)
Dept: PHYSICAL THERAPY | Facility: CLINIC | Age: 48
End: 2023-11-07
Payer: MEDICAID

## 2023-11-07 DIAGNOSIS — S32.463G: ICD-10-CM

## 2023-11-07 RX ORDER — OXYCODONE HYDROCHLORIDE 5 MG/1
5 TABLET ORAL 4 TIMES DAILY
Qty: 120 TABLET | Refills: 0 | Status: SHIPPED | OUTPATIENT
Start: 2023-11-07 | End: 2023-12-04 | Stop reason: SDUPTHER

## 2023-11-07 NOTE — TELEPHONE ENCOUNTER
RETURNED THE 2 VOICEMAILS THAT TERRA LEFT HE DID NOT ANSWER THE CALL BACK I LEFT OUR OFFICE PHONE # FOR HIM TO CALL ALONG WITH THE OPTION TO CALL CENTAL SCHEDULING IF HE COULDN'T GET A HOLD OF ANYONE IN OUR OFFICE

## 2023-11-07 NOTE — TELEPHONE ENCOUNTER
Pt called for medication refill OXYCODONE 5 MG TAKE ONE TABLET 4 TIMES DAILY TO BE SENT TO PHARMACY

## 2023-11-09 ENCOUNTER — APPOINTMENT (OUTPATIENT)
Dept: OTOLARYNGOLOGY | Facility: CLINIC | Age: 48
End: 2023-11-09
Payer: MEDICAID

## 2023-11-20 ENCOUNTER — APPOINTMENT (OUTPATIENT)
Dept: PRIMARY CARE | Facility: CLINIC | Age: 48
End: 2023-11-20
Payer: MEDICAID

## 2023-11-22 ENCOUNTER — OFFICE VISIT (OUTPATIENT)
Dept: PRIMARY CARE | Facility: CLINIC | Age: 48
End: 2023-11-22
Payer: MEDICAID

## 2023-11-22 VITALS
SYSTOLIC BLOOD PRESSURE: 134 MMHG | BODY MASS INDEX: 27.85 KG/M2 | HEART RATE: 83 BPM | OXYGEN SATURATION: 94 % | HEIGHT: 75 IN | DIASTOLIC BLOOD PRESSURE: 78 MMHG | WEIGHT: 224 LBS

## 2023-11-22 DIAGNOSIS — F31.71 BIPOLAR DISORDER, IN PARTIAL REMISSION, MOST RECENT EPISODE HYPOMANIC (MULTI): ICD-10-CM

## 2023-11-22 DIAGNOSIS — K59.03 DRUG-INDUCED CONSTIPATION: ICD-10-CM

## 2023-11-22 DIAGNOSIS — F90.2 ATTENTION DEFICIT HYPERACTIVITY DISORDER (ADHD), COMBINED TYPE: ICD-10-CM

## 2023-11-22 DIAGNOSIS — F43.10 PTSD (POST-TRAUMATIC STRESS DISORDER): ICD-10-CM

## 2023-11-22 DIAGNOSIS — J20.9 ACUTE BRONCHITIS, UNSPECIFIED ORGANISM: ICD-10-CM

## 2023-11-22 DIAGNOSIS — Z11.59 ENCOUNTER FOR HEPATITIS C SCREENING TEST FOR LOW RISK PATIENT: ICD-10-CM

## 2023-11-22 DIAGNOSIS — R73.9 HYPERGLYCEMIA: ICD-10-CM

## 2023-11-22 DIAGNOSIS — D64.9 ANEMIA, NORMOCYTIC NORMOCHROMIC: ICD-10-CM

## 2023-11-22 DIAGNOSIS — R74.01 TRANSAMINASEMIA: ICD-10-CM

## 2023-11-22 DIAGNOSIS — F17.218 CIGARETTE NICOTINE DEPENDENCE WITH OTHER NICOTINE-INDUCED DISORDER: ICD-10-CM

## 2023-11-22 DIAGNOSIS — D72.819 LEUKOPENIA, UNSPECIFIED TYPE: ICD-10-CM

## 2023-11-22 DIAGNOSIS — R03.0 BLOOD PRESSURE ELEVATED WITHOUT HISTORY OF HTN: ICD-10-CM

## 2023-11-22 DIAGNOSIS — B07.9 VIRAL WART ON FINGER: ICD-10-CM

## 2023-11-22 DIAGNOSIS — G89.4 CHRONIC PAIN SYNDROME: ICD-10-CM

## 2023-11-22 DIAGNOSIS — R53.81 DEBILITY: Primary | ICD-10-CM

## 2023-11-22 PROBLEM — R10.2 PELVIC PAIN IN MALE: Status: RESOLVED | Noted: 2023-09-08 | Resolved: 2023-11-22

## 2023-11-22 PROBLEM — K59.09 CHRONIC CONSTIPATION: Status: RESOLVED | Noted: 2023-05-30 | Resolved: 2023-11-22

## 2023-11-22 PROBLEM — F17.210 DEPENDENCE ON NICOTINE FROM CIGARETTES: Status: RESOLVED | Noted: 2023-05-30 | Resolved: 2023-11-22

## 2023-11-22 PROBLEM — R31.0 GROSS HEMATURIA: Status: RESOLVED | Noted: 2023-09-08 | Resolved: 2023-11-22

## 2023-11-22 PROCEDURE — 3008F BODY MASS INDEX DOCD: CPT | Performed by: INTERNAL MEDICINE

## 2023-11-22 PROCEDURE — 99214 OFFICE O/P EST MOD 30 MIN: CPT | Performed by: INTERNAL MEDICINE

## 2023-11-22 PROCEDURE — 4004F PT TOBACCO SCREEN RCVD TLK: CPT | Performed by: INTERNAL MEDICINE

## 2023-11-22 RX ORDER — AMOXICILLIN AND CLAVULANATE POTASSIUM 875; 125 MG/1; MG/1
TABLET, FILM COATED ORAL
Qty: 20 TABLET | Refills: 0 | Status: SHIPPED | OUTPATIENT
Start: 2023-11-22 | End: 2024-06-06 | Stop reason: ALTCHOICE

## 2023-11-22 RX ORDER — BUPROPION HYDROCHLORIDE 75 MG/1
75 TABLET ORAL DAILY
COMMUNITY
Start: 2023-11-07

## 2023-11-22 RX ORDER — ATOMOXETINE 10 MG/1
CAPSULE ORAL
COMMUNITY
Start: 2023-11-21 | End: 2024-06-06 | Stop reason: ALTCHOICE

## 2023-11-22 RX ORDER — GUAIFENESIN 1200 MG/1
TABLET, EXTENDED RELEASE ORAL
Qty: 20 TABLET | Refills: 0 | Status: SHIPPED | OUTPATIENT
Start: 2023-11-22 | End: 2023-12-14

## 2023-11-22 NOTE — PATIENT INSTRUCTIONS
Thank you much for coming.  I am very happy to see you.  It has been quite sometime since I last saw you in MAY.  You have yet to do your FASTING laboratory examinations.  You can have this done at any  facility.  Once you have it done, I can send word regarding any results and possible changes.    In the meantime, I do understand that you are now applying for DISABILITY.  Go ahead and send the paperwork over to me, and I will fill it to the best of my ability.  Your disability though is related to your ACCIDENTS, and should be filled out more fully by your BONE SURGEON, as well as perhaps your physical therapist.  Likewise, your disability is related to your MENTAL STATE, and will more adequately be filled by your PSYCHIATRIST.    Still, if the paperwork is sent to me, I will fill it out to the best of my ability.    In the meantime, you do have a significant CONGESTION in your chest, but your lungs remain clear.  No need for chest x-ray.  You need to drink lots of fluids, and get lots of rest and sleep.  You also need to stop smoking, at least for the next 3 days.  This way, you will have time to get better.  Afterwards, if you feel like starting smoking again, you can start slow, perhaps 5 cigarettes a day at most.    Please continue to work with your PSYCHIATRIST, especially with your WELLBUTRIN.  This will be very helpful in helping you cut back on smoking, or perhaps even quit.    AMOXICILLIN/CLAVULANATE antibiotic please, 875 mg.  Take with breakfast, and again with supper.  Keep your doses 10 to 12 hours apart.  Drink lots of fluids throughout the day.  I will authorize 10 days of this antibiotic.    Likewise, let us get you going with all Mucinex/GUAIFENESIN 1200 mg, take 1 tablet by mouth twice a day.  Keep your doses 10 to 12 hours apart as well.    Very important and very helpful, lots of fluids throughout the day.  Hot tea with honey will help greatly.  Likewise, honey lemon drops will help soothe  your throat.  Warm salt water gargles will decrease the congestion and phlegm in your throat.    Lots of rest and sleep please.    Again, most importantly, you need to give yourself a break from SMOKING.    You also will benefit from FASTING laboratory examinations to be done at any  facility.  I will send word regarding results and possible changes.  In the past, you had a history of ANEMIA or low red cell count, likely related to your injuries.  You also had a low white cell count, which is not as easily explained.  We will investigate further.    Also, your LIVER was showing signs of strain.  Anything that you put in your mouth of course has to go through your liver, including your medications for pain.  This also includes ALCOHOL.  If you can help it, cut back on alcohol intake to perhaps 1 drink a day at most.  Try your best.    There is a lot more to discuss, but let us start out with all of these.    By the way, there is a bandage with medication for VIRAL WARTS.  Look into this, and get yourself a pack and use it on the wart on your finger.    Again, thank you very much for coming.  Please get your laboratory examinations done.  There is more to talk about, but lets start with these.  Let me see you again in 1 month at the latest.  Until then, please continue taking care of yourself and your family, and please continue caring for our recovery from this pandemic.    Once you are better, consider getting vaccinated for INFLUENZA, then after 2 weeks, consider getting vaccinated for COVID.  Get better first with your congestion and bronchitis.    I hope you have a good Thanksgiving and a good [place].  See you in about 1 month.            0  Return in about 1 month.  20 minutes please.  Reassess compliance, tolerance, coordinate with psychiatry, orthopedics, physical therapy.  Reviewed disability claims.  Coordinate with pain specialist, physical therapy, and in the past, was also seen by urology,  ENT.            0

## 2023-11-22 NOTE — PROGRESS NOTES
Subjective   Patient ID: Bony Galvan is a 48 y.o. male who presents for routine FU (Pt in today for routine FU).    HPI   Remarkable debility initially noted when patient presented in May 2023.  Has since been attending closely with ORTHOPEDICS, as well as PHYSICAL THERAPY.  Has had increase in overall function, now ambulating without the need of assistive device, and with no recent falls.  No headache, blurred vision, diplopia.  No dysphagia.  No focal weakness, except that related to pain.  No recent falls.  Not worried about memory.  No confusion or delirium.    Continues to want to improve quality of life, and gain further independence.  Does not wish harm to self or others.  Following closely with PSYCHIATRY, now given established diagnoses of POST TRAUMATIC STRESS DISORDER, as well as BIPOLAR MOOD DISORDER.  With history of smoking, the patient was placed on low-dose BUPROPION/WELLBUTRIN to work with his STRATTERA the patient is happy, and states that it took 50 years for somebody to figure out what was wrong with him!  He states that now, he is able to focus on work at hand.    He also does state that bupropion seems to make his cigarettes taste bad, and instead of quitting smoking, he is considering stopping bupropion, which I of course discouraged.  I pointed out that his psychoactive medications work together, and stopping one, especially without discussing this with his psychiatrist, might actually make him feel remarkably worse.  Right now, he states that he is feeling great, the best that he has felt in a long while.    Unfortunately, the patient continues to smoke up to 1 and half packs of cigarettes each day.  He has been struggling with CHEST CONGESTION, coughing, yellowish sputum production, for which he found in his home  AMOXICILLIN antibiotic, which he was able to tolerate.  No diarrhea, although he feels that it has not really helped his congestion.  He has also started himself on  "MUCINEX, unclear if this is also .  He understands not to take medication without supervision, especially antibiotics.    Denies fever.  No particular sinus congestion.  No odynophagia or dysphagia.  No jorge alberto substernal chest pain, no orthopnea, no paroxysmal nocturnal dyspnea.  Some minimal dyspnea on exertion, but no apparent wheezing, and no hoarseness of voice.  CONSTITUTIONALLY, no fever, no chills.  No night sweats.  No lingering anorexia or nausea.  No apparent lymphadenopathy.  No apparent weight loss.    The patient does follow closely with CHRONIC PAIN as well, and states that the subsequent CONSTIPATION is manageable.  Appetite preserved, no nausea, vomiting, no abdominal distress.  No diarrhea, no apparent blood or mucus in stool.  Likewise, no dysuria, flank, suprapubic pain.  No overlying skin changes, except for a chronic cystic lump on the finger of his right hand.  No other rashes, no pruritus.  No jaundice.  No easy bruisability.    Weight gain noted.  ENDOCRINE with no polyuria, polydipsia, polyphagia.  No blurred vision.  No skin, hair, nail changes.  No dramatic weight loss or weight gain.          Review of Systems  Review of systems as in history of present illness, and otherwise, reviewed separately as well, and was unremarkable/negative/noncontributory.          Objective   /78 (BP Location: Right arm, Patient Position: Sitting)   Pulse 83   Ht 1.905 m (6' 3\")   Wt 102 kg (224 lb)   SpO2 94%   BMI 28.00 kg/m²     Physical Exam  In good spirits.  Eager to get better.  Not in distress or diaphoresis.  Alert, oriented x 3.  Amiable.  Not unkempt.  Moderately built.  Receptive, cheerful, appropriate.  Not wishing harm to self or others.    HEAD pink palpebral conjunctivae, anicteric sclerae.  Mucous membranes moist.  No tonsillopharyngeal congestion, no thrush.  No sinus or tragal tenderness.  NECK supple, no apparent jugular venous distention.  No carotid " bruit.  CARDIOVASCULAR not in distress or diaphoresis.  No bipedal edema.  Regular rate and rhythm.  No murmurs.  LUNGS not in distress or diaphoresis.  Not using accessory muscles.  Moderate air exchange at best, with currently no adventitious sounds noted.  ABDOMEN soft, nontender.  BACK no costovertebral angle tenderness.  EXTREMITIES no clubbing, no cyanosis.  NEURO no facial asymmetry.  No apparent cranial nerve deficits.  Romberg negative.  Ambulating without need of assistance.  No apparent focal weakness.  No tremors.  PSYCH receptive, appropriate, and eager to maintain and improve quality of life.      LABORATORY results reviewed with patient.        Assessment/Plan   Diagnoses and all orders for this visit:  Debility  -     Follow Up In Primary Care - [facility]; Future  Acute bronchitis, unspecified organism  -     amoxicillin-pot clavulanate (Augmentin) 875-125 mg tablet; Please take 1 tablet by mouth please take 1 tablet by mouth with breakfast, and again 1 tablet by mouth with supper.  Keep doses 10 to 12 hours apart.  Lots of fluids throughout the day.  Thank you.  -     guaiFENesin (Mucinex) 1,200 mg tablet extended release 12hr; Please take 1 tablet by mouth with breakfast, and again 1 tablet by mouth with supper.  Keep your doses 10 to 12 hours apart.  Lots of fluids throughout the day.  Thank you.  -     Follow Up In Primary Care - [facility]; Future  Cigarette nicotine dependence with other nicotine-induced disorder  -     Follow Up In Primary Care [facility]; Future  Blood pressure elevated without history of HTN  -     Follow Up In Primary Care [facility]; Future  Hyperglycemia  -     Follow Up In Primary Care [facility]; Future  Transaminasemia  -     Follow Up In Primary Care [facility]; Future  Anemia, normocytic normochromic  -     Follow Up In Primary Care [facility]; Future  Leukopenia, unspecified type  -     Follow Up In Primary Care [facility]; Future  Viral wart on  finger  -     Follow Up In Primary Care - Established; Future  Drug-induced constipation  -     Follow Up In Primary Care - Established; Future  Encounter for hepatitis C screening test for low risk patient  -     Follow Up In Primary Care - Established; Future  Chronic pain syndrome  -     Follow Up In Primary Care - Established; Future  PTSD (post-traumatic stress disorder)  -     Follow Up In Primary Care - [facility]; Future  Bipolar disorder, in partial remission, most recent episode hypomanic (CMS/HCC)  -     Follow Up In Primary Care - Established; Future  Attention deficit hyperactivity disorder (ADHD), combined type  -     Follow Up In Primary Care - Established; Future       Thank you much for coming.  I am very happy to see you.  It has been quite sometime since I last saw you in [place].  You have yet to do your FASTING laboratory examinations.  You can have this done at any  facility.  Once you have it done, I can send word regarding any results and possible changes.    In the meantime, I do understand that you are now applying for DISABILITY.  Go ahead and send the paperwork over to me, and I will fill it to the best of my ability.  Your disability though is related to your ACCIDENTS, and should be filled out more fully by your BONE SURGEON, as well as perhaps your physical therapist.  Likewise, your disability is related to your MENTAL STATE, and will more adequately be filled by your PSYCHIATRIST.    Still, if the paperwork is sent to me, I will fill it out to the best of my ability.    In the meantime, you do have a significant CONGESTION in your chest, but your lungs remain clear.  No need for chest x-ray.  You need to drink lots of fluids, and get lots of rest and sleep.  You also need to stop smoking, at least for the next 3 days.  This way, you will have time to get better.  Afterwards, if you feel like starting smoking again, you can start slow, perhaps 5 cigarettes a day at most.    Please  continue to work with your PSYCHIATRIST, especially with your WELLBUTRIN.  This will be very helpful in helping you cut back on smoking, or perhaps even quit.    AMOXICILLIN/CLAVULANATE antibiotic please, 875 mg.  Take with breakfast, and again with supper.  Keep your doses 10 to 12 hours apart.  Drink lots of fluids throughout the day.  I will authorize 10 days of this antibiotic.    Likewise, let us get you going with all Mucinex/GUAIFENESIN 1200 mg, take 1 tablet by mouth twice a day.  Keep your doses 10 to 12 hours apart as well.    Very important and very helpful, lots of fluids throughout the day.  Hot tea with honey will help greatly.  Likewise, honey lemon drops will help soothe your throat.  Warm salt water gargles will decrease the congestion and phlegm in your throat.    Lots of rest and sleep please.    Again, most importantly, you need to give yourself a break from SMOKING.    You also will benefit from FASTING laboratory examinations to be done at any  facility.  I will send word regarding results and possible changes.  In the past, you had a history of ANEMIA or low red cell count, likely related to your injuries.  You also had a low white cell count, which is not as easily explained.  We will investigate further.    Also, your LIVER was showing signs of strain.  Anything that you put in your mouth of course has to go through your liver, including your medications for pain.  This also includes ALCOHOL.  If you can help it, cut back on alcohol intake to perhaps 1 drink a day at most.  Try your best.    There is a lot more to discuss, but let us start out with all of these.    By the way, there is a bandage with medication for VIRAL WARTS.  Look into this, and get yourself a pack and use it on the wart on your finger.    Again, thank you very much for coming.  Please get your laboratory examinations done.  There is more to talk about, but lets start with these.  Let me see you again in 1 month at the  latest.  Until then, please continue taking care of yourself and your family, and please continue caring for our recovery from this pandemic.    Once you are better, consider getting vaccinated for INFLUENZA, then after 2 weeks, consider getting vaccinated for COVID.  Get better first with your congestion and bronchitis.    I hope you have a good Thanksgiving and a good [place].  See you in about 1 month.            0  Return in about 1 month.  20 minutes please.  Reassess compliance, tolerance, coordinate with psychiatry, orthopedics, physical therapy.  Reviewed disability claims.  Coordinate with pain specialist, physical therapy, and in the past, was also seen by urology, ENT.            0

## 2023-12-04 DIAGNOSIS — S32.463G: ICD-10-CM

## 2023-12-04 RX ORDER — OXYCODONE HYDROCHLORIDE 5 MG/1
5 TABLET ORAL 4 TIMES DAILY
Qty: 120 TABLET | Refills: 0 | Status: SHIPPED | OUTPATIENT
Start: 2023-12-04 | End: 2024-01-04 | Stop reason: SDUPTHER

## 2023-12-08 ENCOUNTER — APPOINTMENT (OUTPATIENT)
Dept: PHYSICAL THERAPY | Facility: CLINIC | Age: 48
End: 2023-12-08
Payer: MEDICAID

## 2023-12-13 DIAGNOSIS — J20.9 ACUTE BRONCHITIS, UNSPECIFIED ORGANISM: ICD-10-CM

## 2023-12-14 RX ORDER — GUAIFENESIN 1200 MG/1
TABLET, EXTENDED RELEASE ORAL
Qty: 20 TABLET | Refills: 0 | Status: SHIPPED | OUTPATIENT
Start: 2023-12-14

## 2024-01-04 DIAGNOSIS — S32.463G: ICD-10-CM

## 2024-01-04 RX ORDER — OXYCODONE HYDROCHLORIDE 5 MG/1
5 TABLET ORAL 4 TIMES DAILY
Qty: 120 TABLET | Refills: 0 | Status: SHIPPED | OUTPATIENT
Start: 2024-01-04 | End: 2024-02-07 | Stop reason: SDUPTHER

## 2024-01-05 ENCOUNTER — TREATMENT (OUTPATIENT)
Dept: PHYSICAL THERAPY | Facility: CLINIC | Age: 49
End: 2024-01-05
Payer: MEDICAID

## 2024-01-05 DIAGNOSIS — S32.421D CLOSED DISPLACED FRACTURE OF POSTERIOR WALL OF RIGHT ACETABULUM WITH ROUTINE HEALING, SUBSEQUENT ENCOUNTER: ICD-10-CM

## 2024-01-05 DIAGNOSIS — R10.2 PELVIC PAIN IN MALE: Primary | ICD-10-CM

## 2024-01-05 PROCEDURE — 97110 THERAPEUTIC EXERCISES: CPT | Mod: GP | Performed by: PHYSICAL THERAPIST

## 2024-01-05 NOTE — PROGRESS NOTES
Physical Therapy Treatment    Patient Name: Bony Galvan  MRN: [id number]  Today's Date: 1/6/24  Time Calculation  Start Time: 0135  Stop Time: 0210  Time Calculation (min): 35 min    INSURANCE:  Visit number: 3/10  Limit of 30 Visits     Surgery: ORIF of right both, acetabular fracture with percutaneous fixation of posterior pelvis on 4/7/2023  Referring Provider: Dr. Constantin Griffiths    CURRENT PROBLEM:  1. Pelvic pain in male        2. Closed displaced fracture of posterior wall of right acetabulum with routine healing, subsequent encounter          SUBJECTIVE:   General -   Pt is doing home exercises.  Wants to get a walk in bathtub covered by insurance since he thinks it will help his pain.  Spoke to patient about aquatic therapy.    Pain -    Pain Location: Pelvis and B hips  Pain Best: 6/10  Pain Today: 6/10  Pain Worst: 10/10   Difficulty Sleeping: Yes    Precautions -    WBAT    OBJECTIVE:   Hip AROM (degrees) - Reduced L hip flexion, ER, and B ext/IR.     Hip MMT (/5) -  R hip Flexion: 4-  R hip ER: 4-  R hip IR: 2  R hip Adduction: 4-  R hip Abduction: 3   L hip Flexion: 3  L hip ER: 3  L hip IR: 2  L hip Adduction: 4-  L hip Abduction: 3      Hip Knee (/5) -   R knee Extension: 4+  R knee Flexion: 4+  L knee Extension: 3  L knee Flexion: 4     Lumbar AROM by Percent -  Lumbar Flexion: 40% with pain  Lumbar Extension: 10% with pain  Lumbar Side bending R: 50% with pain  Lumbar Side bending L: 40% with pain    Posture -   Lower Crossed Syndrome      Functional Assessment -   SLS: Trendelenburg L positive   Breathing: slightly reduced excursion     Hip/Lumbar Special Tests -  VIRGIL positive bilateral  Oj positive bilateral     Flexibility -   Decreased Tissue Length of: Iliopsoas, Adductors, HS, and piriformis     Palpation - Consent to External Palpation Verbally Given  Iliopsoas TTP bilateral     Gait -  Noted: antalgic, Trendelenburg, and trunk lean.     Outcome Measures -    LEFS 17/80    Treatment -    Therapeutic Exercise (32431) -  Assessment  Standing Hip Flexor Stretch  Self myofascial pulling to scar  Manual Therapy (14434) -    Myofascial release around scar     OP Patient Education -   Standing Hip Flexor Stretch  Self manual pulling to stretch scar    ASSESSMENT:     Pt is progressing slightly towards his goals, but pt still has high levels of pain, especially when performing exercises. Pt still exhibits deficits as shown by his LEFS with ADLs. Plan to have patient continue with skilled therapy in aquatic pool for ability to perform exercises in a less harsh environment. Pt will benefit from continued skilled aquatic PT in order to further improve ROM and strength of the pelvis/B hips and abdominals so that the pt can perform ADLs without pain and return to OF.    PLAN:   Treatments/Interventions: aquatic, gait training, edema control, education/instruction, home program, self care/home management, manual therapy, neuromuscular re-education, therapeutic activities, therapeutic exercises, modalities, therapeutic elastic taping.   OP PT Plan  PT Plan: Skilled PT  PT Frequency: 2 times per week  Duration: 20 visits  Rehab Potential: Fair  Plan of Care Agreement: Patient  Plan to transfer care to [facility] for aquatic therapy, then re-assess.    Goals -   STG - After about 6 weeks:  Pt will report less than a 4/10 pain at the worst. (Goal PROGRESSING)    Pt will be able to manage changes in intra-abdominal pressure with appropriate pelvic and TA muscle activation. (Goal PROGRESSING)    Pt will be able to coordinate pelvic/TA muscles with thoracic diaphragm during functional activities that cause symptoms. (Goal PROGRESSING)    Pt will increase by 1 MMT grade for B hips. (Goal Not Met)    Pt will improve LEFS score by 7 points. (Goal PROGRESSING)       LTG - after about 12 weeks:  Pt will report less than a 0-2/10 pain at the worst.  Pt will have at least 4+/5 to 5/5 strength for B hips.   Pt will  have AROM to WFL for the lumbar spine.   Pt will report a significant improvement with LEFS score (at least 9 points).  Pt will be independent with progressed HEP.

## 2024-02-07 ENCOUNTER — TELEMEDICINE (OUTPATIENT)
Dept: PAIN MEDICINE | Facility: CLINIC | Age: 49
End: 2024-02-07
Payer: MEDICAID

## 2024-02-07 DIAGNOSIS — S32.463G: ICD-10-CM

## 2024-02-07 DIAGNOSIS — G89.4 CHRONIC PAIN SYNDROME: ICD-10-CM

## 2024-02-07 PROCEDURE — 3008F BODY MASS INDEX DOCD: CPT | Performed by: ANESTHESIOLOGY

## 2024-02-07 PROCEDURE — 99442 PR PHYS/QHP TELEPHONE EVALUATION 11-20 MIN: CPT | Performed by: ANESTHESIOLOGY

## 2024-02-07 RX ORDER — OXYCODONE HYDROCHLORIDE 5 MG/1
5 TABLET ORAL 4 TIMES DAILY
Qty: 120 TABLET | Refills: 0 | Status: SHIPPED | OUTPATIENT
Start: 2024-02-07 | End: 2024-03-04 | Stop reason: SDUPTHER

## 2024-02-07 RX ORDER — GABAPENTIN 300 MG/1
600 CAPSULE ORAL 3 TIMES DAILY
Qty: 180 CAPSULE | Refills: 2 | Status: SHIPPED | OUTPATIENT
Start: 2024-02-07 | End: 2024-04-12 | Stop reason: SDUPTHER

## 2024-02-07 NOTE — PROGRESS NOTES
Is a virtual visit conducted through telephone call.  Duration of visit is 11 minutes.  History Of Present Illness  Bony Galvan is a 49 y.o. male presenting status post ORIF of bilateral acetabular fracture with percutaneous fixation of posterior pelvis April 2023 complaining of bilateral pelvic pain.  Patient is maintained on oxycodone 5 mg 4 times daily, meloxicam 15 mg once daily and duloxetine 60 mg once daily.  Patient reports 50% relief with the current regimen.  Today patient continues to complain of his pelvic pain that is affecting his daily activities.  Patient is scheduled to follow-up with his orthopedic surgeon in April.  Patient also is complaining of twitching on the left side of his face.  Patient reports that this has started after his fall in April 2023.    Past Medical History  He has no past medical history on file.    Surgical History  He has a past surgical history that includes IR embolization lymph node (Bilateral, 4/7/2023); IR angiogram inferior epigastric pelvic (4/7/2023); IR angiogram inferior epigastric (4/7/2023); and IR angiogram inferior epigastric pelvic (4/7/2023).     Social History  He reports that he has been smoking cigarettes. He has a 28.00 pack-year smoking history. He has never used smokeless tobacco. He reports that he does not currently use alcohol. He reports that he does not currently use drugs.    Family History  Family History   Family history unknown: Yes        Allergies  Patient has no known allergies.    Review of systems:   13-point review of systems done and negative except as noted in HPI      Physical Exam        Last Recorded Vitals  There were no vitals taken for this visit.    Relevant Results            Last OARRS Review: No data recorded    I have personally reviewed the OARRS report for Bony Galvan I have considered the risks of abuse, dependence, addiction and diversion       Assessment/Plan   Diagnoses and all orders for this visit:  Closed displaced  combined transverse-posterior fracture of acetabulum with delayed healing, unspecified laterality, subsequent encounter  -     oxyCODONE (Roxicodone) 5 mg immediate release tablet; Take 1 tablet (5 mg) by mouth 4 times a day.    I discussed with the patient the importance of seeing a neurologist for evaluation of this facial twitching.  I also discussed continuing his water-based physical therapy and following up with psych.     Plan  Continue current pain medication       Curt Alvarez MD

## 2024-02-07 NOTE — TELEPHONE ENCOUNTER
Pt called for medication refill Gabapentin 600 mg TID, and Oxycodone 5 mg four times daily. Pt has last been seen 8/9/23 had a follow up on 1/12/24 which is a no show. Pt will need a follow up before refilling oxycodone due to not following up at 3 months. I will submit for Gabapentin 600 mg refill to pharmacy. I called LM on patient VM that he will need a follow up before refill the Oxycodone.

## 2024-02-08 ENCOUNTER — APPOINTMENT (OUTPATIENT)
Dept: PHYSICAL THERAPY | Facility: CLINIC | Age: 49
End: 2024-02-08
Payer: MEDICAID

## 2024-02-09 DIAGNOSIS — M47.817 LUMBOSACRAL SPONDYLOSIS WITHOUT MYELOPATHY: ICD-10-CM

## 2024-02-09 DIAGNOSIS — G51.4 FACIAL TWITCHING: ICD-10-CM

## 2024-02-09 RX ORDER — DULOXETIN HYDROCHLORIDE 60 MG/1
60 CAPSULE, DELAYED RELEASE ORAL NIGHTLY
Qty: 30 CAPSULE | Refills: 2 | Status: SHIPPED | OUTPATIENT
Start: 2024-02-09 | End: 2024-06-06 | Stop reason: SDUPTHER

## 2024-02-09 NOTE — TELEPHONE ENCOUNTER
Pt called for medication refill duloxetine 60 mg HS to be sent to pharmacy. Dr. Alvarez would like to refer patient to neurology for evaluation of face twitching.       
None

## 2024-02-13 ENCOUNTER — APPOINTMENT (OUTPATIENT)
Dept: PHYSICAL THERAPY | Facility: CLINIC | Age: 49
End: 2024-02-13
Payer: MEDICAID

## 2024-02-13 DIAGNOSIS — G89.4 CHRONIC PAIN SYNDROME: ICD-10-CM

## 2024-02-13 RX ORDER — MELOXICAM 15 MG/1
15 TABLET ORAL DAILY
Qty: 30 TABLET | Refills: 2 | Status: SHIPPED | OUTPATIENT
Start: 2024-02-13 | End: 2024-04-03 | Stop reason: SINTOL

## 2024-02-22 ENCOUNTER — APPOINTMENT (OUTPATIENT)
Dept: PHYSICAL THERAPY | Facility: CLINIC | Age: 49
End: 2024-02-22
Payer: MEDICAID

## 2024-02-27 ENCOUNTER — APPOINTMENT (OUTPATIENT)
Dept: PHYSICAL THERAPY | Facility: CLINIC | Age: 49
End: 2024-02-27
Payer: MEDICAID

## 2024-02-27 ENCOUNTER — TREATMENT (OUTPATIENT)
Dept: PHYSICAL THERAPY | Facility: CLINIC | Age: 49
End: 2024-02-27
Payer: MEDICAID

## 2024-02-27 DIAGNOSIS — R10.2 PELVIC PAIN IN MALE: ICD-10-CM

## 2024-02-27 DIAGNOSIS — S32.421D CLOSED DISPLACED FRACTURE OF POSTERIOR WALL OF RIGHT ACETABULUM WITH ROUTINE HEALING, SUBSEQUENT ENCOUNTER: ICD-10-CM

## 2024-02-27 PROCEDURE — 97113 AQUATIC THERAPY/EXERCISES: CPT | Mod: GP,CQ

## 2024-02-28 NOTE — PROGRESS NOTES
Patient Name: Bony Galvan  MRN: [id number]  Today's Date: 2/28/2024  Time Calculation  Start Time: 1150  Stop Time: 1215  Time Calculation (min): 25 min    Subjective:  Voiced overall impact that injury has had on functional ability. Hopeful that aquatics will allow him to get stronger .    Objective:  Introduced skilled aquatic therapeutic exercise.    Assessment:   Exercises completed in lap pool due to class being performed in therapy pool. Cues for general form and technique. No change in decrease or increase pain post session.    Plan:   Progress as able.     Treatment : 25 minutes 2 units   Aquatic gait forward/Bwd/Lateral  3x   March 1 minute   Hip 3 way 2 x10  Squats  x10   Hip circles x10 cw   Hamstring /Gastroc stretching 2 x30   HSC x10      Current Problem:  1. Pelvic pain in male  Follow Up In Physical Therapy      2. Closed displaced fracture of posterior wall of right acetabulum with routine healing, subsequent encounter  Follow Up In Physical Therapy              Pain:  7/10    Location: B hips

## 2024-03-04 DIAGNOSIS — S32.463G: ICD-10-CM

## 2024-03-04 RX ORDER — OXYCODONE HYDROCHLORIDE 5 MG/1
5 TABLET ORAL 4 TIMES DAILY
Qty: 120 TABLET | Refills: 0 | Status: SHIPPED | OUTPATIENT
Start: 2024-03-04 | End: 2024-04-03 | Stop reason: SDUPTHER

## 2024-03-14 ENCOUNTER — PATIENT MESSAGE (OUTPATIENT)
Dept: ORTHOPEDIC SURGERY | Facility: HOSPITAL | Age: 49
End: 2024-03-14
Payer: MEDICAID

## 2024-03-18 ENCOUNTER — APPOINTMENT (OUTPATIENT)
Dept: PHYSICAL THERAPY | Facility: CLINIC | Age: 49
End: 2024-03-18
Payer: MEDICAID

## 2024-03-26 ENCOUNTER — TREATMENT (OUTPATIENT)
Dept: PHYSICAL THERAPY | Facility: CLINIC | Age: 49
End: 2024-03-26
Payer: MEDICAID

## 2024-03-26 DIAGNOSIS — R10.2 PELVIC PAIN IN MALE: ICD-10-CM

## 2024-03-26 DIAGNOSIS — S32.421D CLOSED DISPLACED FRACTURE OF POSTERIOR WALL OF RIGHT ACETABULUM WITH ROUTINE HEALING, SUBSEQUENT ENCOUNTER: ICD-10-CM

## 2024-03-26 PROCEDURE — 97113 AQUATIC THERAPY/EXERCISES: CPT | Mod: CQ,GP

## 2024-03-26 ASSESSMENT — PAIN DESCRIPTION - DESCRIPTORS: DESCRIPTORS: STABBING

## 2024-03-26 ASSESSMENT — PAIN - FUNCTIONAL ASSESSMENT: PAIN_FUNCTIONAL_ASSESSMENT: 0-10

## 2024-03-26 ASSESSMENT — PAIN SCALES - GENERAL: PAINLEVEL_OUTOF10: 7

## 2024-03-26 NOTE — PROGRESS NOTES
"Physical Therapy Treatment    Patient Name: Bony Galvan  MRN: [id number]  Today's Date: 3/26/2024  Time Calculation  Start Time: 1730  Stop Time: 1803  Time Calculation (min): 33 min    Insurance  Visit number: 5/10  Limit of 30 Visits     Current Problem  1. Pelvic pain in male  Follow Up In Physical Therapy      2. Closed displaced fracture of posterior wall of right acetabulum with routine healing, subsequent encounter  Follow Up In Physical Therapy          Subjective    General  Pt reports he was sore following initial pool visit.  States \"Man, I could hardly walk out of here.\"  He reports increase in sx's remained into the next day before beginning to subside.  He has some pain today.  No new complaints.     Precautions   No recent falls reported.    Pain  Pain Assessment  Pain Assessment: 0-10  Pain Score: 7  Pain Location: Back  Pain Orientation: Lower, Mid  Pain Radiating Towards:  (Into B LE's)  Pain Descriptors: Stabbing  Pain Frequency: Intermittent    Objective   Pt reports numbness, tingling into B LE's & states sx's are intermittent & \"move around\".    Treatments:  Aquatic Therapy (59019): 33 Minutes, 2 Units    Activities:  Aquatic gait forward/Bwd/Lateral: 3 laps each  March in place: 1 minute   Hip 3 way: x15 each, VANI  Squats: x15   Hip circles: x10 each cw/ccw VANI   Hamstring /Gastroc stretching 2 x30 VANI  HSC x10 VANI  HR/TR: x20     Assessment:   Reviewed activities initiated first visit, providing vc's as needed for technique, core stabilization & posture.  Reps increased as noted & HR/TR were added to activities performed.  Pt struggles some with activities secondary to overall weakness & c/o pain, but able to complete.  Fairly good follow through to cues provided. Overall, no change in sx's reported with activities.  He did comment that he felt better doing activities in warm water pool than he did in lap pool previous visit, but overall pleased with ability to do activities better in the pool. "  Anticipate he will be able to continue progressing with POC as tolerated.     Plan:   Continue to progress with aquatic activities & progress as tolerated to reduce sx's; increase LE/core strength, stability & improve overall functional mobility.

## 2024-03-28 ENCOUNTER — APPOINTMENT (OUTPATIENT)
Dept: PHYSICAL THERAPY | Facility: CLINIC | Age: 49
End: 2024-03-28
Payer: MEDICAID

## 2024-04-01 ENCOUNTER — APPOINTMENT (OUTPATIENT)
Dept: PHYSICAL THERAPY | Facility: CLINIC | Age: 49
End: 2024-04-01
Payer: MEDICAID

## 2024-04-03 DIAGNOSIS — S32.463G: ICD-10-CM

## 2024-04-03 RX ORDER — IBUPROFEN 800 MG/1
800 TABLET ORAL 2 TIMES DAILY
Qty: 60 TABLET | Refills: 2 | Status: SHIPPED | OUTPATIENT
Start: 2024-04-03 | End: 2024-06-06 | Stop reason: SDUPTHER

## 2024-04-03 RX ORDER — OXYCODONE HYDROCHLORIDE 5 MG/1
5 TABLET ORAL 4 TIMES DAILY
Qty: 120 TABLET | Refills: 0 | Status: SHIPPED | OUTPATIENT
Start: 2024-04-03 | End: 2024-05-01 | Stop reason: SDUPTHER

## 2024-04-04 ENCOUNTER — APPOINTMENT (OUTPATIENT)
Dept: PHYSICAL THERAPY | Facility: CLINIC | Age: 49
End: 2024-04-04
Payer: MEDICAID

## 2024-04-09 ENCOUNTER — APPOINTMENT (OUTPATIENT)
Dept: PHYSICAL THERAPY | Facility: CLINIC | Age: 49
End: 2024-04-09
Payer: MEDICAID

## 2024-04-11 ENCOUNTER — APPOINTMENT (OUTPATIENT)
Dept: ORTHOPEDIC SURGERY | Facility: HOSPITAL | Age: 49
End: 2024-04-11
Payer: MEDICAID

## 2024-04-11 ENCOUNTER — APPOINTMENT (OUTPATIENT)
Dept: PHYSICAL THERAPY | Facility: CLINIC | Age: 49
End: 2024-04-11
Payer: MEDICAID

## 2024-04-12 DIAGNOSIS — G89.4 CHRONIC PAIN SYNDROME: ICD-10-CM

## 2024-04-12 DIAGNOSIS — M99.01 CERVICAL SEGMENT DYSFUNCTION: ICD-10-CM

## 2024-04-12 RX ORDER — ACETAMINOPHEN 500 MG
1000 TABLET ORAL 3 TIMES DAILY
Qty: 540 TABLET | Refills: 0 | Status: SHIPPED | OUTPATIENT
Start: 2024-04-12 | End: 2024-07-11

## 2024-04-12 RX ORDER — GABAPENTIN 300 MG/1
600 CAPSULE ORAL 3 TIMES DAILY
Qty: 540 CAPSULE | Refills: 0 | Status: SHIPPED | OUTPATIENT
Start: 2024-04-12 | End: 2024-05-31 | Stop reason: SDUPTHER

## 2024-04-15 DIAGNOSIS — J20.9 ACUTE BRONCHITIS, UNSPECIFIED ORGANISM: ICD-10-CM

## 2024-04-15 RX ORDER — GUAIFENESIN 1200 MG/1
TABLET, EXTENDED RELEASE ORAL
Qty: 20 TABLET | Refills: 0 | OUTPATIENT
Start: 2024-04-15

## 2024-04-18 ENCOUNTER — TREATMENT (OUTPATIENT)
Dept: PHYSICAL THERAPY | Facility: CLINIC | Age: 49
End: 2024-04-18
Payer: MEDICAID

## 2024-04-18 DIAGNOSIS — S32.421D CLOSED DISPLACED FRACTURE OF POSTERIOR WALL OF RIGHT ACETABULUM WITH ROUTINE HEALING, SUBSEQUENT ENCOUNTER: ICD-10-CM

## 2024-04-18 DIAGNOSIS — R10.2 PELVIC PAIN IN MALE: ICD-10-CM

## 2024-04-18 PROCEDURE — 97113 AQUATIC THERAPY/EXERCISES: CPT | Mod: CQ,GP

## 2024-04-18 ASSESSMENT — PAIN SCALES - GENERAL: PAINLEVEL_OUTOF10: 7

## 2024-04-18 ASSESSMENT — PAIN - FUNCTIONAL ASSESSMENT: PAIN_FUNCTIONAL_ASSESSMENT: 0-10

## 2024-04-18 NOTE — PROGRESS NOTES
"Physical Therapy Treatment    Patient Name: Bony Galvan  MRN: [id number]  Today's Date: 4/18/2024  Time Calculation  Start Time: 1738  Stop Time: 1815  Time Calculation (min): 37 min    Insurance  Medicaid  Visit number: 6/10  Limit of 30 Visits  PA>30 visits     Current Problem  1. Pelvic pain in male  Follow Up In Physical Therapy      2. Closed displaced fracture of posterior wall of right acetabulum with routine healing, subsequent encounter  Follow Up In Physical Therapy          Subjective    General  Pt returning to PT today following 3.5 week lapse in care.  States he has not been able to make PT appts secondary to losing his license & not being able to drive.  He has a ride now that will bring him to appts.  Pt states it has \"been rough\" since last here.  States \"Half my day I'm laying down so I don't feel the pressure.\"  States he has stumbled a few times, but has not fallen.  He has been able to catch himself before falling.  He reports having a hard time lifting L leg.  He gets some relief of sx's with medications.      Precautions   Fall risk secondary to overall weakness, instability.  Reports having stumbled several times, but no falls.    Pain  Pain Assessment  Pain Assessment: 0-10  Pain Score: 7  Pain Location: Back ((tailbone, groin area on R side))  Pain Descriptors:  (\"Like I've got a concrete block there.\")  Pain Frequency: Intermittent    Objective   Pt reports \"at times I need to walk with the cane or my walker\".  He arrives to aquatic session today without assistive device.  Able to independently enter/exit pool using ramp & 1 handrail for support.  Guarded posture. Antalgic gait during ambulation.    Treatments:  Aquatic Therapy (74934): 37 Minutes, 2 Units    Activities:  Aquatic gait forward/Bwd/Lateral: 3 laps each  March in place: 1 minute   Hip 3 way: x15 each, VANI  Squats: x15   Hip circles: x10 each cw/ccw VANI   Hamstring /Gastroc stretching 2 x30 VANI  HSC x15 VANI  HR/TR: x20   "   Assessment:   Pt returns to aquatic POC today following 3.5 week lapse in care.  Resumes activities today as tolerated to increase overall strength, stability & endurance to improve functional mobility & capacity for daily activities.  He exhibits fair recall of the activities, but had good follow through to both verbal, visual cues provided.  Reports occasional increase in pain during activities, but able to take short break, continue with activity.  He completes all current activities.  No new activities this visit.      Plan:  Will assess response of return to aquatic PT this visit & plan to continue with POC, progressions as tolerated to increase strength, stability, flexibility & endurance for improved functional mobility and improving capacity to perform daily activities.

## 2024-04-23 ENCOUNTER — APPOINTMENT (OUTPATIENT)
Dept: PHYSICAL THERAPY | Facility: CLINIC | Age: 49
End: 2024-04-23
Payer: MEDICAID

## 2024-04-25 ENCOUNTER — APPOINTMENT (OUTPATIENT)
Dept: PHYSICAL THERAPY | Facility: CLINIC | Age: 49
End: 2024-04-25
Payer: MEDICAID

## 2024-04-25 ENCOUNTER — APPOINTMENT (OUTPATIENT)
Dept: ORTHOPEDIC SURGERY | Facility: HOSPITAL | Age: 49
End: 2024-04-25
Payer: MEDICAID

## 2024-05-01 ENCOUNTER — HOSPITAL ENCOUNTER (OUTPATIENT)
Dept: RADIOLOGY | Facility: HOSPITAL | Age: 49
Discharge: HOME | End: 2024-05-01
Payer: MEDICAID

## 2024-05-01 ENCOUNTER — OFFICE VISIT (OUTPATIENT)
Dept: ORTHOPEDIC SURGERY | Facility: HOSPITAL | Age: 49
End: 2024-05-01
Payer: MEDICAID

## 2024-05-01 DIAGNOSIS — S32.9XXD CLOSED DISPLACED FRACTURE OF PELVIS WITH ROUTINE HEALING, UNSPECIFIED PART OF PELVIS, SUBSEQUENT ENCOUNTER: ICD-10-CM

## 2024-05-01 DIAGNOSIS — S32.463G: ICD-10-CM

## 2024-05-01 DIAGNOSIS — S32.421D CLOSED DISPLACED FRACTURE OF POSTERIOR WALL OF RIGHT ACETABULUM WITH ROUTINE HEALING, SUBSEQUENT ENCOUNTER: ICD-10-CM

## 2024-05-01 PROCEDURE — 99213 OFFICE O/P EST LOW 20 MIN: CPT | Performed by: ORTHOPAEDIC SURGERY

## 2024-05-01 PROCEDURE — 72190 X-RAY EXAM OF PELVIS: CPT | Performed by: STUDENT IN AN ORGANIZED HEALTH CARE EDUCATION/TRAINING PROGRAM

## 2024-05-01 PROCEDURE — 72190 X-RAY EXAM OF PELVIS: CPT

## 2024-05-01 RX ORDER — OXYCODONE HYDROCHLORIDE 5 MG/1
5 TABLET ORAL 4 TIMES DAILY
Qty: 120 TABLET | Refills: 0 | Status: SHIPPED | OUTPATIENT
Start: 2024-05-01 | End: 2024-05-31 | Stop reason: SDUPTHER

## 2024-05-01 NOTE — PROGRESS NOTES
Bony Galvan is post-op from ORIF right both column acetabular fracture and percutaneous fixation of posterior pelvis on 4/7/2023. he is doing well at this point.  Pain is well controlled  Denies fevers or chills.  Denies drainage from the wound.  he reports no additional symptoms or concerns. No shortness of breath or chest pain. No calf swelling or pain.    The patients full medical history, surgical history, medications, allergies, family, medical history, social history, and a complete 14 point review of systems is documented in the medical record on the signed, scanned medical intake sheet or reviewed in the history of present illness. Review of systems otherwise negative    No past medical history on file.    Medication Documentation Review Audit       Reviewed by Félix Naylor MA (Medical Assistant) on 05/01/24 at 1451      Medication Order Taking? Sig Documenting Provider Last Dose Status   acetaminophen (Tylenol) 500 mg tablet [id number] Yes Take 2 tablets (1,000 mg) by mouth 3 times a day. Curt Alvarez MD Taking Active   amoxicillin-pot clavulanate (Augmentin) 875-125 mg tablet [id number]  Please take 1 tablet by mouth please take 1 tablet by mouth with breakfast, and again 1 tablet by mouth with supper.  Keep doses 10 to 12 hours apart.  Lots of fluids throughout the day.  Thank you. Sterling James MD  Active   atomoxetine (Strattera) 10 mg capsule [id number]   Historical Provider, MD  Active   bisacodyl (Dulcolax) 5 mg EC tablet [id number]  Take 2 tablets (10 mg) by mouth once daily at bedtime. Historical Provider, MD  Active   buPROPion (Wellbutrin) 75 mg tablet [id number] Yes Take 1 tablet (75 mg) by mouth once daily. Historical Provider, MD Taking Active   busPIRone (Buspar) 15 mg tablet [id number] Yes Take 1 tablet (15 mg) by mouth 3 times a day. Historical Provider, MD Taking Active   docusate sodium (Colace) 100 mg capsule [id number] Yes Take 1 capsule (100 mg) by mouth 2 times a day.  Historical Provider, MD Taking Active   DULoxetine (Cymbalta) 60 mg DR capsule [id number] Yes Take 1 capsule (60 mg) by mouth once daily at bedtime. Do not crush or chew. Curt Alvarez MD Taking Active   gabapentin (Neurontin) 300 mg capsule [id number] Yes Take 2 capsules (600 mg) by mouth 3 times a day. Curt Alvarez MD Taking Active   ibuprofen 800 mg tablet [id number] Yes Take 1 tablet (800 mg) by mouth 2 times a day. Curt Alvarez MD Taking Active   Mucinex 1,200 mg tablet extended release 12hr [id number] Yes TAKE 1  BY MOUTH ONCE DAILY WITH BREAKFAST AND 1 WITH SUPPER Sterling James MD Taking Active   naloxone (Narcan) 4 mg/0.1 mL nasal spray [id number] Yes CALL 911. SPR CONTENTS OF ONE SPRAYER (0.1ML) INTO ONE NOSTRIL. REPEAT IN 2-3 MIN IF SYMPTOMS OF OPIOID EMERGENCY PERSIST, ALTERNATE NOSTRILS Historical Provider, MD Taking Active     Discontinued 05/01/24 1404   oxyCODONE (Roxicodone) 5 mg immediate release tablet [id number] Yes Take 1 tablet (5 mg) by mouth 4 times a day. Curt Alvarez MD Taking Active   QUEtiapine (SEROquel) 100 mg tablet [id number] Yes TAKE 1 TO 2 TABLETS BY MOUTH AT BEDTIME AS NEEDED Historical Provider, MD Taking Active   sildenafil (Viagra) 100 mg tablet [id number] Yes TAKE 1 TABLET BY MOUTH AS NEEDED FOR ERECTILE DYSFUNCTION. NO MORE THAN 1 TABLET IN 24 HOURS Historical Provider, MD Taking Active                    No Known Allergies    Social History     Socioeconomic History    Marital status:      Spouse name: Suzi    Number of children: 6    Years of education: Not on file    Highest education level: Not on file   Occupational History    Not on file   Tobacco Use    Smoking status: Every Day     Current packs/day: 1.00     Average packs/day: 1 pack/day for 28.0 years (28.0 ttl pk-yrs)     Types: Cigarettes    Smokeless tobacco: Never   Vaping Use    Vaping status: Not on file   Substance and Sexual Activity    Alcohol use: Not Currently    Drug use: Not  Currently    Sexual activity: Not on file   Other Topics Concern    Not on file   Social History Narrative    Not on file     Social Determinants of Health     Financial Resource Strain: Not on file   Food Insecurity: Not on file   Transportation Needs: Not on file   Physical Activity: Not on file   Stress: Not on file   Social Connections: Not on file   Intimate Partner Violence: Not on file   Housing Stability: Not on file       Past Surgical History:   Procedure Laterality Date    IR ANGIOGRAM INFERIOR EPIGASTRIC  4/7/2023    IR ANGIOGRAM INFERIOR EPIGASTRIC [facility] ANGIO    IR ANGIOGRAM INFERIOR EPIGASTRIC PELVIC  4/7/2023    IR ANGIOGRAM INFERIOR EPIGASTRIC PELVIC [facility] ANGIO    IR ANGIOGRAM INFERIOR EPIGASTRIC PELVIC  4/7/2023    IR ANGIOGRAM INFERIOR EPIGASTRIC PELVIC [facility] ANGIO    IR EMBOLIZATION LYMPH NODE Bilateral 4/7/2023    IR EMBOLIZATION LYMPH NODE [facility] ANGIO       Gen: The patient is alert and oriented ×3, is in no acute distress, and appear their stated age and weight.    Psychiatric: Mood and affect are appropriate.    Eyes: Sclera are white, and pupils are round and symmetric.    ENT: Mucous membranes are moist.     Neck: Supple. Thyroid is midline.    Respiratory: Respirations are nonlabored, chest rise is symmetric.    Cardiac: Rate is regular by palpation of distal pulses.     Abdomen: Nondistended.    Integument: No obvious cutaneous lesions are noted. No signs of lymphangitis. No signs of systemic edema.  side: right lower extremity :  his  surgical incisions are healing well, without evidence of erythema, fluctuance, drainage, or infection.  The skin around the incision is intact.  Distally neurovascular exam is stable.  There is appropriate tenderness to palpation in the soto-incisional area. No calf swelling or tenderness to palpation.      I personally reviewed multiple views of pelvis were obtained in the office today demonstrate maintenance of reduction, interval healing, and a stable position  of the hardware.      Bony Galvan is a 49 y.o. male patient status post ORIF right both, acetabular fracture with percutaneous fixation of posterior pelvis on 4/7/2023   I went over his x-rays in detail today.   he is WBAT of the side: right lower extremity. ~He/she~ is range of motion as tolerated of the side: right lower extremity.  He will likely have some arthritic symptoms down in the future as he had a bad injury.  He may or may not need a total hip depending on how his arthritis does in the future but this could be 5 to 20 years down the line.  I stressed the importance of physical therapy on overall functional outcome. I answered all patient's questions he agrees with treatment plan.  I will see him back in Follow-up as necessary with repeat 5 views of the pelvis including AP inlet outlet and judets.  Constantin Griffiths    Department of Orthopaedic Trauma Surgery

## 2024-05-02 ENCOUNTER — APPOINTMENT (OUTPATIENT)
Dept: PHYSICAL THERAPY | Facility: CLINIC | Age: 49
End: 2024-05-02
Payer: MEDICAID

## 2024-05-09 ENCOUNTER — TREATMENT (OUTPATIENT)
Dept: PHYSICAL THERAPY | Facility: CLINIC | Age: 49
End: 2024-05-09
Payer: MEDICAID

## 2024-05-09 DIAGNOSIS — R10.2 PELVIC PAIN IN MALE: ICD-10-CM

## 2024-05-09 DIAGNOSIS — S32.421D CLOSED DISPLACED FRACTURE OF POSTERIOR WALL OF RIGHT ACETABULUM WITH ROUTINE HEALING, SUBSEQUENT ENCOUNTER: ICD-10-CM

## 2024-05-09 PROCEDURE — 97113 AQUATIC THERAPY/EXERCISES: CPT | Mod: CQ,GP

## 2024-05-09 ASSESSMENT — PAIN DESCRIPTION - DESCRIPTORS: DESCRIPTORS: PRESSURE;SQUEEZING

## 2024-05-09 ASSESSMENT — PAIN SCALES - GENERAL: PAINLEVEL_OUTOF10: 7

## 2024-05-09 ASSESSMENT — PAIN - FUNCTIONAL ASSESSMENT: PAIN_FUNCTIONAL_ASSESSMENT: 0-10

## 2024-05-09 NOTE — PROGRESS NOTES
"Physical Therapy Treatment    Patient Name: Bony Galvan  MRN: [id number]  Today's Date: 5/9/2024  Time Calculation  Start Time: 1643  Stop Time: 1706  Time Calculation (min): 23 min    Insurance  Medicaid  Visit number: 7/10  Limit of 30 Visits  PA>30 visits      Current Problem  1. Pelvic pain in male  Follow Up In Physical Therapy      2. Closed displaced fracture of posterior wall of right acetabulum with routine healing, subsequent encounter  Follow Up In Physical Therapy          Subjective    General  Pt reports he is not seeing any improvement in overall sx's.  States he has been referred to a neurologist, but has not been yet.  Otherwise, he has no new complaints & states \"I survived another day.\"   Reports fair compliance with HEP & states \"I try, but I got discomfort every day.\"  He reports inconsistency in visits has been due to having transportation issues.  He reports following previous visit, his ride did not show up & he ended up walking about 3 miles before ride met up with him.     Precautions  Precautions  Precautions Comment: Pt reports he consistently stumbles, but also states he has not had any falls.    Pain  Pain Assessment  Pain Assessment: 0-10  Pain Score: 7  Pain Location: Back (R Side)  Pain Orientation: Right  Pain Descriptors: Pressure, Squeezing (\"Like I'm being attacked by a bunch of bees\")  Pain Frequency: Intermittent  Clinical Progression: Not changed    Objective   Pt ambulating today without assistive device & states \"If I fall, I fall.\"    Treatments:  Aquatic Therapy (16707): 23 Minutes, 2 Units    Activities:  Aquatic gait forward/Bwd/Lateral: 2 laps each  March Across: 2 laps  Hip 3 way: x10 each, AVNI  Squats: x15   Hip circles: x10 each cw/ccw VANI   Hamstring /Gastroc stretching 2 x30 VANI  HSC x15 VANI  HR/TR: x15       Assessment:   Pt returns again following 3 week lapse in care secondary to having transportation issues.  He reports he has seen minimal progress since " beginning rehab POC.  However, visits have also been inconsistent.  Also reports inconsistency with independent HEP.  He continues with current activities today & completes with good tolerance.  Reps reduced to get in all of the activities this visit secondary to late arrival & shortened session.  Anticipate he will be able to continue progressing with POC next visit.    Plan:   Continue with POC, progressions as tolerated to increase strength, stability, flexibility & endurance for improved functional mobility and improved capacity to perform daily activities.    Assess independent HEP next visit.     OP EDUCATION:   Encouraged more consistency with rehab POC & independent HEP to have best outcome from POC

## 2024-05-13 ENCOUNTER — TREATMENT (OUTPATIENT)
Dept: PHYSICAL THERAPY | Facility: CLINIC | Age: 49
End: 2024-05-13
Payer: MEDICAID

## 2024-05-13 DIAGNOSIS — R10.2 PELVIC PAIN IN MALE: ICD-10-CM

## 2024-05-13 DIAGNOSIS — S32.421D CLOSED DISPLACED FRACTURE OF POSTERIOR WALL OF RIGHT ACETABULUM WITH ROUTINE HEALING, SUBSEQUENT ENCOUNTER: ICD-10-CM

## 2024-05-13 PROCEDURE — 97110 THERAPEUTIC EXERCISES: CPT | Mod: GP | Performed by: PHYSICAL THERAPIST

## 2024-05-13 PROCEDURE — 97140 MANUAL THERAPY 1/> REGIONS: CPT | Mod: GP | Performed by: PHYSICAL THERAPIST

## 2024-05-13 NOTE — PROGRESS NOTES
Physical Therapy Treatment    Patient Name: Bony Galvan  MRN: [id number]  Today's Date: 5/15/2024  Treatment Date: 5/13/2024  Time Calculation  Start Time: 0420  Stop Time: 0459  Time Calculation (min): 39 min       PT Therapeutic Procedures Time Entry  Manual Therapy Time Entry: 12  Therapeutic Exercise Time Entry: 26                   INSURANCE:  Visit number: 8/10  Limit of 30 Visits     Surgery: ORIF of right both, acetabular fracture with percutaneous fixation of posterior pelvis on 4/7/2023  Referring Provider: Dr. Constantin Griffiths    CURRENT PROBLEM:  1. Pelvic pain in male  Follow Up In Physical Therapy      2. Closed displaced fracture of posterior wall of right acetabulum with routine healing, subsequent encounter  Follow Up In Physical Therapy        SUBJECTIVE:   General -   Pt is not doing home exercises.  Pain management.  Good days and bad days.  Pt feels like he is getting depressed, does have a place that he goes to for mental health.  Lost drivers licence, so hard to get to appointments.  Orthopedic surgeon discharged him for now.  Aquatic therapy is helping. Warm water helps, especially taking taking hot showers.    Pain -    Pain Location: Pelvis and hips  Pain Best: 4/10  Pain Today: 6/10  Pain Worst: 7/10     Precautions -    Fall risk    OBJECTIVE:   Hip AROM (degrees) - Reduced L hip flexion, and B ext/IR. (R>L)     Hip MMT (/5) -  R hip Flexion: 4  R hip ER: 4-  R hip IR: 4-  R hip Adduction: 4-  R hip Abduction: 4  L hip Flexion: 4  L hip ER: 4-  L hip IR: 4-  L hip Adduction: 4-  L hip Abduction: 4     Hip Knee (/5) -   R knee Extension: 4+  R knee Flexion: 4+  L knee Extension: 4  L knee Flexion: 4+     Lumbar AROM by Percent -  Lumbar Flexion: 60% with pain  Lumbar Extension: 20% with pain  Lumbar Side bending R: 70% with pain  Lumbar Side bending L: 70% with pain     Posture -   Lower Crossed Syndrome      Functional Assessment -   SLS: Trendelenburg L positive   Breathing: slightly reduced  excursion    Hip/Lumbar Special Tests -  VIRGIL positive bilateral  Oj positive bilateral     Flexibility -   Decreased Tissue Length of: Iliopsoas, Adductors, HS, and piriformis     Palpation - Consent to External Palpation Verbally Given  Iliopsoas TTP bilateral     Gait -  Noted: antalgic, Trendelenburg, and trunk lean.     Outcome Measures -   Lower Extremity Functional Score (LEFS): 25 (/80 on 5/13/24)    Treatment -   Therapeutic Exercise (99199) -  Assessment  Reviewed Log Roll  Hip Flexor stretch  Manual Therapy (54989) -    STM and myofascial pulling to R flank/pelvic incision  Rock Tape to lateral scar    OP Patient Education -   Hip Flexor Stretch  Log Roll    ASSESSMENT:  PT Assessment  Rehab Prognosis: Fair  Evaluation/Treatment Tolerance: Patient limited by pain    Pt is progressing towards his goals and demonstrates improvements with both ROM and strength at this time. Pt reports an increased ability to perform ADLs, but still exhibits deficits. Assessed R lateral scar and performed manual and taping for reduced pain. Pt will benefit from continued skilled PT in order to further improve ROM and strength of the pelvis/B hips and abdominals so that the pt can perform ADLs without pain and return to PLOF. Pt tolerated session well and is progressing towards functional needs. Continue to progress pt within tolerance and POC.    PLAN:  Treatments/Interventions: aquatic, traction, gait training, edema control, education/instruction, home program, self care/home management, manual therapy, neuromuscular re-education, therapeutic activities, therapeutic exercises, modalities, therapeutic elastic taping.   OP PT Plan  PT Plan: Skilled PT  PT Frequency: 1 time per week  Duration: 4 visits  Rehab Potential: Fair  Plan of Care Agreement: Patient    Goals -   By discharge pt will achieve the following goals:   STG - After about 6 weeks:  Pt will report less than a 4/10 pain at the worst. (Goal PROGRESSING)     Pt will be able to manage changes in intra-abdominal pressure with appropriate pelvic and TA muscle activation. (Goal PROGRESSING)    Pt will be able to coordinate pelvic/TA muscles with thoracic diaphragm during functional activities that cause symptoms. (Goal PROGRESSING)    Pt will increase by 1 MMT grade for B hips. (Goal MET)   Pt will improve LEFS score by 7 points. (Not Assessed)     LTG - after about 12 weeks:  Pt will report less than a 0-2/10 pain at the worst.  Pt will have at least 4+/5 to 5/5 strength for B hips. (Goal PROGRESSING)    Pt will have AROM to WFL for the lumbar spine. .progb   Pt will report a significant improvement with LEFS score (at least 9 points).  Pt will be independent with progressed HEP.

## 2024-05-17 ENCOUNTER — APPOINTMENT (OUTPATIENT)
Dept: NEUROLOGY | Facility: CLINIC | Age: 49
End: 2024-05-17
Payer: MEDICAID

## 2024-05-30 ENCOUNTER — OFFICE VISIT (OUTPATIENT)
Dept: UROLOGY | Facility: HOSPITAL | Age: 49
End: 2024-05-30
Payer: MEDICAID

## 2024-05-30 DIAGNOSIS — S37.30XD INJURY OF URETHRA, SUBSEQUENT ENCOUNTER: ICD-10-CM

## 2024-05-30 DIAGNOSIS — N52.9 ERECTILE DYSFUNCTION, UNSPECIFIED ERECTILE DYSFUNCTION TYPE: Primary | ICD-10-CM

## 2024-05-30 PROBLEM — S37.30XA URETHRAL INJURY: Status: ACTIVE | Noted: 2024-05-30

## 2024-05-30 PROCEDURE — 99214 OFFICE O/P EST MOD 30 MIN: CPT | Performed by: UROLOGY

## 2024-05-30 RX ORDER — TADALAFIL 20 MG/1
20 TABLET ORAL DAILY PRN
Qty: 30 TABLET | Refills: 5 | Status: SHIPPED | OUTPATIENT
Start: 2024-05-30 | End: 2025-05-30

## 2024-05-30 NOTE — PROGRESS NOTES
"HPI    49 y.o. male being seen with the following problem list:    Problem list:  1.history of pelvic fracture  2. Gross hematuria at setting of his pelvic fracture, neg RUG and CT cystogram     transferred to [facility] from OSH 4/6/23 after a 20ft fall from a tree, +LOC. Workup demonstrated R acetabular and iliac wing fractures, bilateral inferior and superior pubic rami fractures and a pelvic sidewall hematoma. He was taken to IR for bilateral internal iliac embolization and aberrant L obturator embolization. From a urologic perspective, he had RUG and CT cystogram that were both neg for urologic injury, and a gayle was placed.      Was taken to OR for orthopaedic intervention 4/7, gayle removed and subsequently found to be in retention with gayle replaced 4/9. Subsequently passed TOV 4/14/23. Discharged to acute rehab 4/15/23     5/11/23 - comes in today for evaluation. voiding fine, had some GH in rehab but now cleared. PVR 0ml. slowly recovering. Recommended cysto    05/30/24 -bladder scan, 147cc voided 2 or 3 hours ago.  No urinary complaints.  Stream is strong.  No straining.  No splitting of the stream.  No hematuria.  No infections.  Notes ED, is on long-term opiates as well as gabapentin.    No results found for: \"PSA\"      Current Medications:  Current Outpatient Medications   Medication Sig Dispense Refill    acetaminophen (Tylenol) 500 mg tablet Take 2 tablets (1,000 mg) by mouth 3 times a day. 540 tablet 0    amoxicillin-pot clavulanate (Augmentin) 875-125 mg tablet Please take 1 tablet by mouth please take 1 tablet by mouth with breakfast, and again 1 tablet by mouth with supper.  Keep doses 10 to 12 hours apart.  Lots of fluids throughout the day.  Thank you. 20 tablet 0    atomoxetine (Strattera) 10 mg capsule       bisacodyl (Dulcolax) 5 mg EC tablet Take 2 tablets (10 mg) by mouth once daily at bedtime.      buPROPion (Wellbutrin) 75 mg tablet Take 1 tablet (75 mg) by mouth once daily.      busPIRone " (Buspar) 15 mg tablet Take 1 tablet (15 mg) by mouth 3 times a day.      docusate sodium (Colace) 100 mg capsule Take 1 capsule (100 mg) by mouth 2 times a day.      DULoxetine (Cymbalta) 60 mg DR capsule Take 1 capsule (60 mg) by mouth once daily at bedtime. Do not crush or chew. 30 capsule 2    gabapentin (Neurontin) 300 mg capsule Take 2 capsules (600 mg) by mouth 3 times a day. 540 capsule 0    ibuprofen 800 mg tablet Take 1 tablet (800 mg) by mouth 2 times a day. 60 tablet 2    Mucinex 1,200 mg tablet extended release 12hr TAKE 1  BY MOUTH ONCE DAILY WITH BREAKFAST AND 1 WITH SUPPER 20 tablet 0    naloxone (Narcan) 4 mg/0.1 mL nasal spray CALL 911. SPR CONTENTS OF ONE SPRAYER (0.1ML) INTO ONE NOSTRIL. REPEAT IN 2-3 MIN IF SYMPTOMS OF OPIOID EMERGENCY PERSIST, ALTERNATE NOSTRILS      oxyCODONE (Roxicodone) 5 mg immediate release tablet Take 1 tablet (5 mg) by mouth 4 times a day. 120 tablet 0    QUEtiapine (SEROquel) 100 mg tablet TAKE 1 TO 2 TABLETS BY MOUTH AT BEDTIME AS NEEDED      sildenafil (Viagra) 100 mg tablet TAKE 1 TABLET BY MOUTH AS NEEDED FOR ERECTILE DYSFUNCTION. NO MORE THAN 1 TABLET IN 24 HOURS       No current facility-administered medications for this visit.        Active Problems:  Bony Galvan is a 49 y.o. male with the following Problems and Medications.  Patient Active Problem List   Diagnosis    Drug abuse in remission (Multi)    Erectile dysfunction    Debility    Closed displaced fracture of pelvis (Multi)    Chronic pain syndrome    Blood pressure elevated without history of HTN    Hyperglycemia    Attention deficit disorder    Chronic insomnia    Irritability and anger    Transaminasemia    Anemia, normocytic normochromic    Vitamin D deficiency    Astigmatism of both eyes    Cervical segment dysfunction    Chronic rhinitis    Hypertrophy of inferior nasal turbinate    Lumbosacral spondylosis without myelopathy    Nasal septal deviation    Presbyopia    Closed chip fracture of triquetrum  of right wrist    Pelvic pain in male    PTSD (post-traumatic stress disorder)    Bipolar disorder, in partial remission, most recent episode hypomanic (Multi)    Drug-induced constipation     Current Outpatient Medications   Medication Sig Dispense Refill    acetaminophen (Tylenol) 500 mg tablet Take 2 tablets (1,000 mg) by mouth 3 times a day. 540 tablet 0    amoxicillin-pot clavulanate (Augmentin) 875-125 mg tablet Please take 1 tablet by mouth please take 1 tablet by mouth with breakfast, and again 1 tablet by mouth with supper.  Keep doses 10 to 12 hours apart.  Lots of fluids throughout the day.  Thank you. 20 tablet 0    atomoxetine (Strattera) 10 mg capsule       bisacodyl (Dulcolax) 5 mg EC tablet Take 2 tablets (10 mg) by mouth once daily at bedtime.      buPROPion (Wellbutrin) 75 mg tablet Take 1 tablet (75 mg) by mouth once daily.      busPIRone (Buspar) 15 mg tablet Take 1 tablet (15 mg) by mouth 3 times a day.      docusate sodium (Colace) 100 mg capsule Take 1 capsule (100 mg) by mouth 2 times a day.      DULoxetine (Cymbalta) 60 mg DR capsule Take 1 capsule (60 mg) by mouth once daily at bedtime. Do not crush or chew. 30 capsule 2    gabapentin (Neurontin) 300 mg capsule Take 2 capsules (600 mg) by mouth 3 times a day. 540 capsule 0    ibuprofen 800 mg tablet Take 1 tablet (800 mg) by mouth 2 times a day. 60 tablet 2    Mucinex 1,200 mg tablet extended release 12hr TAKE 1  BY MOUTH ONCE DAILY WITH BREAKFAST AND 1 WITH SUPPER 20 tablet 0    naloxone (Narcan) 4 mg/0.1 mL nasal spray CALL 911. SPR CONTENTS OF ONE SPRAYER (0.1ML) INTO ONE NOSTRIL. REPEAT IN 2-3 MIN IF SYMPTOMS OF OPIOID EMERGENCY PERSIST, ALTERNATE NOSTRILS      oxyCODONE (Roxicodone) 5 mg immediate release tablet Take 1 tablet (5 mg) by mouth 4 times a day. 120 tablet 0    QUEtiapine (SEROquel) 100 mg tablet TAKE 1 TO 2 TABLETS BY MOUTH AT BEDTIME AS NEEDED      sildenafil (Viagra) 100 mg tablet TAKE 1 TABLET BY MOUTH AS NEEDED FOR  ERECTILE DYSFUNCTION. NO MORE THAN 1 TABLET IN 24 HOURS       No current facility-administered medications for this visit.       PMH:  No past medical history on file.    PSH:  Past Surgical History:   Procedure Laterality Date    IR ANGIOGRAM INFERIOR EPIGASTRIC  4/7/2023    IR ANGIOGRAM INFERIOR EPIGASTRIC [facility] ANGIO    IR ANGIOGRAM INFERIOR EPIGASTRIC PELVIC  4/7/2023    IR ANGIOGRAM INFERIOR EPIGASTRIC PELVIC [facility] ANGIO    IR ANGIOGRAM INFERIOR EPIGASTRIC PELVIC  4/7/2023    IR ANGIOGRAM INFERIOR EPIGASTRIC PELVIC [facility] ANGIO    IR EMBOLIZATION LYMPH NODE Bilateral 4/7/2023    IR EMBOLIZATION LYMPH NODE [facility] ANGIO       FMH:  Family History   Family history unknown: Yes       SHx:  Social History     Tobacco Use    Smoking status: Every Day     Current packs/day: 1.00     Average packs/day: 1 pack/day for 28.0 years (28.0 ttl pk-yrs)     Types: Cigarettes    Smokeless tobacco: Never   Substance Use Topics    Alcohol use: Not Currently    Drug use: Not Currently       Allergies:  No Known Allergies      Assessment/Plan  He never had cystoscopy, but given the time since his accident and his lack of any worrisome symptoms and likely adequate emptying given how long ago he last voided, I do not think a cystoscopy this time is warranted.  If he has any problems he will let me know.  I reviewed return precautions.    We discussed options for erectile dysfunction.  We discussed various medications.  Will start on Cialis 20 mg daily as needed.  Discussed side effects and contraindication to nitrates.    Will follow-up in 3 months or telehealth for symptom check        Scribe Attestation  By signing my name below, I, Ciro Abreu, attest that this documentation  has been prepared under the direction and in the presence of Juan Rodriguez MD.

## 2024-05-31 ENCOUNTER — TELEMEDICINE (OUTPATIENT)
Dept: PAIN MEDICINE | Facility: CLINIC | Age: 49
End: 2024-05-31
Payer: MEDICAID

## 2024-05-31 ENCOUNTER — APPOINTMENT (OUTPATIENT)
Dept: UROLOGY | Facility: HOSPITAL | Age: 49
End: 2024-05-31
Payer: MEDICAID

## 2024-05-31 DIAGNOSIS — S32.463G: Primary | ICD-10-CM

## 2024-05-31 DIAGNOSIS — G89.4 CHRONIC PAIN SYNDROME: ICD-10-CM

## 2024-05-31 DIAGNOSIS — S32.463G: ICD-10-CM

## 2024-05-31 PROCEDURE — 99442 PR PHYS/QHP TELEPHONE EVALUATION 11-20 MIN: CPT | Performed by: ANESTHESIOLOGY

## 2024-05-31 RX ORDER — GABAPENTIN 300 MG/1
600 CAPSULE ORAL 4 TIMES DAILY
Qty: 240 CAPSULE | Refills: 0 | Status: SHIPPED | OUTPATIENT
Start: 2024-05-31 | End: 2024-06-06 | Stop reason: SDUPTHER

## 2024-05-31 RX ORDER — OXYCODONE HYDROCHLORIDE 5 MG/1
5 TABLET ORAL 3 TIMES DAILY
Qty: 90 TABLET | Refills: 0 | Status: SHIPPED | OUTPATIENT
Start: 2024-05-31 | End: 2024-06-30

## 2024-05-31 NOTE — PROGRESS NOTES
This virtual visit conducted through telephone call.  Duration of visit is 11 minutes.  History Of Present Illness  Bony Galvan is a 49 y.o. male status post ORIF of bilateral acetabular fracture with percutaneous fixation of posterior pelvis April 2023 complaining of bilateral pelvic pain.  Patient was recently evaluated by orthopedics and they feel that he is progressing fairly well.  Patient continues to be involved in physical therapy.    Patient is maintained on gabapentin 600 mg 3 times daily, oxycodone 5 mg 4 times daily, ibuprofen 800 mg once daily and duloxetine 60 mg once daily.  Patient reports 50% relief with the current regimen.       Today I discussed with the patient importance of weaning off oxycodone.  I recommended going to 3 tablets of oxycodone per day.  I also recommended increasing his gabapentin to 600 mg 4 times daily.  Patient was agreeable with plan and willing to try that.  Past Medical History  He has no past medical history on file.    Surgical History  He has a past surgical history that includes IR embolization lymph node (Bilateral, 4/7/2023); IR angiogram inferior epigastric pelvic (4/7/2023); IR angiogram inferior epigastric (4/7/2023); and IR angiogram inferior epigastric pelvic (4/7/2023).     Social History  He reports that he has been smoking cigarettes. He has a 28 pack-year smoking history. He has never used smokeless tobacco. He reports that he does not currently use alcohol. He reports that he does not currently use drugs.    Family History  Family History   Family history unknown: Yes        Allergies  Patient has no known allergies.    Review of systems:   13-point review of systems done and negative except as noted in HPI      Physical Exam   Vital signs: Reviewed  Constitutional: No acute distress, well appearing and well nourished. Patient appears stated age.   Eyes: Conjunctiva non-icteric and eye lids are without obvious rash or drooping. Pupils are symmetric.   Ears,  Nose, Mouth, and Throat: External ears and nose appear to be without deformity or rash. No lesions or masses noted. Hearing is grossly intact.   Neck:. No JVD noted, tracheal position is midline.   Head and Face: Examination of the head and face revealed no abnormalities.   Respiratory: No gasping or shortness of breath noted, no use of accessory muscles noted.   Cardiovascular: Examination for edema is normal.   GI: Abdomen nontender to palpation.   Skin: No rashes or open lesions/ulcers identified on skin.   Musk: Digits/nails show no clubbing or cyanosis. No asymmetry or masses noted of the musculature. Examination of the muscles/joints/bones show normal range of motion. Gait is grossly [].  [] to walk on toes and heels.   Neurologic: Cranial nerves II-XII intact, motor strength 5/5 muscle strength of the lower extremities bilaterally and equal. 5/5 muscle strength of the upper extremities bilaterally and equal.   Reflexes: normal.   Sensation: []  Provocative tests:       Last Recorded Vitals  There were no vitals taken for this visit.    Relevant Results            Last OARRS Review: No data recorded    I have personally reviewed the OARRS report for Bony Galvan I have considered the risks of abuse, dependence, addiction and diversion  Overdose risk score is 420, morphine milliequivalents is 31 mg/day     Assessment/Plan   Diagnoses and all orders for this visit:  Closed displaced combined transverse-posterior fracture of acetabulum with delayed healing, unspecified laterality, subsequent encounter      Plan  Gabapentin 600 mg 4 times daily  Oxycodone 5 mg 3 times daily       Curt Alvarez MD

## 2024-06-04 ENCOUNTER — TREATMENT (OUTPATIENT)
Dept: PHYSICAL THERAPY | Facility: CLINIC | Age: 49
End: 2024-06-04
Payer: MEDICAID

## 2024-06-04 DIAGNOSIS — S32.421D CLOSED DISPLACED FRACTURE OF POSTERIOR WALL OF RIGHT ACETABULUM WITH ROUTINE HEALING, SUBSEQUENT ENCOUNTER: ICD-10-CM

## 2024-06-04 DIAGNOSIS — R10.2 PELVIC PAIN IN MALE: ICD-10-CM

## 2024-06-04 PROCEDURE — 97110 THERAPEUTIC EXERCISES: CPT | Mod: GP | Performed by: PHYSICAL THERAPIST

## 2024-06-04 PROCEDURE — 97140 MANUAL THERAPY 1/> REGIONS: CPT | Mod: GP | Performed by: PHYSICAL THERAPIST

## 2024-06-04 NOTE — PROGRESS NOTES
Physical Therapy Treatment    Patient Name: Bony Galvan  MRN: [id number]  Today's Date: 6/4/2024  Time Calculation  Start Time: 0323  Stop Time: 0405  Time Calculation (min): 42 min       PT Therapeutic Procedures Time Entry  Manual Therapy Time Entry: 23  Therapeutic Exercise Time Entry: 15                   INSURANCE:  Visit number: 9/14  Limit of 30 Visits     Surgery: ORIF of right both, acetabular fracture with percutaneous fixation of posterior pelvis on 4/7/2023  Referring Provider: Dr. Constantin Griffiths    CURRENT PROBLEM:  1. Pelvic pain in male  Follow Up In Physical Therapy      2. Closed displaced fracture of posterior wall of right acetabulum with routine healing, subsequent encounter  Follow Up In Physical Therapy        SUBJECTIVE:   General -   Pt is doing home exercises.  Taping really helped.  Tried using some athletic tape, but had a bad reaction to it.     Pain -    Pain Location: R side pelvis   6/10    Precautions -    None    OBJECTIVE:     Treatment -   Therapeutic Exercise (51492) -  Bridges: x20  Hook-lying B hip AB: GTB x20  Educated on how to apply Kinesiology Tape  Manual Therapy (53868) -    STM to scar  Cupping and gliding to R abdominals and incision scar  Rock Tape Applied to R incision scar     OP Patient Education -   Rock Tape Instructions    ASSESSMENT:     STM and cupping/gliding performed to R pelvis/abdominals and incision scar for reduced adhesions. Pt could possibly benefit from a lift chair for pain due to his pelvic fracture. Pt was educated on how to apply kinesiology tape for reduced pain. Pt tolerated session well and is progressing towards functional needs. Continue to progress pt within tolerance and POC.    PLAN:    Continue to progress pt within tolerance. Continue with manual techniques for improved mobility and reduced pain.

## 2024-06-06 DIAGNOSIS — G89.4 CHRONIC PAIN SYNDROME: ICD-10-CM

## 2024-06-06 DIAGNOSIS — M47.817 LUMBOSACRAL SPONDYLOSIS WITHOUT MYELOPATHY: ICD-10-CM

## 2024-06-06 DIAGNOSIS — S32.463G: ICD-10-CM

## 2024-06-06 RX ORDER — GABAPENTIN 300 MG/1
600 CAPSULE ORAL 4 TIMES DAILY
Qty: 240 CAPSULE | Refills: 2 | Status: SHIPPED | OUTPATIENT
Start: 2024-06-06 | End: 2024-09-04

## 2024-06-06 RX ORDER — DULOXETIN HYDROCHLORIDE 60 MG/1
60 CAPSULE, DELAYED RELEASE ORAL NIGHTLY
Qty: 30 CAPSULE | Refills: 2 | Status: SHIPPED | OUTPATIENT
Start: 2024-06-06 | End: 2024-09-04

## 2024-06-06 RX ORDER — IBUPROFEN 800 MG/1
800 TABLET ORAL 2 TIMES DAILY
Qty: 60 TABLET | Refills: 2 | Status: SHIPPED | OUTPATIENT
Start: 2024-06-06 | End: 2024-09-04

## 2024-06-06 NOTE — TELEPHONE ENCOUNTER
Pt called for medication refill Duloxetine 60 mg at night, gabapentin 300 mg two tablet 4 times daily and  mg BID to be sent to pharmacy

## 2024-06-18 ENCOUNTER — APPOINTMENT (OUTPATIENT)
Dept: NEUROLOGY | Facility: CLINIC | Age: 49
End: 2024-06-18
Payer: MEDICAID

## 2024-06-19 ENCOUNTER — APPOINTMENT (OUTPATIENT)
Dept: PHYSICAL THERAPY | Facility: CLINIC | Age: 49
End: 2024-06-19
Payer: MEDICAID

## 2024-06-25 ENCOUNTER — APPOINTMENT (OUTPATIENT)
Dept: NEUROLOGY | Facility: CLINIC | Age: 49
End: 2024-06-25
Payer: MEDICAID

## 2024-06-25 ENCOUNTER — APPOINTMENT (OUTPATIENT)
Dept: PHYSICAL THERAPY | Facility: CLINIC | Age: 49
End: 2024-06-25
Payer: MEDICAID

## 2024-06-25 DIAGNOSIS — Z79.891 ENCOUNTER FOR LONG-TERM USE OF OPIATE ANALGESIC: ICD-10-CM

## 2024-06-25 DIAGNOSIS — S32.463G: ICD-10-CM

## 2024-06-25 RX ORDER — OXYCODONE HYDROCHLORIDE 5 MG/1
5 TABLET ORAL 3 TIMES DAILY
Qty: 90 TABLET | Refills: 0 | Status: SHIPPED | OUTPATIENT
Start: 2024-06-25 | End: 2024-07-25

## 2024-06-25 NOTE — TELEPHONE ENCOUNTER
Pt called for medication refill Oxycodone 5 mg one tablet 3 times daily  to be filled to preferred pharmacy. Update UDS/CSA needs to be done. Patient was notified and is scheduled for 7/31/24 and will do UDS in the next 2 days.

## 2024-07-09 ENCOUNTER — TREATMENT (OUTPATIENT)
Dept: PHYSICAL THERAPY | Facility: CLINIC | Age: 49
End: 2024-07-09
Payer: MEDICAID

## 2024-07-09 DIAGNOSIS — S32.421D CLOSED DISPLACED FRACTURE OF POSTERIOR WALL OF RIGHT ACETABULUM WITH ROUTINE HEALING, SUBSEQUENT ENCOUNTER: ICD-10-CM

## 2024-07-09 DIAGNOSIS — R10.2 PELVIC PAIN IN MALE: ICD-10-CM

## 2024-07-09 PROCEDURE — 97110 THERAPEUTIC EXERCISES: CPT | Mod: GP | Performed by: PHYSICAL THERAPIST

## 2024-07-09 PROCEDURE — 97140 MANUAL THERAPY 1/> REGIONS: CPT | Mod: GP | Performed by: PHYSICAL THERAPIST

## 2024-07-09 NOTE — PROGRESS NOTES
Physical Therapy Treatment    Patient Name: Bony Galvan  MRN: [id number]  Today's Date: 7/9/2024  Time Calculation  Start Time: 0310  Stop Time: 0400  Time Calculation (min): 50 min       PT Therapeutic Procedures Time Entry  Manual Therapy Time Entry: 35  Therapeutic Exercise Time Entry: 10                   INSURANCE:  Visit number: 10/14  Limit of 30 Visits     Surgery: ORIF of right both, acetabular fracture with percutaneous fixation of posterior pelvis on 4/7/2023  Referring Provider: Dr. Constantin Griffiths    CURRENT PROBLEM:  1. Pelvic pain in male  Follow Up In Physical Therapy      2. Closed displaced fracture of posterior wall of right acetabulum with routine healing, subsequent encounter  Follow Up In Physical Therapy        SUBJECTIVE:   General -   Pt is doing home exercises.  R hip and scar improving overall   Does feel like the cupping helped    Pain -    Pain Location: Pelvis and B hips  Pain Best: 6/10  Pain Today: 6/10  Pain Worst: 6/10     Precautions -    None    OBJECTIVE:     Treatment -   Therapeutic Exercise (75064) -  Bridges: x20  Hook-lying B hip AB: BTB x20  Supine Hip flexor stretch:   Educated on how to apply Kinesiology Tape  Manual Therapy (12699) -    STM to scar  Cupping and gliding to R abdominals and incision scar  Cupping and gliding to L obturator nerve emergent point, adductor canal, and saphenous homeostatic point.  Rock Tape Applied to R incision scar     OP Patient Education -   Access Code: [email or web address]  URL: [email or web address]  Date: 07/09/2024  Prepared by: Vanessa Edmond    Exercises  - Modified Oj Stretch  - 1-2 x daily - 3 sets - 30 seconds hold  - Bridge on Heels  - 1-2 x daily - 3 sets - 10 reps - 2 sec hold  - Hooklying Clamshell with Resistance  - 1-2 x daily - 2-3 sets - 10 reps    ASSESSMENT:     Cupping continued due to positive response from last visit. Cupping also performed to L adductor canal for reduced tension/pain with femoral nerve  pathway. Tape applied to R scar incision for reduced pain. Pt was re-printed exercises for home and updated t-band. Pt tolerated session well and is progressing towards functional needs. Continue to progress pt within tolerance and POC.    PLAN:    Continue to progress pt within tolerance. Assess response to cupping. Continue to add strengthening exercises for hips/pelvis/abdominals.

## 2024-07-22 DIAGNOSIS — F31.71 BIPOLAR DISORDER, IN PARTIAL REMISSION, MOST RECENT EPISODE HYPOMANIC (HCC): ICD-10-CM

## 2024-07-22 DIAGNOSIS — N52.9 ED (ERECTILE DYSFUNCTION) OF ORGANIC ORIGIN: ICD-10-CM

## 2024-07-22 RX ORDER — SILDENAFIL 100 MG/1
TABLET, FILM COATED ORAL
Qty: 30 TABLET | Refills: 0 | Status: SHIPPED | OUTPATIENT
Start: 2024-07-22

## 2024-07-22 RX ORDER — QUETIAPINE FUMARATE 100 MG/1
100 TABLET, FILM COATED ORAL NIGHTLY
Qty: 30 TABLET | Refills: 0 | Status: SHIPPED | OUTPATIENT
Start: 2024-07-22

## 2024-07-31 ENCOUNTER — APPOINTMENT (OUTPATIENT)
Dept: PAIN MEDICINE | Facility: CLINIC | Age: 49
End: 2024-07-31
Payer: MEDICAID

## 2024-07-31 DIAGNOSIS — G89.4 CHRONIC PAIN SYNDROME: ICD-10-CM

## 2024-07-31 DIAGNOSIS — M47.817 LUMBOSACRAL SPONDYLOSIS WITHOUT MYELOPATHY: ICD-10-CM

## 2024-07-31 RX ORDER — GABAPENTIN 300 MG/1
600 CAPSULE ORAL 4 TIMES DAILY
Qty: 240 CAPSULE | Refills: 2 | Status: SHIPPED | OUTPATIENT
Start: 2024-07-31 | End: 2024-10-29

## 2024-07-31 RX ORDER — DULOXETIN HYDROCHLORIDE 60 MG/1
60 CAPSULE, DELAYED RELEASE ORAL NIGHTLY
Qty: 30 CAPSULE | Refills: 2 | Status: SHIPPED | OUTPATIENT
Start: 2024-07-31 | End: 2024-10-29

## 2024-07-31 NOTE — TELEPHONE ENCOUNTER
Pt called upset he had to cancel his medication renewal follow up, UDS/CSA needs updated. I rescheduled patient for 8/14/24. Patient is aware we can not renew oxycodone until he updates his UDS. I advised him needs to get to nearest lab to complete, once completed we can give him a one time refill until his follow up to update CSA. Pt understands and is working on trying to find a reliable person to get him to his appointments since he currently doesn't have a license or car. Patient has contacted his  and will see if possible insurance can help get him transportation for his medical appointments. Pt understand our policy and will try to complete his obligations asap.

## 2024-08-14 ENCOUNTER — APPOINTMENT (OUTPATIENT)
Dept: PAIN MEDICINE | Facility: CLINIC | Age: 49
End: 2024-08-14
Payer: MEDICAID

## 2024-08-15 ENCOUNTER — APPOINTMENT (OUTPATIENT)
Dept: NEUROLOGY | Facility: CLINIC | Age: 49
End: 2024-08-15
Payer: MEDICAID

## 2024-08-18 DIAGNOSIS — F31.71 BIPOLAR DISORDER, IN PARTIAL REMISSION, MOST RECENT EPISODE HYPOMANIC (HCC): ICD-10-CM

## 2024-08-19 NOTE — TELEPHONE ENCOUNTER
Left voicemail to please call into the office at [phone] to schedule an appointment. You can also schedule through [employer]

## 2024-08-21 RX ORDER — QUETIAPINE FUMARATE 100 MG/1
100 TABLET, FILM COATED ORAL NIGHTLY
Qty: 30 TABLET | Refills: 0 | Status: SHIPPED | OUTPATIENT
Start: 2024-08-21

## 2024-08-27 NOTE — PROGRESS NOTES
"HPI    49 y.o. male being seen with the following problem list:     Problem list:  1.history of pelvic fracture  2. Gross hematuria at setting of his pelvic fracture, neg RUG and CT cystogram      transferred to [facility] from OSH 4/6/23 after a 20ft fall from a tree, +LOC. Workup demonstrated R acetabular and iliac wing fractures, bilateral inferior and superior pubic rami fractures and a pelvic sidewall hematoma. He was taken to IR for bilateral internal iliac embolization and aberrant L obturator embolization. From a urologic perspective, he had RUG and CT cystogram that were both neg for urologic injury, and a gayle was placed.      Was taken to OR for orthopaedic intervention 4/7, gayle removed and subsequently found to be in retention with gayle replaced 4/9. Subsequently passed TOV 4/14/23. Discharged to acute rehab 4/15/23     5/11/23 - comes in today for evaluation. voiding fine, had some GH in rehab but now cleared. PVR 0ml. slowly recovering. Recommended cysto     05/30/24 -bladder scan, 147cc voided 2 or 3 hours ago.  No urinary complaints.  Stream is strong.  No straining.  No splitting of the stream.  No hematuria.  No infections.  Notes ED, is on long-term opiates as well as gabapentin.    8/28/24 - seen over THV. Continues to urinate well without hematuria, weak stream, etc. Cialis working well, tolerating without side effects. Also wants an rx for viagra.    No results found for: \"PSA\"      Current Medications:  Current Outpatient Medications   Medication Sig Dispense Refill    buPROPion (Wellbutrin) 75 mg tablet Take 1 tablet (75 mg) by mouth once daily.      busPIRone (Buspar) 15 mg tablet Take 1 tablet (15 mg) by mouth 3 times a day.      docusate sodium (Colace) 100 mg capsule Take 1 capsule (100 mg) by mouth 2 times a day.      DULoxetine (Cymbalta) 60 mg DR capsule Take 1 capsule (60 mg) by mouth once daily at bedtime. Do not crush or chew. 30 capsule 2    gabapentin (Neurontin) 300 mg capsule " Take 2 capsules (600 mg) by mouth 4 times a day. 240 capsule 2    ibuprofen 800 mg tablet Take 1 tablet (800 mg) by mouth 2 times a day. 60 tablet 2    Mucinex 1,200 mg tablet extended release 12hr TAKE 1  BY MOUTH ONCE DAILY WITH BREAKFAST AND 1 WITH SUPPER 20 tablet 0    naloxone (Narcan) 4 mg/0.1 mL nasal spray CALL 911. SPR CONTENTS OF ONE SPRAYER (0.1ML) INTO ONE NOSTRIL. REPEAT IN 2-3 MIN IF SYMPTOMS OF OPIOID EMERGENCY PERSIST, ALTERNATE NOSTRILS      oxyCODONE (Roxicodone) 5 mg immediate release tablet Take 1 tablet (5 mg) by mouth 3 times a day. 90 tablet 0    QUEtiapine (SEROquel) 100 mg tablet TAKE 1 TO 2 TABLETS BY MOUTH AT BEDTIME AS NEEDED      sildenafil (Viagra) 100 mg tablet TAKE 1 TABLET BY MOUTH AS NEEDED FOR ERECTILE DYSFUNCTION. NO MORE THAN 1 TABLET IN 24 HOURS      tadalafil (Cialis) 20 mg tablet Take 1 tablet (20 mg) by mouth once daily as needed for erectile dysfunction. 30 tablet 5     No current facility-administered medications for this visit.        Active Problems:  Bony Galvan is a 49 y.o. male with the following Problems and Medications.  Patient Active Problem List   Diagnosis    Drug abuse in remission (Multi)    Erectile dysfunction    Debility    Closed displaced fracture of pelvis (Multi)    Chronic pain syndrome    Blood pressure elevated without history of HTN    Hyperglycemia    Attention deficit disorder    Chronic insomnia    Irritability and anger    Transaminasemia    Anemia, normocytic normochromic    Vitamin D deficiency    Astigmatism of both eyes    Cervical segment dysfunction    Chronic rhinitis    Hypertrophy of inferior nasal turbinate    Lumbosacral spondylosis without myelopathy    Nasal septal deviation    Presbyopia    Closed chip fracture of triquetrum of right wrist    Pelvic pain in male    PTSD (post-traumatic stress disorder)    Bipolar disorder, in partial remission, most recent episode hypomanic (Multi)    Drug-induced constipation    Urethral injury      Current Outpatient Medications   Medication Sig Dispense Refill    buPROPion (Wellbutrin) 75 mg tablet Take 1 tablet (75 mg) by mouth once daily.      busPIRone (Buspar) 15 mg tablet Take 1 tablet (15 mg) by mouth 3 times a day.      docusate sodium (Colace) 100 mg capsule Take 1 capsule (100 mg) by mouth 2 times a day.      DULoxetine (Cymbalta) 60 mg DR capsule Take 1 capsule (60 mg) by mouth once daily at bedtime. Do not crush or chew. 30 capsule 2    gabapentin (Neurontin) 300 mg capsule Take 2 capsules (600 mg) by mouth 4 times a day. 240 capsule 2    ibuprofen 800 mg tablet Take 1 tablet (800 mg) by mouth 2 times a day. 60 tablet 2    Mucinex 1,200 mg tablet extended release 12hr TAKE 1  BY MOUTH ONCE DAILY WITH BREAKFAST AND 1 WITH SUPPER 20 tablet 0    naloxone (Narcan) 4 mg/0.1 mL nasal spray CALL 911. SPR CONTENTS OF ONE SPRAYER (0.1ML) INTO ONE NOSTRIL. REPEAT IN 2-3 MIN IF SYMPTOMS OF OPIOID EMERGENCY PERSIST, ALTERNATE NOSTRILS      oxyCODONE (Roxicodone) 5 mg immediate release tablet Take 1 tablet (5 mg) by mouth 3 times a day. 90 tablet 0    QUEtiapine (SEROquel) 100 mg tablet TAKE 1 TO 2 TABLETS BY MOUTH AT BEDTIME AS NEEDED      sildenafil (Viagra) 100 mg tablet TAKE 1 TABLET BY MOUTH AS NEEDED FOR ERECTILE DYSFUNCTION. NO MORE THAN 1 TABLET IN 24 HOURS      tadalafil (Cialis) 20 mg tablet Take 1 tablet (20 mg) by mouth once daily as needed for erectile dysfunction. 30 tablet 5     No current facility-administered medications for this visit.       PMH:  No past medical history on file.    PSH:  Past Surgical History:   Procedure Laterality Date    IR ANGIOGRAM INFERIOR EPIGASTRIC  4/7/2023    IR ANGIOGRAM INFERIOR EPIGASTRIC [facility] ANGIO    IR ANGIOGRAM INFERIOR EPIGASTRIC PELVIC  4/7/2023    IR ANGIOGRAM INFERIOR EPIGASTRIC PELVIC [facility] ANGIO    IR ANGIOGRAM INFERIOR EPIGASTRIC PELVIC  4/7/2023    IR ANGIOGRAM INFERIOR EPIGASTRIC PELVIC [facility] ANGIO    IR EMBOLIZATION LYMPH NODE Bilateral 4/7/2023     IR EMBOLIZATION LYMPH NODE [facility] ANGIO       FMH:  Family History   Family history unknown: Yes       SHx:  Social History     Tobacco Use    Smoking status: Every Day     Current packs/day: 1.00     Average packs/day: 1 pack/day for 28.0 years (28.0 ttl pk-yrs)     Types: Cigarettes    Smokeless tobacco: Never   Substance Use Topics    Alcohol use: Not Currently    Drug use: Not Currently       Allergies:  No Known Allergies      Assessment/Plan  Still no urinary concerns s/p pelvic fracture. Cialis working well, would like an rx for viagra to use, knows not to use on same day as cialis.    Fu 1 year over thv, sooner prn        Scribe Attestation  By signing my name below, I, Akila Ball , Scribe attest that this documentation has been prepared under the direction and in the presence of Juan Rodriguez MD.

## 2024-08-28 ENCOUNTER — OFFICE VISIT (OUTPATIENT)
Dept: FAMILY MEDICINE CLINIC | Age: 49
End: 2024-08-28
Payer: COMMERCIAL

## 2024-08-28 ENCOUNTER — TELEMEDICINE (OUTPATIENT)
Dept: UROLOGY | Facility: HOSPITAL | Age: 49
End: 2024-08-28
Payer: MEDICAID

## 2024-08-28 VITALS
BODY MASS INDEX: 27.29 KG/M2 | DIASTOLIC BLOOD PRESSURE: 60 MMHG | RESPIRATION RATE: 16 BRPM | HEART RATE: 67 BPM | WEIGHT: 219.5 LBS | TEMPERATURE: 98.7 F | SYSTOLIC BLOOD PRESSURE: 108 MMHG | HEIGHT: 75 IN | OXYGEN SATURATION: 98 %

## 2024-08-28 DIAGNOSIS — J20.9 ACUTE BRONCHITIS, UNSPECIFIED ORGANISM: Primary | ICD-10-CM

## 2024-08-28 DIAGNOSIS — J01.40 ACUTE NON-RECURRENT PANSINUSITIS: ICD-10-CM

## 2024-08-28 DIAGNOSIS — N52.9 ERECTILE DYSFUNCTION, UNSPECIFIED ERECTILE DYSFUNCTION TYPE: Primary | ICD-10-CM

## 2024-08-28 DIAGNOSIS — S32.421D CLOSED DISPLACED FRACTURE OF POSTERIOR WALL OF RIGHT ACETABULUM WITH ROUTINE HEALING, SUBSEQUENT ENCOUNTER: ICD-10-CM

## 2024-08-28 DIAGNOSIS — R05.1 ACUTE COUGH: ICD-10-CM

## 2024-08-28 LAB
Lab: NORMAL
PERFORMING INSTRUMENT: NORMAL
QC PASS/FAIL: NORMAL
SARS-COV-2, POC: NORMAL

## 2024-08-28 PROCEDURE — 87426 SARSCOV CORONAVIRUS AG IA: CPT | Performed by: NURSE PRACTITIONER

## 2024-08-28 PROCEDURE — G8419 CALC BMI OUT NRM PARAM NOF/U: HCPCS | Performed by: NURSE PRACTITIONER

## 2024-08-28 PROCEDURE — G8427 DOCREV CUR MEDS BY ELIG CLIN: HCPCS | Performed by: NURSE PRACTITIONER

## 2024-08-28 PROCEDURE — 4004F PT TOBACCO SCREEN RCVD TLK: CPT | Performed by: NURSE PRACTITIONER

## 2024-08-28 PROCEDURE — 3017F COLORECTAL CA SCREEN DOC REV: CPT | Performed by: NURSE PRACTITIONER

## 2024-08-28 PROCEDURE — 99213 OFFICE O/P EST LOW 20 MIN: CPT | Performed by: NURSE PRACTITIONER

## 2024-08-28 PROCEDURE — 99214 OFFICE O/P EST MOD 30 MIN: CPT | Performed by: UROLOGY

## 2024-08-28 RX ORDER — DOXYCYCLINE HYCLATE 100 MG
100 TABLET ORAL 2 TIMES DAILY
Qty: 14 TABLET | Refills: 0 | Status: SHIPPED | OUTPATIENT
Start: 2024-08-28 | End: 2024-09-04

## 2024-08-28 RX ORDER — FLUTICASONE PROPIONATE 50 MCG
SPRAY, SUSPENSION (ML) NASAL
Qty: 16 G | Refills: 1 | Status: SHIPPED | OUTPATIENT
Start: 2024-08-28

## 2024-08-28 RX ORDER — CLONAZEPAM 0.5 MG/1
0.5 TABLET ORAL 3 TIMES DAILY
COMMUNITY
Start: 2024-07-30

## 2024-08-28 RX ORDER — GABAPENTIN 300 MG/1
300 CAPSULE ORAL
COMMUNITY
Start: 2024-08-22

## 2024-08-28 RX ORDER — GUAIFENESIN 600 MG/1
600 TABLET, EXTENDED RELEASE ORAL 2 TIMES DAILY
Qty: 30 TABLET | Refills: 1 | Status: SHIPPED | OUTPATIENT
Start: 2024-08-28 | End: 2024-09-04

## 2024-08-28 RX ORDER — OXYCODONE HYDROCHLORIDE 5 MG/1
5 TABLET ORAL 3 TIMES DAILY
COMMUNITY
Start: 2024-06-25

## 2024-08-28 RX ORDER — TADALAFIL 20 MG/1
20 TABLET ORAL PRN
COMMUNITY
Start: 2024-05-30 | End: 2025-05-30

## 2024-08-28 RX ORDER — DULOXETIN HYDROCHLORIDE 60 MG/1
60 CAPSULE, DELAYED RELEASE ORAL DAILY
COMMUNITY
Start: 2023-08-09

## 2024-08-28 RX ORDER — SILDENAFIL 100 MG/1
100 TABLET, FILM COATED ORAL AS NEEDED
Qty: 30 TABLET | Refills: 5 | Status: SHIPPED | OUTPATIENT
Start: 2024-08-28

## 2024-08-28 SDOH — ECONOMIC STABILITY: FOOD INSECURITY: WITHIN THE PAST 12 MONTHS, YOU WORRIED THAT YOUR FOOD WOULD RUN OUT BEFORE YOU GOT MONEY TO BUY MORE.: SOMETIMES TRUE

## 2024-08-28 SDOH — ECONOMIC STABILITY: FOOD INSECURITY: WITHIN THE PAST 12 MONTHS, THE FOOD YOU BOUGHT JUST DIDN'T LAST AND YOU DIDN'T HAVE MONEY TO GET MORE.: SOMETIMES TRUE

## 2024-08-28 SDOH — ECONOMIC STABILITY: INCOME INSECURITY: HOW HARD IS IT FOR YOU TO PAY FOR THE VERY BASICS LIKE FOOD, HOUSING, MEDICAL CARE, AND HEATING?: HARD

## 2024-08-28 ASSESSMENT — PATIENT HEALTH QUESTIONNAIRE - PHQ9
7. TROUBLE CONCENTRATING ON THINGS, SUCH AS READING THE NEWSPAPER OR WATCHING TELEVISION: MORE THAN HALF THE DAYS
4. FEELING TIRED OR HAVING LITTLE ENERGY: SEVERAL DAYS
6. FEELING BAD ABOUT YOURSELF - OR THAT YOU ARE A FAILURE OR HAVE LET YOURSELF OR YOUR FAMILY DOWN: NOT AT ALL
SUM OF ALL RESPONSES TO PHQ QUESTIONS 1-9: 6
1. LITTLE INTEREST OR PLEASURE IN DOING THINGS: NOT AT ALL
SUM OF ALL RESPONSES TO PHQ QUESTIONS 1-9: 6
SUM OF ALL RESPONSES TO PHQ QUESTIONS 1-9: 6
SUM OF ALL RESPONSES TO PHQ9 QUESTIONS 1 & 2: 0
10. IF YOU CHECKED OFF ANY PROBLEMS, HOW DIFFICULT HAVE THESE PROBLEMS MADE IT FOR YOU TO DO YOUR WORK, TAKE CARE OF THINGS AT HOME, OR GET ALONG WITH OTHER PEOPLE: VERY DIFFICULT
2. FEELING DOWN, DEPRESSED OR HOPELESS: NOT AT ALL
5. POOR APPETITE OR OVEREATING: NOT AT ALL
SUM OF ALL RESPONSES TO PHQ QUESTIONS 1-9: 6
8. MOVING OR SPEAKING SO SLOWLY THAT OTHER PEOPLE COULD HAVE NOTICED. OR THE OPPOSITE, BEING SO FIGETY OR RESTLESS THAT YOU HAVE BEEN MOVING AROUND A LOT MORE THAN USUAL: NOT AT ALL
9. THOUGHTS THAT YOU WOULD BE BETTER OFF DEAD, OR OF HURTING YOURSELF: NOT AT ALL
3. TROUBLE FALLING OR STAYING ASLEEP: NEARLY EVERY DAY

## 2024-08-28 ASSESSMENT — ENCOUNTER SYMPTOMS
SORE THROAT: 0
COUGH: 1
CHEST TIGHTNESS: 1
SHORTNESS OF BREATH: 1

## 2024-08-28 NOTE — PATIENT INSTRUCTIONS
[place] [employer] Resources*  (Call [employer]/211 if need more resources.)        MEDICAL:    [facility]   What they offer: LL[employer]D discounts fees for qualifying insured and uninsured persons.  We can assist you in signing up for Medicaid, Medicare, and Marketplace Exchange insurance plans.  They offer 4 locations in [place], 2 locations in [place] and 1 in [facility].    Phone Number: [phone]  Website: [email or web address]    [facility]:  What they offer: We provide health care services to the uninsured and underinsured. From providing quality care to connecting patients to critical community resources, our patients and their needs are our highest priority.  Phone number: [phone]  Website: https://[employer] Media   [facility] Financial Assistance:  What they offer: Assistance with medical bills  Phone number: [phone] option 5    UTILITY:         [employer]/211:  What they offer: Help find lower cost options for phone or internet as well as help paying utility bills.   Phone Number: 211  Website: https://beqom/get-help/utilities-expenses   [facility]  What they offer:  Assistance with utilities  Phone:  765.742.1719    [facility]   What they Offer: Assistance with utilities  Phone: 276.245.2604  Website: https://www.[employer]/    [facility] [place]  What they Offer: Assistance with utilities  Phone: 201.178.1138  Website: https://[employer].org/    MEDICATIONS:   Good Rx  What they offer: Good Rx tracks prescription drug prices and provides free drug coupons for discounts on medications.  Website: https://www.Bl[employer]uu/  [employer]:  What they offer: [employer] offers free information on medications and healthcare cost savings programs including prescription assistance programs, coupons, and discount programs.  Helpline: 460.218.3954  Website: https://www.needSigmoid Pharmaeds.org  RX Assist:  What they offer: Medication  https://[employer]/  [place] [employer]:  What they offer: Transportations to the [facility] and [facility], [facility], [facility], [facility], [facility] and [facility].  Cost is $2.00 each way for adults, $1.00 each way for seniors and those with disabilities, $1.00 each way for a student with an id.   Phone Number: 1-875.937.2862  Website: https://www.[employer]/[place]  Provide a ride:  What they offer:  Healthcare and medical transportation services 24 hours a day 7 days a week.   Phone Number: [phone]  Website: https://iSquare                [facility]  What they offer:  transportation to medical appointment-must be over 60  Phone: [phone]  Website: [email or web address]://Your.MD/

## 2024-08-28 NOTE — PROGRESS NOTES
Subjective  Narayan Nance, 49 y.o. male presents today with:  Chief Complaint   Patient presents with    Head Congestion     X2 days coughing, head congestion, runny nose       HPI  Presents to  for cough and congestion   Symptoms began in the last week   C/o nasal/sinus congestion, frontal headache & fatigue   Cough non-productive   C/o chest tightness   C/o exertional SOB  Denies wheezing   Denies N/V/D  Lessened appetite   Hydrating                           Past Medical History:   Diagnosis Date    Anxiety     Chronic back pain     Drug abuse (HCC)     pt states he has been clean since March 27,2017.  Heroin abuse        Past Surgical History:   Procedure Laterality Date    SEPTOPLASTY N/A 8/1/2019    SEPTOPLASTY, MICRODEBRIDER ASSISTED TURBINOPLASTY AND OUT FRACTURING BILATERAL NASAL ENDOSCOPY performed by Sae Pompa MD [facility] [place]    VASECTOMY       Family History   Problem Relation Age of Onset    No Known Problems Mother     Heart Disease Father         pacemaker    No Known Problems Sister     No Known Problems Brother     No Known Problems Son     No Known Problems Daughter            Review of Systems   Constitutional:  Negative for activity change, appetite change, chills and diaphoresis.   HENT:  Positive for congestion and ear pain. Negative for sore throat.    Respiratory:  Positive for cough, chest tightness and shortness of breath.    Neurological:  Negative for dizziness, light-headedness and headaches.         PMH, Surgical Hx, Family Hx, and Social Hx reviewed and updated.  Health Maintenance reviewed.          Objective  Vitals:    08/28/24 1203   BP: 108/60   Site: Left Upper Arm   Position: Sitting   Cuff Size: Large Adult   Pulse: 67   Resp: 16   Temp: 98.7 °F (37.1 °C)   SpO2: 98%   Weight: 99.6 kg (219 lb 8 oz)   Height: 1.905 m (6' 3\")     BP Readings from Last 3 Encounters:   08/28/24 108/60   05/18/23 (!) 177/93   05/16/23 137/86     Wt Readings from Last 3 Encounters:

## 2024-09-06 ENCOUNTER — APPOINTMENT (OUTPATIENT)
Dept: PAIN MEDICINE | Facility: CLINIC | Age: 49
End: 2024-09-06
Payer: MEDICAID

## 2024-09-11 ENCOUNTER — APPOINTMENT (OUTPATIENT)
Dept: PAIN MEDICINE | Facility: CLINIC | Age: 49
End: 2024-09-11
Payer: MEDICAID

## 2024-09-18 NOTE — ED NOTES
Admitted ~9 months prior   U/S scrotum/testicles tonight reviewed: hypertrophy hyperemia of left epididymitis, small left hydrocele     Plan:  Elevate scrotum, ice PRN  Multimodal analgesia      DISCHARGE INSTRUCTIONS GIVEN, PT DENIES ANY FURTHER QUESTIONS. PT AMBULATORY AT DISCHARGE. PT WIFE DRIVING HIM HOME.       Anna Rosa RN  02/08/22 2910

## 2024-09-20 ENCOUNTER — APPOINTMENT (OUTPATIENT)
Dept: PHYSICAL THERAPY | Facility: CLINIC | Age: 49
End: 2024-09-20
Payer: MEDICAID

## 2024-10-04 ENCOUNTER — APPOINTMENT (OUTPATIENT)
Dept: PAIN MEDICINE | Facility: CLINIC | Age: 49
End: 2024-10-04
Payer: MEDICAID

## 2024-10-09 ENCOUNTER — APPOINTMENT (OUTPATIENT)
Facility: CLINIC | Age: 49
End: 2024-10-09
Payer: MEDICAID

## 2024-10-16 ENCOUNTER — APPOINTMENT (OUTPATIENT)
Dept: PHYSICAL THERAPY | Facility: CLINIC | Age: 49
End: 2024-10-16
Payer: MEDICAID

## 2024-11-13 ENCOUNTER — APPOINTMENT (OUTPATIENT)
Facility: CLINIC | Age: 49
End: 2024-11-13
Payer: MEDICAID

## 2024-11-13 DIAGNOSIS — S32.301A CLOSED DISPLACED FRACTURE OF RIGHT ILIUM, UNSPECIFIED FRACTURE MORPHOLOGY, INITIAL ENCOUNTER: ICD-10-CM

## 2024-11-13 DIAGNOSIS — G89.4 CHRONIC PAIN SYNDROME: Primary | ICD-10-CM

## 2024-11-13 DIAGNOSIS — Z02.83 ENCOUNTER FOR DRUG SCREENING: ICD-10-CM

## 2024-11-13 PROCEDURE — 99214 OFFICE O/P EST MOD 30 MIN: CPT | Performed by: ANESTHESIOLOGY

## 2024-11-13 RX ORDER — BACLOFEN 10 MG/1
10 TABLET ORAL 3 TIMES DAILY
Qty: 90 TABLET | Refills: 0 | Status: SHIPPED | OUTPATIENT
Start: 2024-11-13 | End: 2024-12-13

## 2024-11-13 RX ORDER — MELOXICAM 15 MG/1
15 TABLET ORAL DAILY
Qty: 30 TABLET | Refills: 2 | Status: SHIPPED | OUTPATIENT
Start: 2024-11-13 | End: 2025-11-13

## 2024-11-13 ASSESSMENT — PAIN - FUNCTIONAL ASSESSMENT: PAIN_FUNCTIONAL_ASSESSMENT: 0-10

## 2024-11-13 ASSESSMENT — PAIN SCALES - GENERAL: PAINLEVEL_OUTOF10: 6

## 2024-11-13 NOTE — PROGRESS NOTES
History Of Present Illness  Bony Galvan is a 49 y.o. male presenting with  status post ORIF of bilateral acetabular fracture with percutaneous fixation of posterior pelvis April 2023 complaining of right-sided pelvic pain.  Patient describes his pain as a sharp knifelike in nature that is exacerbated with sitting and standing and is relieved by laying down.  Patient was initially maintained on oxycodone 5 mg 1 tablet 3 times daily, gabapentin 600 mg 4 times daily, duloxetine 60 mg once daily.  Patient stopped taking his gabapentin and duloxetine due to erectile dysfunction. Patient last visit was in May 19, 2024 and last oxycodone prescription was July 1, 2024.  Patient reports that his condition has significantly deteriorated after stopping all his medications.  He is currently following up with a psychiatrist for treatment of his depression and PTSD.  Patient is no longer doing any physical therapy or any muscle strengthening exercises.    Patient was requesting if he can have oxycodone for treatment of pain and that will help him to get back to doing physical therapy and muscle strengthening exercises.  Current pain medications include Klonopin 0.5 mg once daily, Wellbutrin 775 mg once daily Seroquel 100 mg once daily.       Past Medical History  He has no past medical history on file.    Surgical History  He has a past surgical history that includes IR embolization lymph node (Bilateral, 4/7/2023); IR angiogram inferior epigastric pelvic (4/7/2023); IR angiogram inferior epigastric (4/7/2023); and IR angiogram inferior epigastric pelvic (4/7/2023).     Social History  He reports that he has been smoking cigarettes. He has a 28 pack-year smoking history. He has never used smokeless tobacco. He reports that he does not currently use alcohol. He reports that he does not currently use drugs.    Family History  Family History   Family history unknown: Yes        Allergies  Patient has no known allergies.    Review of  systems:   13-point review of systems done and negative except as noted in HPI          Surgical History  He has a past surgical history that includes IR embolization lymph node (Bilateral, 4/7/2023); IR angiogram inferior epigastric pelvic (4/7/2023); IR angiogram inferior epigastric (4/7/2023); and IR angiogram inferior epigastric pelvic (4/7/2023).     Social History  He reports that he has been smoking cigarettes. He has a 28 pack-year smoking history. He has never used smokeless tobacco. He reports that he does not currently use alcohol. He reports that he does not currently use drugs.    Family History  Family History   Family history unknown: Yes        Allergies  Patient has no known allergies.    Review of systems:   13-point review of systems done and negative except as noted in HPI      Physical Exam   Vital signs: Reviewed  MSK:   No asymmetry or masses noted of the musculature.   Examination of the muscles/joints/bones show normal range of motion.  The patient is mildly tender to palpation in the cervical and lumbar paraspinal musculature as well as the medial lateral joint lines of both knees.    Neurologic:  Motor strength:   5/5 muscle strength of the upper extremities bilateral and equal.  5/5 muscle strength of the lower extremities bilaterally and equal.     Sensation:   Sensation intact to pin prick in the bilateral upper extremities.  Sensation intact to pin prick in the bilateral lower extremities.     Provocative tests: Negative Lora sign bilaterally, 2+ patellar reflexes bilaterally.    Psychiatric: Mood and affect are normal.   Physical Exam  Abdominal: Decreased sensation to touch and pinprick.  No tenderness on palpation.        Last Recorded Vitals  There were no vitals taken for this visit.    Relevant Results            Last OARRS Review: No data recorded    I have personally reviewed the OARRS report for Bony Galvan I have considered the risks of abuse, dependence, addiction and  diversion  Overdose risk score is 410     Assessment/Plan   Diagnoses and all orders for this visit:  Chronic pain syndrome  -     Referral to [facility]; Future  -     Referral to Occupational Therapy; Future  -     meloxicam (Mobic) 15 mg tablet; Take 1 tablet (15 mg) by mouth once daily.  -     baclofen (Lioresal) 10 mg tablet; Take 1 tablet (10 mg) by mouth 3 times a day.  Encounter for drug screening  Closed displaced fracture of right ilium, unspecified fracture morphology, initial encounter        I had a long discussion with the patient regarding his right-sided pelvic pain.  In view of the of the complete loss of sensation I feel that his pain is deafferentation in nature and stimulation would not be an option at this point.  I stressed the importance of not using opioids for chronic pain treatment.  I also recommended that he continues with his psychiatrist for treatment of his PTSD.  I encouraged the patient to get a functional capacity evaluation in order to get him ready to go back to work.  Patient seems to be agreeable with the plan.     Plan  Refer patient to [facility] for cognitive behavioral therapy  Baclofen 10 mg 3 times daily  Meloxicam 15 mg once daily  Refer patient for functional capacity evaluation  Consider differential epidural to rule out central pain    Curt Alvarez MD

## 2025-01-03 DIAGNOSIS — N52.9 ERECTILE DYSFUNCTION, UNSPECIFIED ERECTILE DYSFUNCTION TYPE: ICD-10-CM

## 2025-01-06 RX ORDER — TADALAFIL 20 MG/1
20 TABLET ORAL DAILY PRN
Qty: 30 TABLET | Refills: 5 | Status: SHIPPED | OUTPATIENT
Start: 2025-01-06 | End: 2026-01-06

## 2025-01-07 ENCOUNTER — APPOINTMENT (OUTPATIENT)
Dept: NEUROLOGY | Facility: CLINIC | Age: 50
End: 2025-01-07
Payer: MEDICAID

## 2025-03-25 ENCOUNTER — APPOINTMENT (OUTPATIENT)
Dept: PRIMARY CARE | Facility: CLINIC | Age: 50
End: 2025-03-25
Payer: MEDICAID

## 2025-03-25 VITALS
OXYGEN SATURATION: 97 % | HEART RATE: 60 BPM | HEIGHT: 75 IN | DIASTOLIC BLOOD PRESSURE: 78 MMHG | SYSTOLIC BLOOD PRESSURE: 128 MMHG | BODY MASS INDEX: 26.92 KG/M2 | WEIGHT: 216.5 LBS

## 2025-03-25 DIAGNOSIS — E55.9 VITAMIN D DEFICIENCY: ICD-10-CM

## 2025-03-25 DIAGNOSIS — Z12.11 SCREENING FOR COLON CANCER: ICD-10-CM

## 2025-03-25 DIAGNOSIS — Z12.2 SCREENING FOR LUNG CANCER: ICD-10-CM

## 2025-03-25 DIAGNOSIS — R09.82 POSTNASAL DRIP: ICD-10-CM

## 2025-03-25 DIAGNOSIS — E78.2 HYPERCHOLESTEROLEMIA WITH HYPERTRIGLYCERIDEMIA: ICD-10-CM

## 2025-03-25 DIAGNOSIS — R73.9 HYPERGLYCEMIA: ICD-10-CM

## 2025-03-25 DIAGNOSIS — J30.89 NON-SEASONAL ALLERGIC RHINITIS, UNSPECIFIED TRIGGER: Primary | ICD-10-CM

## 2025-03-25 DIAGNOSIS — R03.0 BLOOD PRESSURE ELEVATED WITHOUT HISTORY OF HTN: ICD-10-CM

## 2025-03-25 DIAGNOSIS — F17.218 CIGARETTE NICOTINE DEPENDENCE WITH OTHER NICOTINE-INDUCED DISORDER: ICD-10-CM

## 2025-03-25 DIAGNOSIS — G89.4 CHRONIC PAIN SYNDROME: ICD-10-CM

## 2025-03-25 DIAGNOSIS — D64.9 ANEMIA, NORMOCYTIC NORMOCHROMIC: ICD-10-CM

## 2025-03-25 DIAGNOSIS — Z11.59 ENCOUNTER FOR HEPATITIS C SCREENING TEST FOR LOW RISK PATIENT: ICD-10-CM

## 2025-03-25 PROCEDURE — 99215 OFFICE O/P EST HI 40 MIN: CPT | Performed by: INTERNAL MEDICINE

## 2025-03-25 PROCEDURE — 3008F BODY MASS INDEX DOCD: CPT | Performed by: INTERNAL MEDICINE

## 2025-03-25 RX ORDER — FLUTICASONE PROPIONATE 50 MCG
1 SPRAY, SUSPENSION (ML) NASAL DAILY
COMMUNITY
End: 2025-03-25 | Stop reason: SDUPTHER

## 2025-03-25 RX ORDER — FLUTICASONE PROPIONATE 50 MCG
SPRAY, SUSPENSION (ML) NASAL
Qty: 16 G | Refills: 2 | Status: SHIPPED | OUTPATIENT
Start: 2025-03-25

## 2025-03-25 NOTE — PATIENT INSTRUCTIONS
Thank you very much for coming.  It is nice to see you again.  It has been a long time.  I have to admit that I had to review your chart to familiarize myself again with your case.    It will be easy to refill your FLUTICASONE as soon as we are able to establish a diagnosis for its use.  From your history, you have POSTNASAL DRIP, as well as a history of ALLERGIC RHINITIS for which there has been no determined cause or allergen.  I will order it the way you have been using it, but your insurance might allow only 1 spray to each nostril twice a day.  Let us see.  Please make sure to drink lots of fluids throughout the day to avoid dehydration of your nasal passages.    Cutting back on SMOKING will also decrease the irritation to your breathing system.  Unfortunately, I cannot order a NICOTINE PATCH without making sure that it is safe for you to take this regimen.  Do not worry, we will do fasting laboratory examinations, and when you return, we will also do an EKG.    Unfortunately, I will not be able to vouch for your use of your PSYCHOACTIVE regimens, QUETIAPINE/SEROQUEL.  I also see that you have a history of use of BUSPIRONE and DULOXETINE, as well as GABAPENTIN.  These all affect your brain function, and as such, should be reviewed by your PSYCHIATRIST.  I will fill out the form to the best of my ability and I will fax it over, but they will most likely want you to be seen by your psychiatrist so that he can vouch for your safety.    In the meantime, let us do some FASTING laboratory examinations, then see me soon after.  I will send word sooner with results and possible changes.    Until then, start cutting back on smoking.  Try and see if you can manage with only 15 cigarettes a day.  Try to spend more time with people who do not smoke.  Try to keep yourself, especially your hands, busy.  Postpone the first cigarette of the day until perhaps later in the morning or even after lunch.    Again, it is nice to see  you.  Please come back in 3 to 4 weeks, so that we can prioritize issues.  Until then, please continue to take care of yourself and your family, and please continue to pray for our recovery from this pandemic.  I hope you have a happy Easter.  Take care and God bless.            0

## 2025-03-25 NOTE — PROGRESS NOTES
"Subjective   Patient ID: Bony Galvan is a 50 y.o. male who presents for Follow-up (Patient presented today for a 6 month follow up./).    HPI     Review of Systems    Objective   /78 (BP Location: Left arm, Patient Position: Sitting, BP Cuff Size: Adult)   Pulse 60   Ht 1.905 m (6' 3\")   Wt 98.2 kg (216 lb 8 oz)   SpO2 97%   BMI 27.06 kg/m²     Physical Exam    Assessment/Plan   Diagnoses and all orders for this visit:  Non-seasonal allergic rhinitis, unspecified trigger  -     fluticasone (Flonase) 50 mcg/actuation nasal spray; Administer 2 sprays to each nostril after breakfast time, and again 2 sprays to each nostril after suppertime.  Please use twice a day even if you are not going to eat.  Lots of fluids throughout the day.  Thank you.  -     CBC and Auto Differential; Future  -     Magnesium; Future  Postnasal drip  -     fluticasone (Flonase) 50 mcg/actuation nasal spray; Administer 2 sprays to each nostril after breakfast time, and again 2 sprays to each nostril after suppertime.  Please use twice a day even if you are not going to eat.  Lots of fluids throughout the day.  Thank you.  -     CBC and Auto Differential; Future  -     Comprehensive Metabolic Panel; Future  -     TSH with reflex to Free T4 if abnormal; Future  Cigarette nicotine dependence with other nicotine-induced disorder  -     CBC and Auto Differential; Future  -     Comprehensive Metabolic Panel; Future  Blood pressure elevated without history of HTN  -     CBC and Auto Differential; Future  -     Comprehensive Metabolic Panel; Future  -     TSH with reflex to Free T4 if abnormal; Future  -     Magnesium; Future  -     Urinalysis with Reflex Culture and Microscopic; Future  -     Drug Screen, Urine With Reflex to Confirmation; Future  Hyperglycemia  -     Comprehensive Metabolic Panel; Future  -     Hemoglobin A1C; Future  Hypercholesterolemia with hypertriglyceridemia  -     Comprehensive Metabolic Panel; Future  -     Lipid " Panel; Future  Anemia, normocytic normochromic  -     CBC and Auto Differential; Future  -     Urinalysis with Reflex Culture and Microscopic; Future  -     Vitamin B12; Future  -     Folate; Future  -     Ferritin; Future  -     Iron and TIBC; Future  Vitamin D deficiency  -     CBC and Auto Differential; Future  -     Vitamin D 25-Hydroxy,Total (for eval of Vitamin D levels); Future  Chronic pain syndrome  -     Comprehensive Metabolic Panel; Future  -     Drug Screen, Urine With Reflex to Confirmation; Future  Encounter for hepatitis C screening test for low risk patient  -     CBC and Auto Differential; Future  -     Hepatitis C Antibody; Future  Screening for colon cancer  -     CBC and Auto Differential; Future  Screening for lung cancer  -     CBC and Auto Differential; Future       Thank you very much for coming.  It is nice to see you again.  It has been a long time.  I have to admit that I had to review your chart to familiarize myself again with your case.    It will be easy to refill your FLUTICASONE as soon as we are able to establish a diagnosis for its use.  From your history, you have POSTNASAL DRIP, as well as a history of ALLERGIC RHINITIS for which there has been no determined cause or allergen.  I will order it the way you have been using it, but your insurance might allow only 1 spray to each nostril twice a day.  Let us see.  Please make sure to drink lots of fluids throughout the day to avoid dehydration of your nasal passages.    Cutting back on SMOKING will also decrease the irritation to your breathing system.  Unfortunately, I cannot order a NICOTINE PATCH without making sure that it is safe for you to take this regimen.  Do not worry, we will do fasting laboratory examinations, and when you return, we will also do an EKG.    Unfortunately, I will not be able to vouch for your use of your PSYCHOACTIVE regimens, QUETIAPINE/SEROQUEL.  I also see that you have a history of use of BUSPIRONE  and DULOXETINE, as well as GABAPENTIN.  These all affect your brain function, and as such, should be reviewed by your PSYCHIATRIST.  I will fill out the form to the best of my ability and I will fax it over, but they will most likely want you to be seen by your psychiatrist so that he can vouch for your safety.    In the meantime, let us do some FASTING laboratory examinations, then see me soon after.  I will send word sooner with results and possible changes.    Until then, start cutting back on smoking.  Try and see if you can manage with only 15 cigarettes a day.  Try to spend more time with people who do not smoke.  Try to keep yourself, especially your hands, busy.  Postpone the first cigarette of the day until perhaps later in the morning or even after lunch.    Again, it is nice to see you.  Please come back in 3 to 4 weeks, so that we can prioritize issues.  Until then, please continue to take care of yourself and your family, and please continue to pray for our recovery from this pandemic.  I hope you have a happy Easter.  Take care and God bless.            0  Lost to follow-up, now here asking for medications and hoping to get things expedited.  I explained to him that his psychoactive medications will have to go through his PSYCHIATRIST, and that he has to reestablish, and not just ask for refills, because it will ultimately affect his safety.    I told him also that it is not urgent for him to donate ASTHMA, but he pointed out that he actually could use the financial help.  I then pointed out that until we know where his ANEMIA is, that he will not be able to donate blood products.  He understands what the plasma does to his system, and he is willing to wait.    ALSO, he does understand that he will need a COLONOSCOPY if indeed he continues to have ANEMIA, not just a stool smear or Cologuard sample.    The patient also understands that we cannot do a NICOTINE PATCH without making sure that he is  hemodynamically stable.  Checking fasting laboratory examinations.  Doing twelve-lead EKG when he returns, then counseling regarding smoking, which I am glad to see the patient is looking forward to cutting back.    Preventive strategies reviewed, he will also benefit from CT scan of his lungs with history of smoking.    Plans as above.  Return in 3 weeks.  20 minutes please.  Twelve-lead EKG.  Review FASTING laboratory results.  Hopefully, coordinate with psychiatry recommendations and plans.  Prioritize issues.  Review preventive strategies, cardiovascular risk.            0

## 2025-04-22 LAB
25(OH)D3+25(OH)D2 SERPL-MCNC: 29 NG/ML (ref 30–100)
ALBUMIN SERPL-MCNC: 4.5 G/DL (ref 3.6–5.1)
ALP SERPL-CCNC: 65 U/L (ref 35–144)
ALT SERPL-CCNC: 16 U/L (ref 9–46)
ANION GAP SERPL CALCULATED.4IONS-SCNC: 8 MMOL/L (CALC) (ref 7–17)
AST SERPL-CCNC: 18 U/L (ref 10–35)
BASOPHILS # BLD AUTO: 59 CELLS/UL (ref 0–200)
BASOPHILS NFR BLD AUTO: 1.1 %
BILIRUB SERPL-MCNC: 0.5 MG/DL (ref 0.2–1.2)
BUN SERPL-MCNC: 19 MG/DL (ref 7–25)
CALCIUM SERPL-MCNC: 9.2 MG/DL (ref 8.6–10.3)
CHLORIDE SERPL-SCNC: 107 MMOL/L (ref 98–110)
CHOLEST SERPL-MCNC: 161 MG/DL
CHOLEST/HDLC SERPL: 2.8 (CALC)
CO2 SERPL-SCNC: 28 MMOL/L (ref 20–32)
CREAT SERPL-MCNC: 0.79 MG/DL (ref 0.7–1.3)
EGFRCR SERPLBLD CKD-EPI 2021: 107 ML/MIN/1.73M2
EOSINOPHIL # BLD AUTO: 130 CELLS/UL (ref 15–500)
EOSINOPHIL NFR BLD AUTO: 2.4 %
ERYTHROCYTE [DISTWIDTH] IN BLOOD BY AUTOMATED COUNT: 12.5 % (ref 11–15)
EST. AVERAGE GLUCOSE BLD GHB EST-MCNC: 114 MG/DL
EST. AVERAGE GLUCOSE BLD GHB EST-SCNC: 6.3 MMOL/L
FERRITIN SERPL-MCNC: 69 NG/ML (ref 38–380)
FOLATE SERPL-MCNC: 12.6 NG/ML
GLUCOSE SERPL-MCNC: 97 MG/DL (ref 65–99)
HBA1C MFR BLD: 5.6 %
HCT VFR BLD AUTO: 43.4 % (ref 38.5–50)
HCV AB SERPL QL IA: NORMAL
HDLC SERPL-MCNC: 57 MG/DL
HGB BLD-MCNC: 14.6 G/DL (ref 13.2–17.1)
IRON SATN MFR SERPL: 38 % (CALC) (ref 20–48)
IRON SERPL-MCNC: 103 MCG/DL (ref 50–180)
LDLC SERPL CALC-MCNC: 88 MG/DL (CALC)
LYMPHOCYTES # BLD AUTO: 1777 CELLS/UL (ref 850–3900)
LYMPHOCYTES NFR BLD AUTO: 32.9 %
MAGNESIUM SERPL-MCNC: 2.5 MG/DL (ref 1.5–2.5)
MCH RBC QN AUTO: 31.1 PG (ref 27–33)
MCHC RBC AUTO-ENTMCNC: 33.6 G/DL (ref 32–36)
MCV RBC AUTO: 92.3 FL (ref 80–100)
MONOCYTES # BLD AUTO: 340 CELLS/UL (ref 200–950)
MONOCYTES NFR BLD AUTO: 6.3 %
NEUTROPHILS # BLD AUTO: 3094 CELLS/UL (ref 1500–7800)
NEUTROPHILS NFR BLD AUTO: 57.3 %
NONHDLC SERPL-MCNC: 104 MG/DL (CALC)
PLATELET # BLD AUTO: 219 THOUSAND/UL (ref 140–400)
PMV BLD REES-ECKER: 10 FL (ref 7.5–12.5)
POTASSIUM SERPL-SCNC: 4.5 MMOL/L (ref 3.5–5.3)
PROT SERPL-MCNC: 6.3 G/DL (ref 6.1–8.1)
RBC # BLD AUTO: 4.7 MILLION/UL (ref 4.2–5.8)
SODIUM SERPL-SCNC: 143 MMOL/L (ref 135–146)
TIBC SERPL-MCNC: 270 MCG/DL (CALC) (ref 250–425)
TRIGL SERPL-MCNC: 74 MG/DL
TSH SERPL-ACNC: 0.85 MIU/L (ref 0.4–4.5)
VIT B12 SERPL-MCNC: 226 PG/ML (ref 200–1100)
WBC # BLD AUTO: 5.4 THOUSAND/UL (ref 3.8–10.8)

## 2025-04-23 ENCOUNTER — APPOINTMENT (OUTPATIENT)
Dept: PRIMARY CARE | Facility: CLINIC | Age: 50
End: 2025-04-23
Payer: MEDICAID

## 2025-04-23 VITALS
SYSTOLIC BLOOD PRESSURE: 118 MMHG | BODY MASS INDEX: 25.99 KG/M2 | DIASTOLIC BLOOD PRESSURE: 77 MMHG | OXYGEN SATURATION: 97 % | HEART RATE: 66 BPM | HEIGHT: 75 IN | WEIGHT: 209 LBS

## 2025-04-23 DIAGNOSIS — E78.2 HYPERCHOLESTEROLEMIA WITH HYPERTRIGLYCERIDEMIA: Primary | ICD-10-CM

## 2025-04-23 DIAGNOSIS — F43.10 PTSD (POST-TRAUMATIC STRESS DISORDER): ICD-10-CM

## 2025-04-23 DIAGNOSIS — G89.4 CHRONIC PAIN SYNDROME: ICD-10-CM

## 2025-04-23 DIAGNOSIS — R73.9 HYPERGLYCEMIA: ICD-10-CM

## 2025-04-23 DIAGNOSIS — Z79.899 MEDICAL MARIJUANA USE: ICD-10-CM

## 2025-04-23 DIAGNOSIS — Z12.2 SCREENING FOR LUNG CANCER: ICD-10-CM

## 2025-04-23 DIAGNOSIS — F17.218 CIGARETTE NICOTINE DEPENDENCE WITH OTHER NICOTINE-INDUCED DISORDER: ICD-10-CM

## 2025-04-23 DIAGNOSIS — R03.0 BLOOD PRESSURE ELEVATED WITHOUT HISTORY OF HTN: ICD-10-CM

## 2025-04-23 DIAGNOSIS — F31.71 BIPOLAR DISORDER, IN PARTIAL REMISSION, MOST RECENT EPISODE HYPOMANIC (MULTI): ICD-10-CM

## 2025-04-23 DIAGNOSIS — F90.2 ATTENTION DEFICIT HYPERACTIVITY DISORDER (ADHD), COMBINED TYPE: ICD-10-CM

## 2025-04-23 DIAGNOSIS — E55.9 VITAMIN D DEFICIENCY: ICD-10-CM

## 2025-04-23 DIAGNOSIS — Z12.11 SCREENING FOR COLON CANCER: ICD-10-CM

## 2025-04-23 PROCEDURE — 99406 BEHAV CHNG SMOKING 3-10 MIN: CPT | Performed by: INTERNAL MEDICINE

## 2025-04-23 PROCEDURE — 3008F BODY MASS INDEX DOCD: CPT | Performed by: INTERNAL MEDICINE

## 2025-04-23 PROCEDURE — 99213 OFFICE O/P EST LOW 20 MIN: CPT | Performed by: INTERNAL MEDICINE

## 2025-04-23 RX ORDER — IBUPROFEN 200 MG
TABLET ORAL
Qty: 30 PATCH | Refills: 0 | Status: SHIPPED | OUTPATIENT
Start: 2025-04-23

## 2025-04-23 RX ORDER — ACETAMINOPHEN 500 MG
50 TABLET ORAL DAILY
Qty: 90 CAPSULE | Refills: 1 | Status: SHIPPED | OUTPATIENT
Start: 2025-04-23

## 2025-04-23 NOTE — PROGRESS NOTES
"Subjective   Patient ID: Bony Galvan is a 50 y.o. male who presents for Follow-up (Patient presented today for a 4 week follow up./).    HPI   The patient is here for reevaluation of history of ANEMIA.  No gum or nose bleeding.  No easy bruisability.  No apparent blood in urine or stool.  No particular dyspnea on exertion, no fatigue.  The patient states that he actually has been feeling good, and is looking forward to cutting back on smoking cigarettes.  Eventually, he would like to quit.  CONSTITUTIONALLY, no fever, no chills.  No night sweats.  No lingering anorexia or nausea.  No apparent lymphadenopathy.  No apparent weight loss.    Further, he would like to donate PLASMA once more.  Again, no constitutional symptoms.    Currently, he smokes 2 packs a day, and has been doing so for the better part of 20 years, perhaps even 30 years.  Occasional cough, no particular sputum production.  No jorge alberto substernal chest pain, no orthopnea, no paroxysmal nocturnal dyspnea.    No headache, blurred vision, no diplopia, no dysphagia.  Continues to want to improve quality of life, and does not wish harm to self or others.  Follows with psychiatry and chronic pain.  Uses medical MARIJUANA, not worried about memory.    Appetite preserved, no abdominal distress.  No dysuria.  No skin changes.  ENDOCRINE with no polyuria, polydipsia, polyphagia.  No blurred vision.  No skin, hair, nail changes.  No dramatic weight loss or weight gain.          Review of Systems  Review of systems as in history of present illness, and otherwise, reviewed separately as well, and was unremarkable/negative/noncontributory.        Objective   /77 (BP Location: Left arm, Patient Position: Sitting, BP Cuff Size: Adult)   Pulse 66   Ht 1.905 m (6' 3\")   Wt 94.8 kg (209 lb)   SpO2 97%   BMI 26.12 kg/m²     Physical Exam  In good spirits.  Not in distress or diaphoresis.  Alert, oriented x 3.  Amiable.  Not unkempt.  Does want to improve quality " of life, and does not wish harm to self or others.  Moderately built.  Seemingly appropriate.    HEAD pink palpebral conjunctivae, anicteric sclerae.  NECK supple, no apparent jugular venous distention.  CARDIOVASCULAR not in distress or diaphoresis.  No bipedal edema.  LUNGS not in distress or diaphoresis.  Not using accessory muscles.  ABDOMEN soft, nontender.  BACK no costovertebral angle tenderness.  EXTREMITIES no clubbing, no cyanosis.  NEURO no facial asymmetry.  No apparent cranial nerve deficits.  Romberg negative.  Ambulating without need of assistance.  No apparent focal weakness.  No tremors.  PSYCH receptive, appropriate, and eager to maintain and improve quality of life.        LABORATORY results reviewed, hemoglobin A1c 4.6, current BMI of 26.12.  LDL cholesterol 88, ASCVD risk 5%.  Vitamin D 29.        Assessment/Plan   Diagnoses and all orders for this visit:  Hypercholesterolemia with hypertriglyceridemia  -     Comprehensive Metabolic Panel; Future  -     Lipid Panel; Future  Hyperglycemia  -     Urinalysis with Reflex Culture and Microscopic  -     Comprehensive Metabolic Panel; Future  -     Hemoglobin A1C; Future  Blood pressure elevated without history of HTN  -     Urinalysis with Reflex Culture and Microscopic  -     CBC and Auto Differential; Future  -     Comprehensive Metabolic Panel; Future  -     Lipid Panel; Future  -     Hemoglobin A1C; Future  Cigarette nicotine dependence with other nicotine-induced disorder  -     CT lung screening low dose; Future  -     nicotine (Nicoderm CQ) 21 mg/24 hr patch; Please apply on to skin at bedtime, and replace each evening at bedtime.  If you are to smoke, remove the patch and wait 30 minutes.  Do not replace patch until bedtime.  Rotate areas of application.  Thank you.  -     CBC and Auto Differential; Future  Medical marijuana use  -     CBC and Auto Differential; Future  -     Comprehensive Metabolic Panel; Future  Vitamin D deficiency  -      cholecalciferol (Vitamin D3) 50 mcg (2,000 units) capsule; Take 1 capsule (50 mcg) by mouth once daily.  -     Comprehensive Metabolic Panel; Future  -     Vitamin D 25-Hydroxy,Total (for eval of Vitamin D levels); Future  Screening for lung cancer  -     CT lung screening low dose; Future  -     CBC and Auto Differential; Future  Screening for colon cancer  -     Referral to Gastroenterology; Future  -     CBC and Auto Differential; Future  Chronic pain syndrome  PTSD (post-traumatic stress disorder)  Bipolar disorder, in partial remission, most recent episode hypomanic (Multi)  Attention deficit hyperactivity disorder (ADHD), combined type       Thank you very much for coming.  It is nice to see you.    You do not have any anemia at the moment.  You can go ahead and donate plasma.  I will update the paperwork that you sent over, and I will send it back to the company.    Let us recheck your blood count in 4 months.  We can have you repeat FASTING laboratory examinations at that point.    In the meantime, your blood pressure is good.  You do not need a urine drug screen, but it will help to do a routine urinalysis to check your kidney function.  I will send word regarding results and possible changes.    You will benefit from a CT SCAN of your chest because of your significant SMOKING history.  In the meantime, I am glad to hear that you are eager to cut back, if not quit.  Of course, cutting back would be great, especially with the price of cigarettes!    I have ordered a NICOTINE PATCH for you to use!  Apply it onto your skin at bedtime, during which you are not going to be smoking anyway!    When you wake up in the morning, try your best not to have that first cigarette of the day.  Try and see if you can postpone smoking until after lunch, for instance.  When the time comes that you cannot help but have a cigarette, please REMOVE the patch and WAIT 30 minutes before having a cigarette.  You do not need to  replace the patch until bedtime.    Replace your nicotine patch at bedtime every evening.  Rotate the areas that you apply your nicotine patch.  It can be at any bare skin area that you have.  Watch out for irritation.  Of course, do not apply onto your face or mucous membranes.    Try your best to spend more time with people who do not smoke.  Keep yourself, especially your hands, busy, so that you do not smoke.    Please continue to follow with your psychiatrist and your chronic pain specialist.  Please be careful with use of medical marijuana.  Make sure that it does not cause you to be sleepy or disoriented.    You will also benefit from having a COLONOSCOPY done anytime soon.  I will have you see a GASTROENTEROLOGIST to prepare you for this.    Look into getting yourself vaccinated for SHINGLES!  Highly recommended, to be paid for by your insurance.  Please get this from your local friendly pharmacy.    Again, it is nice to see you.  Happy Easter!  Come back in 4 months and we will see how you are doing with your smoking and we will recheck your blood pressure.  Do some FASTING laboratory examinations a few days prior, so that we can check your overall risk for heart attack or stroke in the future.  Take care and God bless.  Happy Easter!            0  Return in 4 months.  20 minutes please.  Repeat FASTING laboratory examination, then see me for reevaluation of smoking, preventive strategies, cardiovascular risk.  Coordinate with psychiatry, chronic pain.  Reassess for any return of anemia, history of plasma donation.            0

## 2025-04-23 NOTE — PATIENT INSTRUCTIONS
Thank you very much for coming.  It is nice to see you.    You do not have any anemia at the moment.  You can go ahead and donate plasma.  I will update the paperwork that you sent over, and I will send it back to the company.    Let us recheck your blood count in 4 months.  We can have you repeat FASTING laboratory examinations at that point.    In the meantime, your blood pressure is good.  You do not need a urine drug screen, but it will help to do a routine urinalysis to check your kidney function.  I will send word regarding results and possible changes.    You will benefit from a CT SCAN of your chest because of your significant SMOKING history.  In the meantime, I am glad to hear that you are eager to cut back, if not quit.  Of course, cutting back would be great, especially with the price of cigarettes!    I have ordered a NICOTINE PATCH for you to use!  Apply it onto your skin at bedtime, during which you are not going to be smoking anyway!    When you wake up in the morning, try your best not to have that first cigarette of the day.  Try and see if you can postpone smoking until after lunch, for instance.  When the time comes that you cannot help but have a cigarette, please REMOVE the patch and WAIT 30 minutes before having a cigarette.  You do not need to replace the patch until bedtime.    Replace your nicotine patch at bedtime every evening.  Rotate the areas that you apply your nicotine patch.  It can be at any bare skin area that you have.  Watch out for irritation.  Of course, do not apply onto your face or mucous membranes.    Try your best to spend more time with people who do not smoke.  Keep yourself, especially your hands, busy, so that you do not smoke.    Please continue to follow with your psychiatrist and your chronic pain specialist.  Please be careful with use of medical marijuana.  Make sure that it does not cause you to be sleepy or disoriented.    You will also benefit from having a  COLONOSCOPY done anytime soon.  I will have you see a GASTROENTEROLOGIST to prepare you for this.    Look into getting yourself vaccinated for SHINGLES!  Highly recommended, to be paid for by your insurance.  Please get this from your local friendly pharmacy.    Again, it is nice to see you.  Happy Easter!  Come back in 4 months and we will see how you are doing with your smoking and we will recheck your blood pressure.  Do some FASTING laboratory examinations a few days prior, so that we can check your overall risk for heart attack or stroke in the future.  Take care and God bless.  Happy Easter!            0  Return in 4 months.  20 minutes please.  Repeat FASTING laboratory examination, then see me for reevaluation of smoking, preventive strategies, cardiovascular risk.  Coordinate with psychiatry, chronic pain.  Reassess for any return of anemia, history of plasma donation.            0

## 2025-04-24 LAB
APPEARANCE UR: CLEAR
BACTERIA #/AREA URNS HPF: NORMAL /HPF
BACTERIA UR CULT: NORMAL
BILIRUB UR QL STRIP: NEGATIVE
COLOR UR: YELLOW
GLUCOSE UR QL STRIP: NEGATIVE
HGB UR QL STRIP: NEGATIVE
HYALINE CASTS #/AREA URNS LPF: NORMAL /LPF
KETONES UR QL STRIP: NEGATIVE
LEUKOCYTE ESTERASE UR QL STRIP: NEGATIVE
NITRITE UR QL STRIP: NEGATIVE
PH UR STRIP: 5.5 [PH] (ref 5–8)
PROT UR QL STRIP: NEGATIVE
RBC #/AREA URNS HPF: NORMAL /HPF
SERVICE CMNT-IMP: NORMAL
SP GR UR STRIP: 1.02 (ref 1–1.03)
SQUAMOUS #/AREA URNS HPF: NORMAL /HPF
WBC #/AREA URNS HPF: NORMAL /HPF

## 2025-05-06 DIAGNOSIS — J30.89 NON-SEASONAL ALLERGIC RHINITIS, UNSPECIFIED TRIGGER: ICD-10-CM

## 2025-05-06 DIAGNOSIS — R09.82 POSTNASAL DRIP: ICD-10-CM

## 2025-05-07 ENCOUNTER — HOSPITAL ENCOUNTER (OUTPATIENT)
Dept: RADIOLOGY | Facility: HOSPITAL | Age: 50
Discharge: HOME | End: 2025-05-07
Payer: MEDICAID

## 2025-05-07 DIAGNOSIS — Z12.2 SCREENING FOR LUNG CANCER: ICD-10-CM

## 2025-05-07 DIAGNOSIS — F17.218 CIGARETTE NICOTINE DEPENDENCE WITH OTHER NICOTINE-INDUCED DISORDER: ICD-10-CM

## 2025-05-07 PROCEDURE — 71271 CT THORAX LUNG CANCER SCR C-: CPT

## 2025-05-09 ENCOUNTER — APPOINTMENT (OUTPATIENT)
Dept: PRIMARY CARE | Facility: CLINIC | Age: 50
End: 2025-05-09
Payer: MEDICAID

## 2025-05-09 RX ORDER — FLUTICASONE PROPIONATE 50 MCG
SPRAY, SUSPENSION (ML) NASAL
Qty: 16 G | Refills: 5 | Status: SHIPPED | OUTPATIENT
Start: 2025-05-09

## 2025-06-10 DIAGNOSIS — N52.9 ERECTILE DYSFUNCTION, UNSPECIFIED ERECTILE DYSFUNCTION TYPE: ICD-10-CM

## 2025-06-10 RX ORDER — TADALAFIL 20 MG/1
20 TABLET ORAL DAILY PRN
Qty: 30 TABLET | Refills: 5 | Status: SHIPPED | OUTPATIENT
Start: 2025-06-10 | End: 2026-06-10

## 2025-06-27 ENCOUNTER — APPOINTMENT (OUTPATIENT)
Dept: PRIMARY CARE | Facility: CLINIC | Age: 50
End: 2025-06-27
Payer: MEDICAID

## 2025-06-27 VITALS
BODY MASS INDEX: 25.75 KG/M2 | HEART RATE: 72 BPM | HEIGHT: 75 IN | SYSTOLIC BLOOD PRESSURE: 110 MMHG | DIASTOLIC BLOOD PRESSURE: 76 MMHG | WEIGHT: 207.1 LBS | OXYGEN SATURATION: 97 %

## 2025-06-27 DIAGNOSIS — N39.3 SUI (STRESS URINARY INCONTINENCE), MALE: ICD-10-CM

## 2025-06-27 DIAGNOSIS — R25.1 TREMORS OF NERVOUS SYSTEM: ICD-10-CM

## 2025-06-27 DIAGNOSIS — Z91.89 AT RISK FOR POLYPHARMACY: ICD-10-CM

## 2025-06-27 DIAGNOSIS — R55 PRE-SYNCOPE: Primary | ICD-10-CM

## 2025-06-27 DIAGNOSIS — I95.9 HYPOTENSION, UNSPECIFIED HYPOTENSION TYPE: ICD-10-CM

## 2025-06-27 DIAGNOSIS — M25.50 POLYARTHRALGIA: ICD-10-CM

## 2025-06-27 DIAGNOSIS — F41.9 ANXIETY: ICD-10-CM

## 2025-06-27 DIAGNOSIS — R91.8 MULTIPLE LUNG NODULES ON CT: Chronic | ICD-10-CM

## 2025-06-27 DIAGNOSIS — R42 LIGHTHEADEDNESS: ICD-10-CM

## 2025-06-27 DIAGNOSIS — F17.218 CIGARETTE NICOTINE DEPENDENCE WITH OTHER NICOTINE-INDUCED DISORDER: ICD-10-CM

## 2025-06-27 DIAGNOSIS — G89.4 CHRONIC PAIN SYNDROME: ICD-10-CM

## 2025-06-27 DIAGNOSIS — Z82.0 FAMILY HISTORY OF PARKINSON'S DISEASE: ICD-10-CM

## 2025-06-27 DIAGNOSIS — M79.10 MYALGIA: ICD-10-CM

## 2025-06-27 DIAGNOSIS — R00.1 BRADYCARDIA: ICD-10-CM

## 2025-06-27 RX ORDER — ACETAMINOPHEN 500 MG
TABLET ORAL
Qty: 120 TABLET | Refills: 0 | Status: SHIPPED | OUTPATIENT
Start: 2025-06-27

## 2025-06-27 RX ORDER — IBUPROFEN 200 MG
TABLET ORAL AS NEEDED
Status: SHIPPED | COMMUNITY
Start: 2025-06-27

## 2025-06-27 RX ORDER — NAPROXEN 500 MG/1
TABLET ORAL
Qty: 60 TABLET | Refills: 0 | Status: SHIPPED | OUTPATIENT
Start: 2025-06-27

## 2025-06-27 NOTE — PATIENT INSTRUCTIONS
Thank you very much for coming.  I am very happy to see you.    I do understand that you have had fatigue, even lightheadedness.  In the meantime, your blood pressure is relatively low, and your heart rate is likewise relatively low.  This does not specifically fit any obvious reason.    In the meantime, we checked your blood pressure and your heart rate lying down and standing up.  Although you have HYPOTENSION or low blood pressure, you do not have ORTHOSTATIC HYPOTENSION, like your father does.    We also did an EKG, and it did not show any obvious abnormalities.  In fact, your EKG is normal.    Still, it is not typical to have low blood pressure and low heart rate.  Usually, with low blood pressure, your body will compensate by increasing your heart rate.    The next best thing to do is to make sure that you stay HYDRATED, especially since you say that your symptoms are seemingly related to the HOT WEATHER.  Make sure that you are drinking lots of fluids throughout the day.    Take things easily, and do not strain yourself.  If you feel weak, please sit down.  Do not take chances.  Do not work on ladders.  Do not work on the roof.  Just stay low to the ground!    Tomorrow, the lab will be open.  Please get your lab work done at any  facility convenient to you.  They will be open from 8 AM to 12 noon.  Please get your laboratory examinations done.  Your orders will be waiting.  Please dial [phone], and get yourself scheduled to have your lab work done.  I will send word regarding results and possible changes.    Until then, please take things easily.  Make sure that you are not exposed to any medications that might change your blood pressure and heart rate.  If you can, avoid Gummies.  No medical marijuana for now.    Yes, I can give you a prescription for TYLENOL EXTRA STRENGTH as well as NAPROXEN.    Please take NAPROXEN 500 mg by mouth every 8-12 hours as needed for pain, up to 2 tablets in 24 hours.   Always with food please.  Watch out for acid indigestion symptoms.  Again, you can take up to 2 tablets in 24 hours.      TYLENOL EXTRA STRENGTH 500 mg, take 2 tablets by mouth every 8 hours as needed for pain, up to 6 tablets in 24 hours.  Tylenol is safe to take with or without food, and can be taken safely with your other medications.    Again, let me call you with results and possible changes.    You will benefit from seeing NEUROLOGY.  In the meantime, the blood examinations will determine if there is anything that might be contributing to your symptoms.    Keep your appointment in AUGUST, but call or text sooner as needed, 911 first of course if urgent.  Take things easily.  Drink lots of fluids throughout the day.  Do not do anything strenuous, and do not spend time under the sun until you feel better.    Please have your laboratory examinations done tomorrow, and I will send word regarding results and possible changes.  Take care and God bless.            0

## 2025-06-27 NOTE — PROGRESS NOTES
Subjective   Patient ID: Bony Galvan is a 50 y.o. male who presents for Follow-up (Patient presented today for a 2 month follow up./).    HPI   Patient anxious, tremulous, coming in with a list of concerns.    He started by saying that he has been dealing with CHRONIC PAIN and was being attended to by a chronic pain specialist who seems to feel that he has already gotten as good as he can get, and seems to be trying to discharge him on muscle relaxant and meloxicam.  He states that he did not like this, because the medications apparently caused him to have URINARY INCONTINENCE, prompting him to stop taking these medications.  He has since been trying to cope with pain with use of TYLENOL EXTR STRENGTH as well as his wife's NAPROXEN prescription.  He states that these medications seem to control most of the pain, especially from the waist up, but from the waist down, he feels that he is suffering from achiness and sluggishness.  Fortunately, no recent falls.  He states that with Tylenol Extra Strength, naproxen, and MEDICAL MARIJUANA GUMMIES, he seems to be controlling his symptoms.    In the meantime, his bigger issue perhaps is the fact that he has been feeling weak and tired and lightheaded recently.  He has found himself leaning against the wall or grabbing furniture.  He states that he feels that his symptoms may have had something to do with the recent HEAT WAVE.  States PALPITATIONS and LIGHTHEADEDNESS with sudden standing, but fortunately, no overt loss of consciousness.  No jorge alberto substernal chest pain, no orthopnea, no paroxysmal nocturnal dyspnea.  No particular dyspnea on exertion.  No bipedal edema.  States that symptoms have not been severe enough for him to seek medical attention, but he feels that he is not doing as good as he should.  He states that he does keep up with fluids.  Indeed, today, noted to have RELATIVE HYPOTENSION, accompanied by BRADYCARDIA, and not reflex tachycardia, as might be  "expected.    The patient is also worried about TREMORS.  Indeed, today, he seems to be a little restless and tremulous.  He is especially concerned, he states, because of a strong family history of PARKINSON'S DISEASE, as recalled by the patient.  Otherwise, no headache, no blurred vision, no diplopia.  No dysphagia.  No focal weakness, no ataxia.  No falls.  No confusion or delirium.  The patient has not been on any opiates lately, and aside from medical marijuana Gummies, has not been on recreational drugs.    It is worthwhile to note that the patient DONATES PLASMA.  No gum or nose bleeding.  No easy bruisability.  No apparent blood in urine or stool.  Likewise, no dyspeptic symptoms, with the patient stating that sometimes, he worries about his history of HIATAL HERNIA.    No cough, no sputum production.  With incontinence, no dysuria.  No malodor.  No particular skin changes.  ENDOCRINE with no polyuria, polydipsia, polyphagia.  No blurred vision.  No skin, hair, nail changes.  No dramatic weight loss or weight gain.  CONSTITUTIONALLY, no fever, no chills.  No night sweats.  No lingering anorexia or nausea.  No apparent lymphadenopathy.  No apparent weight loss.        Review of Systems  Review of systems as in history of present illness, and otherwise, reviewed separately as well, and was unremarkable/negative/noncontributory.        Objective   /76 (BP Location: Right arm, Patient Position: Standing, BP Cuff Size: Adult)   Pulse 72   Ht 1.905 m (6' 3\")   Wt 93.9 kg (207 lb 1.6 oz)   SpO2 97%   BMI 25.89 kg/m²     Physical Exam  Anxious, but in good spirits.  Mildly tremulous perhaps, but remaining receptive, oriented x 3.  Amiable.  Not unkempt.  Moderately built.  Remaining appropriate.  Eager to improve quality of life, and does not wish harm to self or others.    ORTHOSTATIC vital signs noted, NEGATIVE.  HEAD pink palpebral conjunctivae, anicteric sclerae.  Mucous membranes somewhat dry.  NECK " supple, no apparent jugular venous distention.  No carotid bruit.  CARDIOVASCULAR not in distress or diaphoresis.  No bipedal edema.  Regular rate and rhythm.  No murmurs appreciated.  LUNGS not in distress or diaphoresis.  Not using accessory muscles.  Clear to auscultation bilaterally.  ABDOMEN soft, nontender.  BACK no costovertebral angle tenderness.  EXTREMITIES no clubbing, no cyanosis.  NEURO no facial asymmetry.  No apparent cranial nerve deficits.  Romberg negative.  Ambulating without need of assistance.  No apparent focal weakness.  No tremors.  PSYCH receptive, appropriate, and eager to maintain and improve quality of life.        LABORATORY results from April revealing normal blood count, normal liver and kidney function.  Hemoglobin A1c 5.6, LDL cholesterol 88, vitamin D 29, taking vitamin D daily.  Urinalysis negative.  No findings that might explain symptoms, as above.        Assessment/Plan   Diagnoses and all orders for this visit:  Pre-syncope  -     CBC and Auto Differential; Future  -     Comprehensive Metabolic Panel; Future  -     TSH with reflex to Free T4 if abnormal; Future  -     Magnesium; Future  -     Drug Screen, Urine With Reflex to Confirmation; Future  -     ECG 12 Lead  Lightheadedness  -     CBC and Auto Differential; Future  -     Comprehensive Metabolic Panel; Future  -     TSH with reflex to Free T4 if abnormal; Future  -     Magnesium; Future  -     Drug Screen, Urine With Reflex to Confirmation; Future  -     ECG 12 Lead  Bradycardia  -     CBC and Auto Differential; Future  -     Comprehensive Metabolic Panel; Future  -     TSH with reflex to Free T4 if abnormal; Future  -     Magnesium; Future  -     Drug Screen, Urine With Reflex to Confirmation; Future  -     ECG 12 Lead  Hypotension, unspecified hypotension type  -     CBC and Auto Differential; Future  -     Comprehensive Metabolic Panel; Future  -     TSH with reflex to Free T4 if abnormal; Future  -     Magnesium;  Future  -     Urinalysis with Reflex Culture and Microscopic; Future  -     Drug Screen, Urine With Reflex to Confirmation; Future  -     ECG 12 Lead  At risk for polypharmacy  -     Drug Screen, Urine With Reflex to Confirmation; Future  Polyarthralgia  -     Creatine Kinase; Future  -     Uric Acid; Future  -     acetaminophen (Tylenol Extra Strength) 500 mg tablet; Please take 2 tablets by mouth every 8 hours as needed for pain, up to 6 tablets in 24 hours.  -     naproxen (Naprosyn) 500 mg tablet; Please take 1 tablet by mouth every 8-12 hours as needed for pain, up to 2 tablets in 24 hours.  Always with food please.  Again, limit 2 tablets in 24 hours.  Thank you.  Myalgia  -     Creatine Kinase; Future  -     Uric Acid; Future  -     acetaminophen (Tylenol Extra Strength) 500 mg tablet; Please take 2 tablets by mouth every 8 hours as needed for pain, up to 6 tablets in 24 hours.  -     naproxen (Naprosyn) 500 mg tablet; Please take 1 tablet by mouth every 8-12 hours as needed for pain, up to 2 tablets in 24 hours.  Always with food please.  Again, limit 2 tablets in 24 hours.  Thank you.  Chronic pain syndrome  -     Creatine Kinase; Future  -     Uric Acid; Future  -     Drug Screen, Urine With Reflex to Confirmation; Future  -     acetaminophen (Tylenol Extra Strength) 500 mg tablet; Please take 2 tablets by mouth every 8 hours as needed for pain, up to 6 tablets in 24 hours.  -     naproxen (Naprosyn) 500 mg tablet; Please take 1 tablet by mouth every 8-12 hours as needed for pain, up to 2 tablets in 24 hours.  Always with food please.  Again, limit 2 tablets in 24 hours.  Thank you.  Tremors of nervous system  -     TSH with reflex to Free T4 if abnormal; Future  -     Referral to Neurology; Future  Family history of Parkinson's disease  -     Referral to Neurology; Future  Anxiety  Cigarette nicotine dependence with other nicotine-induced disorder  -     ECG 12 Lead  Multiple lung nodules on  CT  Comments:  RIGHT, 05/2025 CT repeat in 1 year  KATHERINE (stress urinary incontinence), male  -     Urinalysis with Reflex Culture and Microscopic; Future       Thank you very much for coming.  I am very happy to see you.    I do understand that you have had fatigue, even lightheadedness.  In the meantime, your blood pressure is relatively low, and your heart rate is likewise relatively low.  This does not specifically fit any obvious reason.    In the meantime, we checked your blood pressure and your heart rate lying down and standing up.  Although you have HYPOTENSION or low blood pressure, you do not have ORTHOSTATIC HYPOTENSION, like your father does.    We also did an EKG, and it did not show any obvious abnormalities.  In fact, your EKG is normal.    Still, it is not typical to have low blood pressure and low heart rate.  Usually, with low blood pressure, your body will compensate by increasing your heart rate.    The next best thing to do is to make sure that you stay HYDRATED, especially since you say that your symptoms are seemingly related to the HOT WEATHER.  Make sure that you are drinking lots of fluids throughout the day.    Take things easily, and do not strain yourself.  If you feel weak, please sit down.  Do not take chances.  Do not work on ladders.  Do not work on the roof.  Just stay low to the ground!    Tomorrow, the lab will be open.  Please get your lab work done at any  facility convenient to you.  They will be open from 8 AM to 12 noon.  Please get your laboratory examinations done.  Your orders will be waiting.  Please dial [phone], and get yourself scheduled to have your lab work done.  I will send word regarding results and possible changes.    Until then, please take things easily.  Make sure that you are not exposed to any medications that might change your blood pressure and heart rate.  If you can, avoid Gummies.  No medical marijuana for now.    Yes, I can give you a  prescription for TYLENOL EXTRA STRENGTH as well as NAPROXEN.    Please take NAPROXEN 500 mg by mouth every 8-12 hours as needed for pain, up to 2 tablets in 24 hours.  Always with food please.  Watch out for acid indigestion symptoms.  Again, you can take up to 2 tablets in 24 hours.      TYLENOL EXTRA STRENGTH 500 mg, take 2 tablets by mouth every 8 hours as needed for pain, up to 6 tablets in 24 hours.  Tylenol is safe to take with or without food, and can be taken safely with your other medications.    Again, let me call you with results and possible changes.    You will benefit from seeing NEUROLOGY.  In the meantime, the blood examinations will determine if there is anything that might be contributing to your symptoms.    Keep your appointment in AUGUST, but call or text sooner as needed, 911 first of course if urgent.  Take things easily.  Drink lots of fluids throughout the day.  Do not do anything strenuous, and do not spend time under the sun until you feel better.    Please have your laboratory examinations done tomorrow, and I will send word regarding results and possible changes.  Take care and God bless.            0

## 2025-06-29 LAB
ALBUMIN SERPL-MCNC: 4.1 G/DL (ref 3.6–5.1)
ALP SERPL-CCNC: 42 U/L (ref 35–144)
ALT SERPL-CCNC: 13 U/L (ref 9–46)
ANION GAP SERPL CALCULATED.4IONS-SCNC: 10 MMOL/L (CALC) (ref 7–17)
AST SERPL-CCNC: 16 U/L (ref 10–35)
BASOPHILS # BLD AUTO: 82 CELLS/UL (ref 0–200)
BASOPHILS NFR BLD AUTO: 1.3 %
BILIRUB SERPL-MCNC: 0.5 MG/DL (ref 0.2–1.2)
BUN SERPL-MCNC: 18 MG/DL (ref 7–25)
CALCIUM SERPL-MCNC: 9.1 MG/DL (ref 8.6–10.3)
CHLORIDE SERPL-SCNC: 106 MMOL/L (ref 98–110)
CK SERPL-CCNC: 46 U/L (ref 23–325)
CO2 SERPL-SCNC: 28 MMOL/L (ref 20–32)
CREAT SERPL-MCNC: 0.94 MG/DL (ref 0.7–1.3)
EGFRCR SERPLBLD CKD-EPI 2021: 98 ML/MIN/1.73M2
EOSINOPHIL # BLD AUTO: 88 CELLS/UL (ref 15–500)
EOSINOPHIL NFR BLD AUTO: 1.4 %
ERYTHROCYTE [DISTWIDTH] IN BLOOD BY AUTOMATED COUNT: 13 % (ref 11–15)
GLUCOSE SERPL-MCNC: 65 MG/DL (ref 65–99)
HCT VFR BLD AUTO: 46.3 % (ref 38.5–50)
HGB BLD-MCNC: 14.9 G/DL (ref 13.2–17.1)
LYMPHOCYTES # BLD AUTO: 1342 CELLS/UL (ref 850–3900)
LYMPHOCYTES NFR BLD AUTO: 21.3 %
MAGNESIUM SERPL-MCNC: 2.3 MG/DL (ref 1.5–2.5)
MCH RBC QN AUTO: 30.8 PG (ref 27–33)
MCHC RBC AUTO-ENTMCNC: 32.2 G/DL (ref 32–36)
MCV RBC AUTO: 95.9 FL (ref 80–100)
MONOCYTES # BLD AUTO: 290 CELLS/UL (ref 200–950)
MONOCYTES NFR BLD AUTO: 4.6 %
NEUTROPHILS # BLD AUTO: 4498 CELLS/UL (ref 1500–7800)
NEUTROPHILS NFR BLD AUTO: 71.4 %
PLATELET # BLD AUTO: 244 THOUSAND/UL (ref 140–400)
PMV BLD REES-ECKER: 10 FL (ref 7.5–12.5)
POTASSIUM SERPL-SCNC: 4.5 MMOL/L (ref 3.5–5.3)
PROT SERPL-MCNC: 5.7 G/DL (ref 6.1–8.1)
RBC # BLD AUTO: 4.83 MILLION/UL (ref 4.2–5.8)
SODIUM SERPL-SCNC: 144 MMOL/L (ref 135–146)
TSH SERPL-ACNC: 1.3 MIU/L (ref 0.4–4.5)
URATE SERPL-MCNC: 4.1 MG/DL (ref 4–8)
WBC # BLD AUTO: 6.3 THOUSAND/UL (ref 3.8–10.8)

## 2025-07-15 DIAGNOSIS — G89.4 CHRONIC PAIN SYNDROME: ICD-10-CM

## 2025-07-15 DIAGNOSIS — R09.82 POSTNASAL DRIP: ICD-10-CM

## 2025-07-15 DIAGNOSIS — M25.50 POLYARTHRALGIA: ICD-10-CM

## 2025-07-15 DIAGNOSIS — M79.10 MYALGIA: ICD-10-CM

## 2025-07-15 DIAGNOSIS — J30.89 NON-SEASONAL ALLERGIC RHINITIS, UNSPECIFIED TRIGGER: ICD-10-CM

## 2025-07-15 RX ORDER — NAPROXEN 500 MG/1
TABLET ORAL
Qty: 60 TABLET | Refills: 0 | Status: SHIPPED | OUTPATIENT
Start: 2025-07-15

## 2025-07-15 RX ORDER — FLUTICASONE PROPIONATE 50 MCG
SPRAY, SUSPENSION (ML) NASAL
Qty: 16 G | Refills: 5 | Status: SHIPPED | OUTPATIENT
Start: 2025-07-15

## 2025-07-23 ENCOUNTER — APPOINTMENT (OUTPATIENT)
Dept: ORTHOPEDIC SURGERY | Facility: CLINIC | Age: 50
End: 2025-07-23
Payer: MEDICAID

## 2025-08-06 ENCOUNTER — DOCUMENTATION (OUTPATIENT)
Dept: GASTROENTEROLOGY | Facility: CLINIC | Age: 50
End: 2025-08-06

## 2025-08-06 ENCOUNTER — APPOINTMENT (OUTPATIENT)
Dept: GASTROENTEROLOGY | Facility: CLINIC | Age: 50
End: 2025-08-06
Payer: COMMERCIAL

## 2025-08-06 DIAGNOSIS — G89.4 CHRONIC PAIN SYNDROME: ICD-10-CM

## 2025-08-06 DIAGNOSIS — M25.50 POLYARTHRALGIA: ICD-10-CM

## 2025-08-06 DIAGNOSIS — M79.10 MYALGIA: ICD-10-CM

## 2025-08-06 RX ORDER — ACETAMINOPHEN 500 MG
TABLET ORAL
Qty: 120 TABLET | Refills: 1 | Status: SHIPPED | OUTPATIENT
Start: 2025-08-06

## 2025-08-12 DIAGNOSIS — G89.4 CHRONIC PAIN SYNDROME: ICD-10-CM

## 2025-08-12 DIAGNOSIS — M25.50 POLYARTHRALGIA: ICD-10-CM

## 2025-08-12 DIAGNOSIS — M79.10 MYALGIA: ICD-10-CM

## 2025-08-12 RX ORDER — NAPROXEN 500 MG/1
TABLET ORAL
Qty: 60 TABLET | Refills: 0 | Status: SHIPPED | OUTPATIENT
Start: 2025-08-12

## 2025-08-13 ENCOUNTER — APPOINTMENT (OUTPATIENT)
Dept: ORTHOPEDIC SURGERY | Facility: CLINIC | Age: 50
End: 2025-08-13
Payer: MEDICAID

## 2025-08-13 ENCOUNTER — OFFICE VISIT (OUTPATIENT)
Dept: ORTHOPEDIC SURGERY | Facility: CLINIC | Age: 50
End: 2025-08-13
Payer: MEDICAID

## 2025-08-13 ENCOUNTER — HOSPITAL ENCOUNTER (OUTPATIENT)
Dept: RADIOLOGY | Facility: CLINIC | Age: 50
Discharge: HOME | End: 2025-08-13
Payer: MEDICAID

## 2025-08-13 DIAGNOSIS — S32.421D CLOSED DISPLACED FRACTURE OF POSTERIOR WALL OF RIGHT ACETABULUM WITH ROUTINE HEALING, SUBSEQUENT ENCOUNTER: ICD-10-CM

## 2025-08-13 DIAGNOSIS — S32.421D CLOSED DISPLACED FRACTURE OF POSTERIOR WALL OF RIGHT ACETABULUM WITH ROUTINE HEALING, SUBSEQUENT ENCOUNTER: Primary | ICD-10-CM

## 2025-08-13 PROCEDURE — 99214 OFFICE O/P EST MOD 30 MIN: CPT | Performed by: ORTHOPAEDIC SURGERY

## 2025-08-13 PROCEDURE — 72190 X-RAY EXAM OF PELVIS: CPT

## 2025-08-27 ENCOUNTER — APPOINTMENT (OUTPATIENT)
Dept: PRIMARY CARE | Facility: CLINIC | Age: 50
End: 2025-08-27
Payer: MEDICAID

## 2025-08-27 ENCOUNTER — APPOINTMENT (OUTPATIENT)
Dept: PRIMARY CARE | Facility: CLINIC | Age: 50
End: 2025-08-27
Payer: COMMERCIAL

## 2025-08-28 ENCOUNTER — TELEMEDICINE (OUTPATIENT)
Dept: UROLOGY | Facility: HOSPITAL | Age: 50
End: 2025-08-28
Payer: MEDICAID

## 2025-08-28 DIAGNOSIS — S37.30XD INJURY OF URETHRA, SUBSEQUENT ENCOUNTER: Primary | ICD-10-CM

## 2025-08-28 DIAGNOSIS — N52.9 ERECTILE DYSFUNCTION, UNSPECIFIED ERECTILE DYSFUNCTION TYPE: ICD-10-CM

## 2025-08-28 PROCEDURE — 99214 OFFICE O/P EST MOD 30 MIN: CPT | Performed by: UROLOGY

## 2025-08-28 RX ORDER — SILDENAFIL 100 MG/1
100 TABLET, FILM COATED ORAL AS NEEDED
Qty: 30 TABLET | Refills: 5 | Status: SHIPPED | OUTPATIENT
Start: 2025-08-28

## 2025-08-29 ENCOUNTER — APPOINTMENT (OUTPATIENT)
Dept: UROLOGY | Facility: HOSPITAL | Age: 50
End: 2025-08-29
Payer: MEDICAID

## 2025-09-12 ENCOUNTER — APPOINTMENT (OUTPATIENT)
Dept: PRIMARY CARE | Facility: CLINIC | Age: 50
End: 2025-09-12
Payer: MEDICAID

## 2025-09-16 ENCOUNTER — APPOINTMENT (OUTPATIENT)
Dept: ORTHOPEDIC SURGERY | Facility: HOSPITAL | Age: 50
End: 2025-09-16
Payer: MEDICAID

## 2025-12-17 ENCOUNTER — APPOINTMENT (OUTPATIENT)
Dept: GASTROENTEROLOGY | Facility: CLINIC | Age: 50
End: 2025-12-17
Payer: MEDICAID

## 2026-01-21 ENCOUNTER — APPOINTMENT (OUTPATIENT)
Dept: NEUROLOGY | Facility: CLINIC | Age: 51
End: 2026-01-21
Payer: MEDICAID

## (undated) DEVICE — ELECTROSURGICAL PENCIL BUTTON SWITCH E-Z CLEAN COATED BLADE ELECTRODE 10 FT (3 M) CORD HOLSTER: Brand: MEGADYNE

## (undated) DEVICE — KIT,ANTI FOG,W/SPONGE & FLUID,SOFT PACK: Brand: MEDLINE

## (undated) DEVICE — INTENDED FOR TISSUE SEPARATION, AND OTHER PROCEDURES THAT REQUIRE A SHARP SURGICAL BLADE TO PUNCTURE OR CUT.: Brand: BARD-PARKER ® CARBON RIB-BACK BLADES

## (undated) DEVICE — BLADE 1882040HR 5PK M4 INF TURB 2MM ROT: Brand: STRAIGHTSHOT

## (undated) DEVICE — GLOVE ORANGE PI 7 1/2   MSG9075

## (undated) DEVICE — SPLINT 1524050 5PK PAIR DOYLE II AIRWAY: Brand: DOYLE II ™

## (undated) DEVICE — PAD N ADH W3XL4IN POLY COT SFT PERF FLM EASILY CUT ABSRB

## (undated) DEVICE — LABEL MED MINI W/ MARKER

## (undated) DEVICE — ENTACT SEPTAL STAPLER 3 PACK: Brand: ENT SINUS

## (undated) DEVICE — COUNTER NDL 40 COUNT HLD 70 FOAM BLK ADH W/ MAG

## (undated) DEVICE — TUBING, SUCTION, 1/4" X 10', STRAIGHT: Brand: MEDLINE

## (undated) DEVICE — SUTURE PERMAHAND SZ 2-0 L30IN NONABSORBABLE BLK L60MM KS 623H

## (undated) DEVICE — SYRINGE MED 10ML TRNSLUC BRL PLUNG BLK MRK POLYPR CTRL

## (undated) DEVICE — GAUZE,SPONGE,4"X4",16PLY,XRAY,STRL,LF: Brand: MEDLINE

## (undated) DEVICE — SUTURE CHROMIC GUT SZ 4-0 L18IN ABSRB BRN L13MM P-3 3/8 CIR 1654G

## (undated) DEVICE — ELECTRODE NDL L6.5IN S STL VERSATILE REUSE

## (undated) DEVICE — GAUZE,SPONGE,2"X2",8PLY,STERILE,LF,2'S: Brand: MEDLINE

## (undated) DEVICE — ELECTRODE PT RET AD L9FT HI MOIST COND ADH HYDRGEL CORDED

## (undated) DEVICE — HYPODERMIC SAFETY NEEDLE: Brand: MAGELLAN

## (undated) DEVICE — SPONGE GZ W4XL4IN COT 12 PLY TYP VII WVN C FLD DSGN

## (undated) DEVICE — CODMAN® SURGICAL PATTIES 1/2" X 3" (1.27CM X 7.62CM): Brand: CODMAN®

## (undated) DEVICE — DBD-PACK,EENT,SIRUS,PK II: Brand: MEDLINE